# Patient Record
Sex: FEMALE | Race: WHITE | NOT HISPANIC OR LATINO
[De-identification: names, ages, dates, MRNs, and addresses within clinical notes are randomized per-mention and may not be internally consistent; named-entity substitution may affect disease eponyms.]

---

## 2017-04-06 ENCOUNTER — MEDICATION RENEWAL (OUTPATIENT)
Age: 37
End: 2017-04-06

## 2017-05-04 ENCOUNTER — MEDICATION RENEWAL (OUTPATIENT)
Age: 37
End: 2017-05-04

## 2017-12-22 ENCOUNTER — OUTPATIENT (OUTPATIENT)
Dept: OUTPATIENT SERVICES | Facility: HOSPITAL | Age: 37
LOS: 1 days | End: 2017-12-22
Payer: COMMERCIAL

## 2017-12-22 DIAGNOSIS — R00.2 PALPITATIONS: ICD-10-CM

## 2017-12-22 PROCEDURE — 93306 TTE W/DOPPLER COMPLETE: CPT | Mod: 26

## 2017-12-22 PROCEDURE — 93306 TTE W/DOPPLER COMPLETE: CPT

## 2018-01-05 ENCOUNTER — APPOINTMENT (OUTPATIENT)
Dept: OBGYN | Facility: CLINIC | Age: 38
End: 2018-01-05

## 2018-01-09 ENCOUNTER — RX RENEWAL (OUTPATIENT)
Age: 38
End: 2018-01-09

## 2018-01-19 ENCOUNTER — APPOINTMENT (OUTPATIENT)
Dept: OBGYN | Facility: CLINIC | Age: 38
End: 2018-01-19

## 2018-01-26 ENCOUNTER — NON-APPOINTMENT (OUTPATIENT)
Age: 38
End: 2018-01-26

## 2018-01-26 ENCOUNTER — APPOINTMENT (OUTPATIENT)
Dept: CARDIOLOGY | Facility: CLINIC | Age: 38
End: 2018-01-26
Payer: COMMERCIAL

## 2018-01-26 VITALS
OXYGEN SATURATION: 100 % | HEART RATE: 73 BPM | WEIGHT: 155 LBS | DIASTOLIC BLOOD PRESSURE: 68 MMHG | SYSTOLIC BLOOD PRESSURE: 101 MMHG | BODY MASS INDEX: 24.33 KG/M2 | HEIGHT: 67 IN

## 2018-01-26 PROCEDURE — 99215 OFFICE O/P EST HI 40 MIN: CPT

## 2018-01-26 PROCEDURE — 93000 ELECTROCARDIOGRAM COMPLETE: CPT

## 2018-02-03 PROCEDURE — 93272 ECG/REVIEW INTERPRET ONLY: CPT

## 2018-02-09 ENCOUNTER — APPOINTMENT (OUTPATIENT)
Dept: CARDIOTHORACIC SURGERY | Facility: CLINIC | Age: 38
End: 2018-02-09
Payer: COMMERCIAL

## 2018-02-09 VITALS
TEMPERATURE: 98 F | RESPIRATION RATE: 13 BRPM | BODY MASS INDEX: 24.33 KG/M2 | OXYGEN SATURATION: 100 % | HEART RATE: 86 BPM | SYSTOLIC BLOOD PRESSURE: 109 MMHG | WEIGHT: 155 LBS | HEIGHT: 67 IN | DIASTOLIC BLOOD PRESSURE: 68 MMHG

## 2018-02-09 PROCEDURE — 99245 OFF/OP CONSLTJ NEW/EST HI 55: CPT

## 2018-02-19 ENCOUNTER — FORM ENCOUNTER (OUTPATIENT)
Age: 38
End: 2018-02-19

## 2018-03-16 ENCOUNTER — OUTPATIENT (OUTPATIENT)
Dept: OUTPATIENT SERVICES | Facility: HOSPITAL | Age: 38
LOS: 1 days | End: 2018-03-16
Payer: COMMERCIAL

## 2018-03-16 ENCOUNTER — APPOINTMENT (OUTPATIENT)
Dept: CT IMAGING | Facility: IMAGING CENTER | Age: 38
End: 2018-03-16
Payer: COMMERCIAL

## 2018-03-16 DIAGNOSIS — Z79.01 LONG TERM (CURRENT) USE OF ANTICOAGULANTS: ICD-10-CM

## 2018-03-16 PROCEDURE — 71270 CT THORAX DX C-/C+: CPT

## 2018-03-16 PROCEDURE — 71270 CT THORAX DX C-/C+: CPT | Mod: 26

## 2018-03-18 ENCOUNTER — TRANSCRIPTION ENCOUNTER (OUTPATIENT)
Age: 38
End: 2018-03-18

## 2018-05-15 ENCOUNTER — NON-APPOINTMENT (OUTPATIENT)
Age: 38
End: 2018-05-15

## 2018-05-15 ENCOUNTER — APPOINTMENT (OUTPATIENT)
Dept: CARDIOLOGY | Facility: CLINIC | Age: 38
End: 2018-05-15
Payer: COMMERCIAL

## 2018-05-15 VITALS
HEIGHT: 67 IN | DIASTOLIC BLOOD PRESSURE: 68 MMHG | HEART RATE: 88 BPM | SYSTOLIC BLOOD PRESSURE: 112 MMHG | WEIGHT: 150 LBS | OXYGEN SATURATION: 100 % | BODY MASS INDEX: 23.54 KG/M2

## 2018-05-15 DIAGNOSIS — R53.83 OTHER FATIGUE: ICD-10-CM

## 2018-05-15 LAB
BASOPHILS # BLD AUTO: 0.04 K/UL
BASOPHILS NFR BLD AUTO: 0.6 %
EOSINOPHIL # BLD AUTO: 0.78 K/UL
EOSINOPHIL NFR BLD AUTO: 10.9 %
HCT VFR BLD CALC: 35.7 %
HGB BLD-MCNC: 10.5 G/DL
IMM GRANULOCYTES NFR BLD AUTO: 0.3 %
INR PPP: 3.61 RATIO
LYMPHOCYTES # BLD AUTO: 1.32 K/UL
LYMPHOCYTES NFR BLD AUTO: 18.5 %
MAN DIFF?: NORMAL
MCHC RBC-ENTMCNC: 25.7 PG
MCHC RBC-ENTMCNC: 29.4 GM/DL
MCV RBC AUTO: 87.5 FL
MONOCYTES # BLD AUTO: 0.54 K/UL
MONOCYTES NFR BLD AUTO: 7.6 %
NEUTROPHILS # BLD AUTO: 4.45 K/UL
NEUTROPHILS NFR BLD AUTO: 62.1 %
PLATELET # BLD AUTO: 267 K/UL
PT BLD: 41.9 SEC
RBC # BLD: 4.08 M/UL
RBC # FLD: 16.6 %
WBC # FLD AUTO: 7.15 K/UL

## 2018-05-15 PROCEDURE — 93000 ELECTROCARDIOGRAM COMPLETE: CPT

## 2018-05-15 PROCEDURE — 99215 OFFICE O/P EST HI 40 MIN: CPT

## 2018-05-16 LAB
ANION GAP SERPL CALC-SCNC: 13 MMOL/L
BUN SERPL-MCNC: 8 MG/DL
CALCIUM SERPL-MCNC: 9.9 MG/DL
CHLORIDE SERPL-SCNC: 100 MMOL/L
CO2 SERPL-SCNC: 25 MMOL/L
CREAT SERPL-MCNC: 0.55 MG/DL
GLUCOSE SERPL-MCNC: 75 MG/DL
NT-PROBNP SERPL-MCNC: 186 PG/ML
POTASSIUM SERPL-SCNC: 4.6 MMOL/L
SODIUM SERPL-SCNC: 138 MMOL/L

## 2018-08-17 ENCOUNTER — APPOINTMENT (OUTPATIENT)
Dept: OBGYN | Facility: CLINIC | Age: 38
End: 2018-08-17

## 2018-08-23 ENCOUNTER — NON-APPOINTMENT (OUTPATIENT)
Age: 38
End: 2018-08-23

## 2018-08-23 ENCOUNTER — APPOINTMENT (OUTPATIENT)
Dept: CARDIOLOGY | Facility: CLINIC | Age: 38
End: 2018-08-23
Payer: COMMERCIAL

## 2018-08-23 VITALS
BODY MASS INDEX: 23.65 KG/M2 | DIASTOLIC BLOOD PRESSURE: 66 MMHG | SYSTOLIC BLOOD PRESSURE: 101 MMHG | HEART RATE: 85 BPM | OXYGEN SATURATION: 100 % | WEIGHT: 151 LBS

## 2018-08-23 PROCEDURE — 99215 OFFICE O/P EST HI 40 MIN: CPT

## 2018-08-23 RX ORDER — POTASSIUM CITRATE 10 MEQ/1
10 MEQ TABLET, EXTENDED RELEASE ORAL
Qty: 15 | Refills: 0 | Status: DISCONTINUED | COMMUNITY
Start: 2018-04-24 | End: 2018-08-23

## 2018-08-23 RX ORDER — AMIODARONE HYDROCHLORIDE 200 MG/1
200 TABLET ORAL
Qty: 30 | Refills: 0 | Status: DISCONTINUED | COMMUNITY
Start: 2018-04-24 | End: 2018-08-23

## 2018-08-23 RX ORDER — METOPROLOL TARTRATE 25 MG/1
25 TABLET, FILM COATED ORAL TWICE DAILY
Qty: 180 | Refills: 1 | Status: DISCONTINUED | COMMUNITY
Start: 2018-04-24 | End: 2018-08-23

## 2018-08-23 RX ORDER — FOLIC ACID 1 MG/1
1 TABLET ORAL
Qty: 30 | Refills: 0 | Status: DISCONTINUED | COMMUNITY
Start: 2018-04-24 | End: 2018-08-23

## 2018-08-23 RX ORDER — FUROSEMIDE 20 MG/1
20 TABLET ORAL
Qty: 15 | Refills: 0 | Status: DISCONTINUED | COMMUNITY
Start: 2018-04-24 | End: 2018-08-23

## 2018-10-12 ENCOUNTER — APPOINTMENT (OUTPATIENT)
Dept: OBGYN | Facility: CLINIC | Age: 38
End: 2018-10-12
Payer: COMMERCIAL

## 2018-10-12 ENCOUNTER — RESULT REVIEW (OUTPATIENT)
Age: 38
End: 2018-10-12

## 2018-10-12 PROCEDURE — 99385 PREV VISIT NEW AGE 18-39: CPT

## 2018-10-15 ENCOUNTER — MEDICATION RENEWAL (OUTPATIENT)
Age: 38
End: 2018-10-15

## 2018-10-19 ENCOUNTER — OUTPATIENT (OUTPATIENT)
Dept: OUTPATIENT SERVICES | Facility: HOSPITAL | Age: 38
LOS: 1 days | End: 2018-10-19
Payer: COMMERCIAL

## 2018-10-19 ENCOUNTER — APPOINTMENT (OUTPATIENT)
Dept: ULTRASOUND IMAGING | Facility: CLINIC | Age: 38
End: 2018-10-19
Payer: COMMERCIAL

## 2018-10-19 DIAGNOSIS — Z00.8 ENCOUNTER FOR OTHER GENERAL EXAMINATION: ICD-10-CM

## 2018-10-19 PROCEDURE — 76856 US EXAM PELVIC COMPLETE: CPT | Mod: 26

## 2018-10-19 PROCEDURE — 76830 TRANSVAGINAL US NON-OB: CPT | Mod: 26

## 2018-10-19 PROCEDURE — 76856 US EXAM PELVIC COMPLETE: CPT

## 2018-10-19 PROCEDURE — 76830 TRANSVAGINAL US NON-OB: CPT

## 2018-11-02 ENCOUNTER — APPOINTMENT (OUTPATIENT)
Dept: CV DIAGNOSITCS | Facility: HOSPITAL | Age: 38
End: 2018-11-02

## 2018-11-02 ENCOUNTER — OUTPATIENT (OUTPATIENT)
Dept: OUTPATIENT SERVICES | Facility: HOSPITAL | Age: 38
LOS: 1 days | End: 2018-11-02
Payer: COMMERCIAL

## 2018-11-02 DIAGNOSIS — I35.1 NONRHEUMATIC AORTIC (VALVE) INSUFFICIENCY: ICD-10-CM

## 2018-11-02 PROCEDURE — 93306 TTE W/DOPPLER COMPLETE: CPT

## 2018-11-02 PROCEDURE — 93306 TTE W/DOPPLER COMPLETE: CPT | Mod: 26

## 2018-12-28 ENCOUNTER — OUTPATIENT (OUTPATIENT)
Dept: OUTPATIENT SERVICES | Facility: HOSPITAL | Age: 38
LOS: 1 days | End: 2018-12-28
Payer: COMMERCIAL

## 2018-12-28 ENCOUNTER — APPOINTMENT (OUTPATIENT)
Dept: ULTRASOUND IMAGING | Facility: CLINIC | Age: 38
End: 2018-12-28
Payer: COMMERCIAL

## 2018-12-28 DIAGNOSIS — Z00.8 ENCOUNTER FOR OTHER GENERAL EXAMINATION: ICD-10-CM

## 2018-12-28 PROCEDURE — 76830 TRANSVAGINAL US NON-OB: CPT

## 2018-12-28 PROCEDURE — 76830 TRANSVAGINAL US NON-OB: CPT | Mod: 26

## 2018-12-28 PROCEDURE — 76856 US EXAM PELVIC COMPLETE: CPT

## 2018-12-28 PROCEDURE — 76856 US EXAM PELVIC COMPLETE: CPT | Mod: 26

## 2019-02-21 ENCOUNTER — APPOINTMENT (OUTPATIENT)
Dept: CARDIOLOGY | Facility: CLINIC | Age: 39
End: 2019-02-21
Payer: COMMERCIAL

## 2019-02-21 ENCOUNTER — NON-APPOINTMENT (OUTPATIENT)
Age: 39
End: 2019-02-21

## 2019-02-21 VITALS
OXYGEN SATURATION: 99 % | DIASTOLIC BLOOD PRESSURE: 67 MMHG | SYSTOLIC BLOOD PRESSURE: 109 MMHG | BODY MASS INDEX: 23.54 KG/M2 | HEIGHT: 67 IN | HEART RATE: 81 BPM | WEIGHT: 150 LBS

## 2019-02-21 PROCEDURE — 99215 OFFICE O/P EST HI 40 MIN: CPT

## 2019-02-21 PROCEDURE — 93000 ELECTROCARDIOGRAM COMPLETE: CPT

## 2019-02-21 NOTE — HISTORY OF PRESENT ILLNESS
[FreeTextEntry1] : Zoe is feeling well. She can climb the subway stairs quickly like never before. She wants to go on a diet that has a lot of Vit K because of vegetables. Also thinking of pregnancy again.

## 2019-02-21 NOTE — PHYSICAL EXAM
[General Appearance - Well Developed] : well developed [Normal Appearance] : normal appearance [Well Groomed] : well groomed [General Appearance - Well Nourished] : well nourished [No Deformities] : no deformities [General Appearance - In No Acute Distress] : no acute distress [Normal Conjunctiva] : the conjunctiva exhibited no abnormalities [Eyelids - No Xanthelasma] : the eyelids demonstrated no xanthelasmas [Normal Oral Mucosa] : normal oral mucosa [No Oral Pallor] : no oral pallor [No Oral Cyanosis] : no oral cyanosis [Normal Jugular Venous A Waves Present] : normal jugular venous A waves present [Normal Jugular Venous V Waves Present] : normal jugular venous V waves present [No Jugular Venous Frias A Waves] : no jugular venous frias A waves [Respiration, Rhythm And Depth] : normal respiratory rhythm and effort [Exaggerated Use Of Accessory Muscles For Inspiration] : no accessory muscle use [Auscultation Breath Sounds / Voice Sounds] : lungs were clear to auscultation bilaterally [Heart Rate And Rhythm] : heart rate and rhythm were normal [Murmurs] : no murmurs present [Abdomen Soft] : soft [Abdomen Tenderness] : non-tender [Abdomen Mass (___ Cm)] : no abdominal mass palpated [Abnormal Walk] : normal gait [Gait - Sufficient For Exercise Testing] : the gait was sufficient for exercise testing [Nail Clubbing] : no clubbing of the fingernails [Cyanosis, Localized] : no localized cyanosis [Petechial Hemorrhages (___cm)] : no petechial hemorrhages [Skin Color & Pigmentation] : normal skin color and pigmentation [] : no rash [No Venous Stasis] : no venous stasis [Skin Lesions] : no skin lesions [No Skin Ulcers] : no skin ulcer [No Xanthoma] : no  xanthoma was observed [Oriented To Time, Place, And Person] : oriented to person, place, and time [Affect] : the affect was normal [Mood] : the mood was normal [No Anxiety] : not feeling anxious [FreeTextEntry1] : Prosthetic S1 and S2

## 2019-02-21 NOTE — DISCUSSION/SUMMARY
[___ Month(s)] : [unfilled] month(s) [FreeTextEntry1] : The patient is a 38-year-old female history of rheumatic heart disease, mechanical mitral and aortic valve replacement s/p reop who is doing well. Crisp prosthetic heart sounds on exam. No evidence arrhythmia off medication. She understands she is at risk for recurrent afib and may take prn metoprolol should that occur and notify me at the time. Continue [facility] home monitoring weekly for INR. Once starts the diet should check INR more often.  She has been therapeutic with no bleeding episodes. 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 High risk for pregnancy. ECHO at one year in April.

## 2019-05-31 ENCOUNTER — APPOINTMENT (OUTPATIENT)
Dept: ULTRASOUND IMAGING | Facility: CLINIC | Age: 39
End: 2019-05-31
Payer: COMMERCIAL

## 2019-05-31 ENCOUNTER — OUTPATIENT (OUTPATIENT)
Dept: OUTPATIENT SERVICES | Facility: HOSPITAL | Age: 39
LOS: 1 days | End: 2019-05-31
Payer: COMMERCIAL

## 2019-05-31 DIAGNOSIS — Z00.8 ENCOUNTER FOR OTHER GENERAL EXAMINATION: ICD-10-CM

## 2019-05-31 PROCEDURE — 76830 TRANSVAGINAL US NON-OB: CPT | Mod: 26

## 2019-05-31 PROCEDURE — 76856 US EXAM PELVIC COMPLETE: CPT | Mod: 26

## 2019-05-31 PROCEDURE — 76856 US EXAM PELVIC COMPLETE: CPT

## 2019-05-31 PROCEDURE — 76830 TRANSVAGINAL US NON-OB: CPT

## 2019-08-16 ENCOUNTER — OUTPATIENT (OUTPATIENT)
Dept: OUTPATIENT SERVICES | Facility: HOSPITAL | Age: 39
LOS: 1 days | End: 2019-08-16
Payer: COMMERCIAL

## 2019-08-16 ENCOUNTER — APPOINTMENT (OUTPATIENT)
Dept: CV DIAGNOSITCS | Facility: HOSPITAL | Age: 39
End: 2019-08-16

## 2019-08-16 DIAGNOSIS — R00.2 PALPITATIONS: ICD-10-CM

## 2019-08-16 PROCEDURE — 93306 TTE W/DOPPLER COMPLETE: CPT | Mod: 26

## 2019-08-16 PROCEDURE — 93306 TTE W/DOPPLER COMPLETE: CPT

## 2019-08-21 ENCOUNTER — TRANSCRIPTION ENCOUNTER (OUTPATIENT)
Age: 39
End: 2019-08-21

## 2019-08-23 ENCOUNTER — APPOINTMENT (OUTPATIENT)
Dept: CARDIOLOGY | Facility: CLINIC | Age: 39
End: 2019-08-23

## 2019-08-23 ENCOUNTER — APPOINTMENT (OUTPATIENT)
Dept: CV DIAGNOSITCS | Facility: HOSPITAL | Age: 39
End: 2019-08-23

## 2019-10-03 ENCOUNTER — RX RENEWAL (OUTPATIENT)
Age: 39
End: 2019-10-03

## 2019-12-26 ENCOUNTER — RESULT REVIEW (OUTPATIENT)
Age: 39
End: 2019-12-26

## 2019-12-26 ENCOUNTER — APPOINTMENT (OUTPATIENT)
Dept: CARDIOLOGY | Facility: CLINIC | Age: 39
End: 2019-12-26
Payer: COMMERCIAL

## 2019-12-26 ENCOUNTER — NON-APPOINTMENT (OUTPATIENT)
Age: 39
End: 2019-12-26

## 2019-12-26 VITALS
OXYGEN SATURATION: 100 % | HEART RATE: 73 BPM | RESPIRATION RATE: 12 BRPM | WEIGHT: 152 LBS | HEIGHT: 67 IN | SYSTOLIC BLOOD PRESSURE: 116 MMHG | BODY MASS INDEX: 23.86 KG/M2 | DIASTOLIC BLOOD PRESSURE: 68 MMHG

## 2019-12-26 PROCEDURE — 99214 OFFICE O/P EST MOD 30 MIN: CPT | Mod: 25

## 2019-12-26 PROCEDURE — 93000 ELECTROCARDIOGRAM COMPLETE: CPT

## 2019-12-26 NOTE — REVIEW OF SYSTEMS
[Shortness Of Breath] : no shortness of breath [Dyspnea on exertion] : not dyspnea during exertion [Chest Pain] : no chest pain [Lower Ext Edema] : no extremity edema [Palpitations] : no palpitations [Negative] : Endocrine

## 2019-12-26 NOTE — DISCUSSION/SUMMARY
[___ Month(s)] : [unfilled] month(s) [FreeTextEntry1] : The patient is a 39-year-old female history of rheumatic heart disease, mechanical mitral and aortic valve replacement s/p reop who is doing well. Crisp prosthetic heart sounds on exam. No evidence arrhythmia off medication. She understands she is at risk for recurrent afib and may take prn metoprolol should that occur and notify me at the time. Continue [facility] home monitoring weekly for INR. She has been therapeutic with no bleeding episodes. 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 ECHO 8/19 normal prosthetic valves

## 2019-12-26 NOTE — HISTORY OF PRESENT ILLNESS
[FreeTextEntry1] : Zoe is feeling well. She has been well with no further palpitations. Working full-time.

## 2019-12-26 NOTE — PHYSICAL EXAM
[Well Groomed] : well groomed [Normal Appearance] : normal appearance [General Appearance - Well Developed] : well developed [General Appearance - Well Nourished] : well nourished [General Appearance - In No Acute Distress] : no acute distress [No Deformities] : no deformities [Normal Oral Mucosa] : normal oral mucosa [Eyelids - No Xanthelasma] : the eyelids demonstrated no xanthelasmas [Normal Conjunctiva] : the conjunctiva exhibited no abnormalities [No Oral Cyanosis] : no oral cyanosis [No Oral Pallor] : no oral pallor [Normal Jugular Venous A Waves Present] : normal jugular venous A waves present [Normal Jugular Venous V Waves Present] : normal jugular venous V waves present [No Jugular Venous Frias A Waves] : no jugular venous frias A waves [Exaggerated Use Of Accessory Muscles For Inspiration] : no accessory muscle use [Respiration, Rhythm And Depth] : normal respiratory rhythm and effort [Auscultation Breath Sounds / Voice Sounds] : lungs were clear to auscultation bilaterally [Murmurs] : no murmurs present [Heart Rate And Rhythm] : heart rate and rhythm were normal [Abdomen Soft] : soft [Abdomen Tenderness] : non-tender [FreeTextEntry1] : Prosthetic S1 and S2 [Abnormal Walk] : normal gait [Abdomen Mass (___ Cm)] : no abdominal mass palpated [Gait - Sufficient For Exercise Testing] : the gait was sufficient for exercise testing [Nail Clubbing] : no clubbing of the fingernails [Cyanosis, Localized] : no localized cyanosis [Petechial Hemorrhages (___cm)] : no petechial hemorrhages [Skin Color & Pigmentation] : normal skin color and pigmentation [] : no rash [No Skin Ulcers] : no skin ulcer [Skin Lesions] : no skin lesions [No Venous Stasis] : no venous stasis [Oriented To Time, Place, And Person] : oriented to person, place, and time [No Xanthoma] : no  xanthoma was observed [Mood] : the mood was normal [Affect] : the affect was normal [No Anxiety] : not feeling anxious

## 2020-03-16 ENCOUNTER — RX RENEWAL (OUTPATIENT)
Age: 40
End: 2020-03-16

## 2020-04-23 ENCOUNTER — NON-APPOINTMENT (OUTPATIENT)
Age: 40
End: 2020-04-23

## 2020-04-23 ENCOUNTER — APPOINTMENT (OUTPATIENT)
Dept: CARDIOLOGY | Facility: CLINIC | Age: 40
End: 2020-04-23
Payer: COMMERCIAL

## 2020-04-23 ENCOUNTER — LABORATORY RESULT (OUTPATIENT)
Age: 40
End: 2020-04-23

## 2020-04-23 VITALS
BODY MASS INDEX: 24.17 KG/M2 | WEIGHT: 154 LBS | OXYGEN SATURATION: 100 % | TEMPERATURE: 97.8 F | HEART RATE: 82 BPM | DIASTOLIC BLOOD PRESSURE: 80 MMHG | SYSTOLIC BLOOD PRESSURE: 133 MMHG | HEIGHT: 67 IN

## 2020-04-23 DIAGNOSIS — R07.89 OTHER CHEST PAIN: ICD-10-CM

## 2020-04-23 DIAGNOSIS — R42 DIZZINESS AND GIDDINESS: ICD-10-CM

## 2020-04-23 LAB
ALBUMIN SERPL ELPH-MCNC: 4.8 G/DL
ALP BLD-CCNC: 100 U/L
ALT SERPL-CCNC: 8 U/L
ANION GAP SERPL CALC-SCNC: 12 MMOL/L
AST SERPL-CCNC: 22 U/L
BASOPHILS # BLD AUTO: 0.05 K/UL
BASOPHILS NFR BLD AUTO: 0.6 %
BILIRUB SERPL-MCNC: 0.7 MG/DL
BUN SERPL-MCNC: 8 MG/DL
CALCIUM SERPL-MCNC: 9.4 MG/DL
CHLORIDE SERPL-SCNC: 101 MMOL/L
CO2 SERPL-SCNC: 26 MMOL/L
CREAT SERPL-MCNC: 0.5 MG/DL
CRP SERPL HS-MCNC: 13.47 MG/L
EOSINOPHIL # BLD AUTO: 0.87 K/UL
EOSINOPHIL NFR BLD AUTO: 11 %
GLUCOSE SERPL-MCNC: 97 MG/DL
HCT VFR BLD CALC: 38.8 %
HGB BLD-MCNC: 11.8 G/DL
IMM GRANULOCYTES NFR BLD AUTO: 0.3 %
LYMPHOCYTES # BLD AUTO: 0.9 K/UL
LYMPHOCYTES NFR BLD AUTO: 11.4 %
MAN DIFF?: NORMAL
MCHC RBC-ENTMCNC: 25.2 PG
MCHC RBC-ENTMCNC: 30.4 GM/DL
MCV RBC AUTO: 82.7 FL
MONOCYTES # BLD AUTO: 0.45 K/UL
MONOCYTES NFR BLD AUTO: 5.7 %
NEUTROPHILS # BLD AUTO: 5.61 K/UL
NEUTROPHILS NFR BLD AUTO: 71 %
PLATELET # BLD AUTO: 250 K/UL
POTASSIUM SERPL-SCNC: 4.5 MMOL/L
PROT SERPL-MCNC: 7.7 G/DL
RBC # BLD: 4.69 M/UL
RBC # FLD: 17.4 %
SODIUM SERPL-SCNC: 139 MMOL/L
TROPONIN I SERPL-MCNC: 0.03 NG/ML
WBC # FLD AUTO: 7.9 K/UL

## 2020-04-23 PROCEDURE — 99214 OFFICE O/P EST MOD 30 MIN: CPT

## 2020-04-23 PROCEDURE — 93000 ELECTROCARDIOGRAM COMPLETE: CPT

## 2020-04-23 NOTE — DISCUSSION/SUMMARY
[___ Month(s)] : [unfilled] month(s) [FreeTextEntry1] : The patient is a 39-year-old female history of rheumatic heart disease, mechanical mitral and aortic valve replacement s/p reop with atypical chest pain.\par #1 CV- less likely ischemic in origin, reassurance, echo today, troponin and CRP ordered\par #2 RHD- ECHO today, INR stable with [facility] home monitoring\par #3 General- emotional support provided.

## 2020-04-23 NOTE — REVIEW OF SYSTEMS
[Negative] : Heme/Lymph [Dyspnea on exertion] : not dyspnea during exertion [Shortness Of Breath] : no shortness of breath [Chest Pain] : chest pain [Lower Ext Edema] : no extremity edema [Palpitations] : no palpitations

## 2020-04-23 NOTE — REASON FOR VISIT
[Chest Pain] : chest pain [Acute Exacerbation] : an acute exacerbation of [Prosthetic Valve] : a prosthetic valve

## 2020-04-23 NOTE — HISTORY OF PRESENT ILLNESS
[FreeTextEntry1] : Zoe presents urgently today with chest pain. She feels like a lemon squeezing in left chest that she woke up with. She says it started mild in December after our last visit but recently has intensified and happens more regularly and lasts longer. She is very emotional today. She does not think she is stressed about COVID. 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 Symptoms occur at rest, when she wakes up or any time during the day.

## 2020-04-23 NOTE — PHYSICAL EXAM
[Well Groomed] : well groomed [Normal Appearance] : normal appearance [General Appearance - Well Developed] : well developed [No Deformities] : no deformities [General Appearance - In No Acute Distress] : no acute distress [General Appearance - Well Nourished] : well nourished [Normal Conjunctiva] : the conjunctiva exhibited no abnormalities [Eyelids - No Xanthelasma] : the eyelids demonstrated no xanthelasmas [Normal Oral Mucosa] : normal oral mucosa [No Oral Cyanosis] : no oral cyanosis [No Oral Pallor] : no oral pallor [Normal Jugular Venous A Waves Present] : normal jugular venous A waves present [No Jugular Venous Frias A Waves] : no jugular venous frias A waves [Normal Jugular Venous V Waves Present] : normal jugular venous V waves present [Respiration, Rhythm And Depth] : normal respiratory rhythm and effort [Exaggerated Use Of Accessory Muscles For Inspiration] : no accessory muscle use [Heart Rate And Rhythm] : heart rate and rhythm were normal [Murmurs] : no murmurs present [Auscultation Breath Sounds / Voice Sounds] : lungs were clear to auscultation bilaterally [Abdomen Soft] : soft [Abdomen Tenderness] : non-tender [Abdomen Mass (___ Cm)] : no abdominal mass palpated [Abnormal Walk] : normal gait [Gait - Sufficient For Exercise Testing] : the gait was sufficient for exercise testing [Cyanosis, Localized] : no localized cyanosis [Nail Clubbing] : no clubbing of the fingernails [Skin Color & Pigmentation] : normal skin color and pigmentation [Petechial Hemorrhages (___cm)] : no petechial hemorrhages [Skin Lesions] : no skin lesions [No Venous Stasis] : no venous stasis [] : no rash [Oriented To Time, Place, And Person] : oriented to person, place, and time [No Xanthoma] : no  xanthoma was observed [No Skin Ulcers] : no skin ulcer [Mood] : the mood was normal [Affect] : the affect was normal [No Anxiety] : not feeling anxious [FreeTextEntry1] : Prosthetic S1 and S2

## 2020-04-24 LAB — 24R-OH-CALCIDIOL SERPL-MCNC: 49.9 PG/ML

## 2020-04-27 ENCOUNTER — APPOINTMENT (OUTPATIENT)
Dept: CARDIOLOGY | Facility: CLINIC | Age: 40
End: 2020-04-27
Payer: COMMERCIAL

## 2020-04-27 PROCEDURE — 93306 TTE W/DOPPLER COMPLETE: CPT

## 2020-10-07 ENCOUNTER — RX RENEWAL (OUTPATIENT)
Age: 40
End: 2020-10-07

## 2021-01-28 ENCOUNTER — RESULT REVIEW (OUTPATIENT)
Age: 41
End: 2021-01-28

## 2021-01-28 ENCOUNTER — NON-APPOINTMENT (OUTPATIENT)
Age: 41
End: 2021-01-28

## 2021-01-28 ENCOUNTER — APPOINTMENT (OUTPATIENT)
Dept: CARDIOLOGY | Facility: CLINIC | Age: 41
End: 2021-01-28
Payer: COMMERCIAL

## 2021-01-28 VITALS
HEIGHT: 67 IN | WEIGHT: 150 LBS | DIASTOLIC BLOOD PRESSURE: 74 MMHG | OXYGEN SATURATION: 98 % | HEART RATE: 85 BPM | BODY MASS INDEX: 23.54 KG/M2 | SYSTOLIC BLOOD PRESSURE: 124 MMHG

## 2021-01-28 PROCEDURE — 99072 ADDL SUPL MATRL&STAF TM PHE: CPT

## 2021-01-28 PROCEDURE — 99214 OFFICE O/P EST MOD 30 MIN: CPT

## 2021-01-28 NOTE — HISTORY OF PRESENT ILLNESS
[FreeTextEntry1] : Zoe presents today with shortness of breath similar to prior to valve surgery. She has noticed over the last few weeks that she cannot walk as far without stopping because she gets out of breath. She cannot carry the groceries as she had been because she gets very short of breath as soon as she lifts two bags.She tries to lift one but if it is heavy she cannot. These symptoms are similar to pre valve reop except without the chest pain. No lightheadedness, dizziness or palpitations.

## 2021-01-28 NOTE — REASON FOR VISIT
[Acute Exacerbation] : an acute exacerbation of [Dyspnea] : dyspnea [Prosthetic Valve] : a prosthetic valve

## 2021-01-28 NOTE — DISCUSSION/SUMMARY
[___ Month(s)] : [unfilled] month(s) [FreeTextEntry1] : The patient is a 40-year-old female history of rheumatic heart disease, mechanical mitral and aortic valve replacement s/p reop with new onset dyspnea.\par #1 CV- ECG unchanged, echo today\par #2 RHD- ECHO today, INR stable with [facility] home monitoring\par #3 General- emotional support provided.

## 2021-01-28 NOTE — PHYSICAL EXAM
[General Appearance - Well Developed] : well developed [Normal Appearance] : normal appearance [Well Groomed] : well groomed [General Appearance - Well Nourished] : well nourished [No Deformities] : no deformities [General Appearance - In No Acute Distress] : no acute distress [Normal Conjunctiva] : the conjunctiva exhibited no abnormalities [Eyelids - No Xanthelasma] : the eyelids demonstrated no xanthelasmas [Normal Oral Mucosa] : normal oral mucosa [No Oral Pallor] : no oral pallor [No Oral Cyanosis] : no oral cyanosis [Normal Jugular Venous A Waves Present] : normal jugular venous A waves present [Normal Jugular Venous V Waves Present] : normal jugular venous V waves present [No Jugular Venous Frias A Waves] : no jugular venous frias A waves [Respiration, Rhythm And Depth] : normal respiratory rhythm and effort [Exaggerated Use Of Accessory Muscles For Inspiration] : no accessory muscle use [Auscultation Breath Sounds / Voice Sounds] : lungs were clear to auscultation bilaterally [Heart Rate And Rhythm] : heart rate and rhythm were normal [Murmurs] : no murmurs present [FreeTextEntry1] : Prosthetic S1 and S2 [Abdomen Soft] : soft [Abdomen Tenderness] : non-tender [Abdomen Mass (___ Cm)] : no abdominal mass palpated [Abnormal Walk] : normal gait [Gait - Sufficient For Exercise Testing] : the gait was sufficient for exercise testing [Nail Clubbing] : no clubbing of the fingernails [Petechial Hemorrhages (___cm)] : no petechial hemorrhages [Cyanosis, Localized] : no localized cyanosis [Skin Color & Pigmentation] : normal skin color and pigmentation [] : no rash [No Venous Stasis] : no venous stasis [Skin Lesions] : no skin lesions [No Skin Ulcers] : no skin ulcer [No Xanthoma] : no  xanthoma was observed [Oriented To Time, Place, And Person] : oriented to person, place, and time [Affect] : the affect was normal [Mood] : the mood was normal [No Anxiety] : not feeling anxious

## 2021-02-12 ENCOUNTER — APPOINTMENT (OUTPATIENT)
Dept: CV DIAGNOSITCS | Facility: HOSPITAL | Age: 41
End: 2021-02-12

## 2021-02-12 ENCOUNTER — OUTPATIENT (OUTPATIENT)
Dept: OUTPATIENT SERVICES | Facility: HOSPITAL | Age: 41
LOS: 1 days | End: 2021-02-12
Payer: COMMERCIAL

## 2021-02-12 DIAGNOSIS — R06.00 DYSPNEA, UNSPECIFIED: ICD-10-CM

## 2021-02-12 PROCEDURE — 93306 TTE W/DOPPLER COMPLETE: CPT

## 2021-02-12 PROCEDURE — 93306 TTE W/DOPPLER COMPLETE: CPT | Mod: 26

## 2021-04-28 ENCOUNTER — RX RENEWAL (OUTPATIENT)
Age: 41
End: 2021-04-28

## 2021-04-30 ENCOUNTER — APPOINTMENT (OUTPATIENT)
Dept: MAMMOGRAPHY | Facility: CLINIC | Age: 41
End: 2021-04-30
Payer: COMMERCIAL

## 2021-04-30 ENCOUNTER — APPOINTMENT (OUTPATIENT)
Dept: ULTRASOUND IMAGING | Facility: CLINIC | Age: 41
End: 2021-04-30
Payer: COMMERCIAL

## 2021-04-30 PROCEDURE — 77067 SCR MAMMO BI INCL CAD: CPT

## 2021-04-30 PROCEDURE — 76641 ULTRASOUND BREAST COMPLETE: CPT | Mod: 50

## 2021-04-30 PROCEDURE — 77063 BREAST TOMOSYNTHESIS BI: CPT

## 2021-05-31 ENCOUNTER — RX RENEWAL (OUTPATIENT)
Age: 41
End: 2021-05-31

## 2021-08-03 ENCOUNTER — NON-APPOINTMENT (OUTPATIENT)
Age: 41
End: 2021-08-03

## 2021-08-03 ENCOUNTER — APPOINTMENT (OUTPATIENT)
Dept: CARDIOLOGY | Facility: CLINIC | Age: 41
End: 2021-08-03
Payer: COMMERCIAL

## 2021-08-03 VITALS
HEIGHT: 67 IN | SYSTOLIC BLOOD PRESSURE: 108 MMHG | WEIGHT: 155 LBS | OXYGEN SATURATION: 99 % | DIASTOLIC BLOOD PRESSURE: 71 MMHG | HEART RATE: 70 BPM | BODY MASS INDEX: 24.33 KG/M2

## 2021-08-03 PROCEDURE — 93000 ELECTROCARDIOGRAM COMPLETE: CPT

## 2021-08-03 PROCEDURE — 99214 OFFICE O/P EST MOD 30 MIN: CPT

## 2021-08-04 NOTE — REVIEW OF SYSTEMS
[SOB] : shortness of breath [Dyspnea on exertion] : dyspnea during exertion [Chest Discomfort] : no chest discomfort [Lower Ext Edema] : no extremity edema [Leg Claudication] : no intermittent leg claudication [Palpitations] : no palpitations [Syncope] : no syncope [Negative] : Heme/Lymph

## 2021-08-04 NOTE — DISCUSSION/SUMMARY
[FreeTextEntry1] : The patient is a 41-year-old female history of rheumatic heart disease, mechanical mitral and aortic valve replacement s/p reop with new onset dyspnea.\par #1 CV- ECG unchanged, echo was stable but with new symptoms would proceed with stress echo\par #2 RHD- No evidence arrhythmia, INR stable with home monitoring\par #3 General- emotional support provided.

## 2021-08-04 NOTE — HISTORY OF PRESENT ILLNESS
[FreeTextEntry1] : Zoe is home now but gets very short of breath with daily activities. She cannot even walk with her mother in law. However, it does not happen every time. No lightheadedness or dizziness. She now does less and less.

## 2021-09-14 ENCOUNTER — APPOINTMENT (OUTPATIENT)
Dept: CV DIAGNOSITCS | Facility: HOSPITAL | Age: 41
End: 2021-09-14

## 2021-10-13 ENCOUNTER — APPOINTMENT (OUTPATIENT)
Dept: CV DIAGNOSITCS | Facility: HOSPITAL | Age: 41
End: 2021-10-13

## 2021-10-13 ENCOUNTER — OUTPATIENT (OUTPATIENT)
Dept: OUTPATIENT SERVICES | Facility: HOSPITAL | Age: 41
LOS: 1 days | End: 2021-10-13
Payer: COMMERCIAL

## 2021-10-13 DIAGNOSIS — R06.02 SHORTNESS OF BREATH: ICD-10-CM

## 2021-10-13 DIAGNOSIS — I35.1 NONRHEUMATIC AORTIC (VALVE) INSUFFICIENCY: ICD-10-CM

## 2021-10-13 DIAGNOSIS — R07.89 OTHER CHEST PAIN: ICD-10-CM

## 2021-10-13 PROCEDURE — 93325 DOPPLER ECHO COLOR FLOW MAPG: CPT

## 2021-10-13 PROCEDURE — 93320 DOPPLER ECHO COMPLETE: CPT | Mod: 26

## 2021-10-13 PROCEDURE — 93351 STRESS TTE COMPLETE: CPT

## 2021-10-13 PROCEDURE — 93320 DOPPLER ECHO COMPLETE: CPT

## 2021-10-13 PROCEDURE — 93325 DOPPLER ECHO COLOR FLOW MAPG: CPT | Mod: 26

## 2021-10-13 PROCEDURE — 93350 STRESS TTE ONLY: CPT | Mod: 26

## 2021-10-14 ENCOUNTER — APPOINTMENT (OUTPATIENT)
Dept: CARDIOLOGY | Facility: CLINIC | Age: 41
End: 2021-10-14
Payer: COMMERCIAL

## 2021-10-14 VITALS
BODY MASS INDEX: 23.7 KG/M2 | SYSTOLIC BLOOD PRESSURE: 112 MMHG | DIASTOLIC BLOOD PRESSURE: 73 MMHG | HEIGHT: 67 IN | WEIGHT: 151 LBS | HEART RATE: 70 BPM | OXYGEN SATURATION: 100 %

## 2021-10-14 DIAGNOSIS — I35.1 NONRHEUMATIC AORTIC (VALVE) INSUFFICIENCY: ICD-10-CM

## 2021-10-14 DIAGNOSIS — R06.00 DYSPNEA, UNSPECIFIED: ICD-10-CM

## 2021-10-14 PROCEDURE — 99214 OFFICE O/P EST MOD 30 MIN: CPT

## 2021-10-14 PROCEDURE — 93000 ELECTROCARDIOGRAM COMPLETE: CPT

## 2021-10-16 ENCOUNTER — NON-APPOINTMENT (OUTPATIENT)
Age: 41
End: 2021-10-16

## 2021-10-16 PROBLEM — R06.00 DYSPNEA ON EXERTION: Status: ACTIVE | Noted: 2021-01-28

## 2021-10-16 PROBLEM — I35.1 MODERATE AORTIC REGURGITATION: Status: ACTIVE | Noted: 2018-02-09

## 2021-10-16 NOTE — DISCUSSION/SUMMARY
[FreeTextEntry1] : The patient is a 41-year-old female history of rheumatic heart disease, mechanical mitral and aortic valve replacement s/p reop with new onset dyspnea and exercise induced hypertension and pulmonary hypertension\par #1 CV- results reviewed and recommend proceeding with FELISHA before medication changes\par #2 RHD- No evidence arrhythmia, INR stable with home monitoring\par #3 General- emotional support provided.

## 2021-10-16 NOTE — HISTORY OF PRESENT ILLNESS
[FreeTextEntry1] : Zoe had her stress echo yesterday. Discussed with Dr. Mejía and reviewed with patient. She knows she cannot even go for a walk with her  after dinner because so short of breath and has to stop. No rest dyspnea, chest pain, palpitations or dizziness.

## 2021-10-16 NOTE — REVIEW OF SYSTEMS
[SOB] : shortness of breath [Dyspnea on exertion] : dyspnea during exertion [Negative] : Heme/Lymph [Chest Discomfort] : no chest discomfort [Lower Ext Edema] : no extremity edema [Leg Claudication] : no intermittent leg claudication [Palpitations] : no palpitations [Syncope] : no syncope

## 2021-10-22 ENCOUNTER — APPOINTMENT (OUTPATIENT)
Dept: DISASTER EMERGENCY | Facility: CLINIC | Age: 41
End: 2021-10-22

## 2021-10-24 LAB — SARS-COV-2 N GENE NPH QL NAA+PROBE: NOT DETECTED

## 2021-10-25 ENCOUNTER — LABORATORY RESULT (OUTPATIENT)
Age: 41
End: 2021-10-25

## 2021-10-25 ENCOUNTER — OUTPATIENT (OUTPATIENT)
Dept: OUTPATIENT SERVICES | Facility: HOSPITAL | Age: 41
LOS: 1 days | End: 2021-10-25
Payer: COMMERCIAL

## 2021-10-25 ENCOUNTER — APPOINTMENT (OUTPATIENT)
Dept: CV DIAGNOSITCS | Facility: HOSPITAL | Age: 41
End: 2021-10-25

## 2021-10-25 DIAGNOSIS — I09.9 RHEUMATIC HEART DISEASE, UNSPECIFIED: ICD-10-CM

## 2021-10-25 DIAGNOSIS — I35.1 NONRHEUMATIC AORTIC (VALVE) INSUFFICIENCY: ICD-10-CM

## 2021-10-25 LAB
INR PPP: 2.16 RATIO
PT BLD: 25 SEC

## 2021-10-25 PROCEDURE — 76377 3D RENDER W/INTRP POSTPROCES: CPT | Mod: 26

## 2021-10-25 PROCEDURE — 76377 3D RENDER W/INTRP POSTPROCES: CPT

## 2021-10-25 PROCEDURE — 93306 TTE W/DOPPLER COMPLETE: CPT

## 2021-10-25 PROCEDURE — 93306 TTE W/DOPPLER COMPLETE: CPT | Mod: 26

## 2021-10-25 PROCEDURE — 93312 ECHO TRANSESOPHAGEAL: CPT | Mod: 26

## 2021-10-25 PROCEDURE — 93312 ECHO TRANSESOPHAGEAL: CPT

## 2021-10-27 ENCOUNTER — APPOINTMENT (OUTPATIENT)
Dept: CARDIOTHORACIC SURGERY | Facility: CLINIC | Age: 41
End: 2021-10-27
Payer: COMMERCIAL

## 2021-10-27 VITALS — SYSTOLIC BLOOD PRESSURE: 111 MMHG | DIASTOLIC BLOOD PRESSURE: 66 MMHG

## 2021-10-27 DIAGNOSIS — T82.03XA LEAKAGE OF HEART VALVE PROSTHESIS, INITIAL ENCOUNTER: ICD-10-CM

## 2021-10-27 PROCEDURE — 99204 OFFICE O/P NEW MOD 45 MIN: CPT

## 2021-10-27 RX ORDER — CHOLECALCIFEROL (VITAMIN D3) 50 MCG
50 MCG TABLET ORAL
Refills: 0 | Status: COMPLETED | COMMUNITY
End: 2021-10-27

## 2021-10-27 NOTE — DATA REVIEWED
[FreeTextEntry1] : 10/25/21 FELISHA revealed Mechanical prosthetic mitral valve replacement. The leaflets are seen to open and close normally.  paravalvular jets are seen. One at lateral commissure with MR into the Left atrial appendage, one at medial commissure and eccentric to the LA and one at P2 and eccentric into\par the LA. There is at lease severe MR.  Mean transmitral valve gradient equals 9 mm Hg, which is elevated even in the setting of a mechanical prosthetic mitral valve replacement.HR 60-80\par 2. Mechanical aortic valve prosthesis.  The Aortic valve has a paravalvular jet posteriorly 5-6 O'clock (clip 88);There is flow into a pocket 2.8cm x 1.8cm posterior to the aortic MVR. There are small flows on short axis  into this posterior space from the aortic valve replacement vs paravalvular jets.  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  The leaflets of the aortic leaflets seem to open and close normally.  Peak transaortic valve gradient equals 27 mm Hg, mean transaortic valve gradient equals 13 mm Hg, which is probably normal in the presence of a mechanical aortic valve prosthesis. At least, moderate  paravalvular aortic regurgitation at 6 O'clock.\par 3. An annuloplasty ring is seen in the tricuspid position. Mild tricuspid regurgitation.\par \par 10/13/21 Stress Echo revealed Exercise capacity is 7 METS, which is poor for age and gender.\par  Hypertensive hemodynamic response.Abnormal. Up to 1.5 mm ST depression V2-6 beginning at\par 3:30 of exercise, resolving by 7 minutes of rest.Normal augmentation in left ventricular systolic\par function.Normal stress echocardiogram with no evidence of inducible ischemia.\par Post exercise  TR peak velocity 3.7 m/sec estimated PASP 64 mm hg.Mean trans mitral gradient 9 mm hg with heart rate 93 bpm\par \par

## 2021-10-27 NOTE — CONSULT LETTER
[Dear  ___] : Dear  [unfilled], [Courtesy Letter:] : I had the pleasure of seeing your patient, [unfilled], in my office today. [Please see my note below.] : Please see my note below. [Consult Closing:] : Thank you very much for allowing me to participate in the care of this patient.  If you have any questions, please do not hesitate to contact me. [Sincerely,] : Sincerely, [FreeTextEntry2] : Dr.Donna Flores [FreeTextEntry3] : Nirmala Adorno MD\par Attending Surgeon \par [facility]\par  \par Cardiovascular & Thoracic Surgery\par [facility] [employer]\par \par

## 2021-10-27 NOTE — ASSESSMENT
[FreeTextEntry1] : Zoe is a 37 year old female with past medical history of [facility] aortic and mitral valve replacements in 1996 by Dr. 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 She reports occasional shortness of breath with mild  with activities and fatigue . 10/25/21 FELISHA revealed Mechanical prosthetic mitral valve replacement. The leaflets are seen to open and close normally.  paravalvular jets are seen. One at lateral commissure with MR into the Left atrial appendage, one at medial commissure and eccentric to the LA and one at P2 and eccentric into the LA. There is at lease severe MR.  Mean transmitral valve gradient equals 9 mm Hg, which is elevated even in the setting of a mechanical prosthetic mitral valve replacement.HR 60-80. Mechanical aortic valve prosthesis.  The Aortic valve has a paravalvular jet posteriorly 5-6 O'clock (clip 88);There is flow into a pocket 2.8cm x 1.8cm posterior to the aortic MVR. There are small flows on short axis  into this posterior space from the aortic valve replacement vs paravalvular jets.  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  The leaflets of the aortic leaflets seem to open and close normally.  Peak transaortic valve gradient equals 27 mm Hg, mean transaortic valve gradient equals 13 mm Hg, which is probably normal in the presence of a mechanical aortic valve prosthesis. At least, moderate  paravalvular aortic regurgitation at 6 O'clock. An annuloplasty ring is seen in the tricuspid position. Mild tricuspid regurgitation.10/13/21 Stress Echo revealed Exercise capacity is 7 METS, which is poor for age and gender. Hypertensive hemodynamic response.Abnormal. Up to 1.5 mm ST depression V2-6 beginning at 3:30 of exercise, resolving by 7 minutes of rest.Normal augmentation in left ventricular systolic\par function.Normal stress echocardiogram with no evidence of inducible ischemia.Post exercise  TR peak velocity 3.7 m/sec estimated PASP 64 mm hg.Mean trans mitral gradient 9 mm hg with heart rate 93 bpm. She can walk  2 miles some days and some days  1 block. She will be seeing Dr.Paul Bennett for a second opinion. She is here for initial evaluation and management for Mitral and aortic paravalvular leak. Denies any chest pain, palpitations, dizziness , syncope, fever, chills or pedal edema. \par \par \par \par   reviewed the cardiac imaging, medical records and reports with patient and discussed the case. 10/25/21 FELISHA revealed Mechanical prosthetic mitral valve replacement. The leaflets are seen to open and close normally.  paravalvular jets are seen. One at lateral commissure with MR into the Left atrial appendage, one at medial commissure and eccentric to the LA and one at P2 and eccentric into the LA. There is at lease severe MR.  Mean transmitral valve gradient equals 9 mm Hg, which is elevated even in the setting of a mechanical prosthetic mitral valve replacement.HR 60-80\par 2. Mechanical aortic valve prosthesis.  The Aortic valve has a paravalvular jet posteriorly 5-6 O'clock (clip 88);There is flow into a pocket 2.8cm x 1.8cm posterior to the aortic MVR. There are small flows on short axis  into this posterior space from the aortic valve replacement vs paravalvular jets.  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  The leaflets of the aortic leaflets seem to open and close normally.  Peak transaortic valve gradient equals 27 mm Hg, mean transaortic valve gradient equals 13 mm Hg, which is probably normal in the presence of a mechanical aortic valve prosthesis. At least, moderate  paravalvular aortic regurgitation at 6 O'clock.\par 3. An annuloplasty ring is seen in the tricuspid position. Mild tricuspid regurgitation.\par \par 10/13/21 Stress Echo revealed Exercise capacity is 7 METS, which is poor for age and gender. Hypertensive hemodynamic response.Abnormal. Up to 1.5 mm ST depression V2-6 beginning at 3:30 of exercise, resolving by 7 minutes of rest.Normal augmentation in left ventricular systolic function.Normal stress echocardiogram with no evidence of inducible ischemia.Post exercise  TR peak velocity 3.7 m/sec estimated PASP 64 mm hg.Mean trans mitral gradient 9 mm hg with heart rate 93 bpm\par \par \par  discussed the risks , benefits and alternatives to surgery. Risks included but not limited to  bleeding , stroke, Myocardial Infarction, kidney problems,Blood transfusion ,permanent  pacemaker implantation,  infections and death.   quoted a low (5 % ) operative mortality and complication risks.  also discussed the various approaches in detail. feel that the patient will benefit and is a candidate for a REOP Mitral valve replacement , Aortic valve replacement , Tricuspid valve repair  . All questions and concerns were addressed and patient will call back with decision. \par \par \par Plan:\par 1) REOP Mitral valve replacement , Reop Aortic valve replacement , Reop Tricuspid valve repair \par 2) Cardiac Catherization to evaluate coronary arteries\par 3) Admit for Heparin drip \par 4) May return to clinic on PRN basis \par \par \par

## 2021-10-27 NOTE — PHYSICAL EXAM
[General Appearance - Alert] : alert [General Appearance - In No Acute Distress] : in no acute distress [General Appearance - Well Nourished] : well nourished [General Appearance - Well Developed] : well developed [Sclera] : the sclera and conjunctiva were normal [PERRL With Normal Accommodation] : pupils were equal in size, round, and reactive to light [Outer Ear] : the ears and nose were normal in appearance [Both Tympanic Membranes Were Examined] : both tympanic membranes were normal [Neck Appearance] : the appearance of the neck was normal [] : no respiratory distress [Respiration, Rhythm And Depth] : normal respiratory rhythm and effort [Auscultation Breath Sounds / Voice Sounds] : lungs were clear to auscultation bilaterally [Apical Impulse] : the apical impulse was normal [Heart Rate And Rhythm] : heart rate was normal and rhythm regular [Heart Sounds] : normal S1 and S2 [Examination Of The Chest] : the chest was normal in appearance [2+] : left 2+ [Breast Appearance] : normal in appearance [Bowel Sounds] : normal bowel sounds [Abdomen Soft] : soft [No CVA Tenderness] : no ~M costovertebral angle tenderness [Abnormal Walk] : normal gait [Involuntary Movements] : no involuntary movements were seen [Skin Color & Pigmentation] : normal skin color and pigmentation [Skin Turgor] : normal skin turgor [No Focal Deficits] : no focal deficits [Oriented To Time, Place, And Person] : oriented to person, place, and time [Impaired Insight] : insight and judgment were intact [Affect] : the affect was normal [Mood] : the mood was normal [Memory Recent] : recent memory was not impaired [Memory Remote] : remote memory was not impaired [FreeTextEntry1] : Deferred

## 2021-10-27 NOTE — HISTORY OF PRESENT ILLNESS
[FreeTextEntry1] : Zoe is a 37 year old female with past medical history of [facility] aortic and mitral valve replacements in 1996 by Dr. Adorno (Coumadin followed by  ), Re op [facility]James Mechanical mitral valve  with 31/33 mm ON-X valve and Aortic valve replacement with 21 mm ON-X mechanical valve,Tricuspid valve repair , Aortic root and LT atrial roof enlargement with Bovine pericardial patch on 4/18/2018 in [place] with Dr.Paul Valdes  with complaints of dyspnea on exertion since Dec 2019 . She reports occasional shortness of breath with mild  with activities and fatigue . 10/25/21 FELISHA revealed Mechanical prosthetic mitral valve replacement. The leaflets are seen to open and close normally.  paravalvular jets are seen. One at lateral commissure with MR into the Left atrial appendage, one at medial commissure and eccentric to the LA and one at P2 and eccentric into the LA. There is at lease severe MR.  Mean transmitral valve gradient equals 9 mm Hg, which is elevated even in the setting of a mechanical prosthetic mitral valve replacement.HR 60-80. Mechanical aortic valve prosthesis.  The Aortic valve has a paravalvular jet posteriorly 5-6 O'clock (clip 88);There is flow into a pocket 2.8cm x 1.8cm posterior to the aortic MVR. There are small flows on short axis  into this posterior space from the aortic valve replacement vs paravalvular jets.  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  The leaflets of the aortic leaflets seem to open and close normally.  Peak transaortic valve gradient equals 27 mm Hg, mean transaortic valve gradient equals 13 mm Hg, which is probably normal in the presence of a mechanical aortic valve prosthesis. At least, moderate  paravalvular aortic regurgitation at 6 O'clock. An annuloplasty ring is seen in the tricuspid position. Mild tricuspid regurgitation.10/13/21 Stress Echo revealed Exercise capacity is 7 METS, which is poor for age and gender. Hypertensive hemodynamic response.Abnormal. Up to 1.5 mm ST depression V2-6 beginning at 3:30 of exercise, resolving by 7 minutes of rest.Normal augmentation in left ventricular systolic\par function.Normal stress echocardiogram with no evidence of inducible ischemia.Post exercise  TR peak velocity 3.7 m/sec estimated PASP 64 mm hg.Mean trans mitral gradient 9 mm hg with heart rate 93 bpm. She can walk  2 miles some days and some days  1 block. She will be seeing Dr.Paul Bennett for a second opinion. She is here for initial evaluation and management for Mitral and aortic paravalvular leak. Denies any chest pain, palpitations, dizziness , syncope, fever, chills or pedal edema. \par \par COVID VACCINE: Did not receive COVID vaccine. \par

## 2021-10-27 NOTE — REVIEW OF SYSTEMS
[Feeling Tired] : feeling tired [Shortness Of Breath] : shortness of breath [SOB on Exertion] : shortness of breath during exertion [Negative] : Heme/Lymph

## 2021-10-27 NOTE — END OF VISIT
[FreeTextEntry3] : \par I personally scribed for MOE OKEEFE on Oct 27 2021 11:00AM . \par \par \par \par \par Physician Attestation:\par Documented by DANN KING acting as a scribe for MOE OKEEFE 10/27/2021 . \par                 All medical record entries made by the Scribe were at my, MOE OKEEFE , direction and personally dictated by me on 10/27/2021 . I have reviewed the chart and agree that the record accurately reflects my personal performance of the history, physical exam, assessment and plan\par \par

## 2021-12-20 ENCOUNTER — NON-APPOINTMENT (OUTPATIENT)
Age: 41
End: 2021-12-20

## 2022-01-03 ENCOUNTER — FORM ENCOUNTER (OUTPATIENT)
Age: 42
End: 2022-01-03

## 2022-01-17 ENCOUNTER — FORM ENCOUNTER (OUTPATIENT)
Age: 42
End: 2022-01-17

## 2022-08-22 ENCOUNTER — APPOINTMENT (OUTPATIENT)
Dept: OBGYN | Facility: CLINIC | Age: 42
End: 2022-08-22

## 2022-08-22 VITALS
DIASTOLIC BLOOD PRESSURE: 77 MMHG | SYSTOLIC BLOOD PRESSURE: 111 MMHG | WEIGHT: 150 LBS | BODY MASS INDEX: 23.54 KG/M2 | HEIGHT: 67 IN

## 2022-08-22 DIAGNOSIS — I09.9 RHEUMATIC HEART DISEASE, UNSPECIFIED: ICD-10-CM

## 2022-08-22 DIAGNOSIS — T83.32XA DISPLACEMENT OF INTRAUTERINE CONTRACEPTIVE DEVICE, INITIAL ENCOUNTER: ICD-10-CM

## 2022-08-22 DIAGNOSIS — Z30.09 ENCOUNTER FOR OTHER GENERAL COUNSELING AND ADVICE ON CONTRACEPTION: ICD-10-CM

## 2022-08-22 LAB
HCG UR QL: NEGATIVE
QUALITY CONTROL: YES

## 2022-08-22 PROCEDURE — 81025 URINE PREGNANCY TEST: CPT

## 2022-08-22 PROCEDURE — 58300 INSERT INTRAUTERINE DEVICE: CPT

## 2022-08-22 PROCEDURE — 76998 US GUIDE INTRAOP: CPT

## 2022-08-22 PROCEDURE — 58301 REMOVE INTRAUTERINE DEVICE: CPT

## 2022-08-22 PROCEDURE — 99203 OFFICE O/P NEW LOW 30 MIN: CPT | Mod: 25

## 2022-08-23 LAB
C TRACH RRNA SPEC QL NAA+PROBE: NOT DETECTED
N GONORRHOEA RRNA SPEC QL NAA+PROBE: NOT DETECTED
SOURCE AMPLIFICATION: NORMAL

## 2022-10-26 ENCOUNTER — RESULT REVIEW (OUTPATIENT)
Age: 42
End: 2022-10-26

## 2022-11-22 ENCOUNTER — APPOINTMENT (OUTPATIENT)
Dept: MAMMOGRAPHY | Facility: CLINIC | Age: 42
End: 2022-11-22

## 2022-11-22 ENCOUNTER — APPOINTMENT (OUTPATIENT)
Dept: ULTRASOUND IMAGING | Facility: CLINIC | Age: 42
End: 2022-11-22

## 2023-06-02 ENCOUNTER — NON-APPOINTMENT (OUTPATIENT)
Age: 43
End: 2023-06-02

## 2023-06-02 ENCOUNTER — APPOINTMENT (OUTPATIENT)
Dept: CARDIOLOGY | Facility: CLINIC | Age: 43
End: 2023-06-02
Payer: COMMERCIAL

## 2023-06-02 VITALS
HEART RATE: 93 BPM | SYSTOLIC BLOOD PRESSURE: 108 MMHG | BODY MASS INDEX: 24.28 KG/M2 | WEIGHT: 155 LBS | OXYGEN SATURATION: 98 % | DIASTOLIC BLOOD PRESSURE: 68 MMHG

## 2023-06-02 PROCEDURE — 93000 ELECTROCARDIOGRAM COMPLETE: CPT

## 2023-06-02 PROCEDURE — 99205 OFFICE O/P NEW HI 60 MIN: CPT | Mod: 25

## 2023-06-02 NOTE — PHYSICAL EXAM
[Well Developed] : well developed [Well Nourished] : well nourished [No Acute Distress] : no acute distress [Normal Conjunctiva] : normal conjunctiva [Normal Venous Pressure] : normal venous pressure [No Carotid Bruit] : no carotid bruit [Normal S1, S2] : normal S1, S2 [No Murmur] : no murmur [No Rub] : no rub [No Gallop] : no gallop [Clear Lung Fields] : clear lung fields [Good Air Entry] : good air entry [No Respiratory Distress] : no respiratory distress  [Soft] : abdomen soft [Non Tender] : non-tender [No Masses/organomegaly] : no masses/organomegaly [Normal Bowel Sounds] : normal bowel sounds [Normal Gait] : normal gait [No Edema] : no edema [No Cyanosis] : no cyanosis [No Clubbing] : no clubbing [No Varicosities] : no varicosities [No Rash] : no rash [No Skin Lesions] : no skin lesions [Moves all extremities] : moves all extremities [No Focal Deficits] : no focal deficits [Normal Speech] : normal speech [Alert and Oriented] : alert and oriented [Normal memory] : normal memory Complex Repair And Rotation Flap Text: The defect edges were debeveled with a #15 scalpel blade.  The primary defect was closed partially with a complex linear closure.  Given the location of the remaining defect, shape of the defect and the proximity to free margins a rotation flap was deemed most appropriate for complete closure of the defect.  Using a sterile surgical marker, an appropriate advancement flap was drawn incorporating the defect and placing the expected incisions within the relaxed skin tension lines where possible.    The area thus outlined was incised deep to adipose tissue with a #15 scalpel blade.  The skin margins were undermined to an appropriate distance in all directions utilizing iris scissors.

## 2023-06-02 NOTE — DISCUSSION/SUMMARY
[FreeTextEntry1] : 43 year old female with past medical history of atrial flutter [facility]. James aortic and mitral valve replacements x 3 now with root replacement, and most recent intervention surgically 2/2022 who is here for first time for transition of cardiology care. I reviewed her symptoms, and echocardiogram, with gradients WNL. She remains in atrial flutter and reports no cardioversion or ablation in the past. I suspect cardioversion, although not done in the past, will be ineffective given long standing flutter and LA dilation. She potentially may be ablation candidate. Will further discuss with EP team regarding options for treating her flutter which I believe is likely driving some of her exertional symptoms. Will also obtain more records from [facility]. Patient to continue AC.\par \par Differential diagnosis and plan of care discussed with patient after the evaluation. \par Counseling on diet, exercise, and medication compliance was done.\par Counseling on smoking and alcohol cessation was offered when appropriate.\par Pain assessed and judicious use of narcotics when appropriate was discussed.\par Importance of fall prevention discussed.\par Advanced care planning was discussed with patient and family.\par  [EKG obtained to assist in diagnosis and management of assessed problem(s)] : EKG obtained to assist in diagnosis and management of assessed problem(s)

## 2023-06-02 NOTE — DISCUSSION/SUMMARY
[FreeTextEntry1] : 43 year old female with past medical history of atrial flutter [facility] aortic and mitral valve replacements x 3 now with root replacement, and most recent intervention surgically 2/2022 who is here for first time for transition of cardiology care. I reviewed her symptoms, and echocardiogram, with gradients WNL. She remains in atrial flutter and reports no cardioversion or ablation in the past. I suspect cardioversion, although not done in the past, will be ineffective given long standing flutter and LA dilation. She potentially may be ablation candidate. Will further discuss with EP team regarding options for treating her flutter which I believe is likely driving some of her exertional symptoms. Will also obtain more records from [facility]. Patient to continue AC.\par \par Differential diagnosis and plan of care discussed with patient after the evaluation. \par Counseling on diet, exercise, and medication compliance was done.\par Counseling on smoking and alcohol cessation was offered when appropriate.\par Pain assessed and judicious use of narcotics when appropriate was discussed.\par Importance of fall prevention discussed.\par Advanced care planning was discussed with patient and family.\par  [EKG obtained to assist in diagnosis and management of assessed problem(s)] : EKG obtained to assist in diagnosis and management of assessed problem(s)

## 2023-06-02 NOTE — HISTORY OF PRESENT ILLNESS
[FreeTextEntry1] : 43 year old female with past medical history of [facility]. James aortic and mitral valve replacements in 1996 by Dr. Adorno (Coumadin followed by  ), Re op [facility].James Mechanical mitral valve with 31/33 mm ON-X valve and Aortic valve replacement with 21 mm ON-X mechanical valve,Tricuspid valve repair , Aortic root and LT atrial roof enlargement with Bovine pericardial patch on 4/18/2018 in [place] with Dr.Paul Valdes with complaints of dyspnea on exertion since Dec 2019

## 2023-06-02 NOTE — HISTORY OF PRESENT ILLNESS
[FreeTextEntry1] : 43 year old female with past medical history of [facility] James aortic and mitral valve replacements in 1996 by Dr. Adorno (Coumadin followed by  ), Re op [facility].James Mechanical mitral valve with 31/33 mm ON-X valve and Aortic valve replacement with 21 mm ON-X mechanical valve,Tricuspid valve repair , Aortic root and LT atrial roof enlargement with Bovine pericardial patch on 4/18/2018 in [place] with Dr.Paul Valdes with complaints of dyspnea on exertion since Dec 2019

## 2023-06-22 ENCOUNTER — APPOINTMENT (OUTPATIENT)
Dept: ELECTROPHYSIOLOGY | Facility: CLINIC | Age: 43
End: 2023-06-22
Payer: COMMERCIAL

## 2023-06-22 ENCOUNTER — NON-APPOINTMENT (OUTPATIENT)
Age: 43
End: 2023-06-22

## 2023-06-22 VITALS
BODY MASS INDEX: 24.59 KG/M2 | HEART RATE: 72 BPM | SYSTOLIC BLOOD PRESSURE: 118 MMHG | DIASTOLIC BLOOD PRESSURE: 70 MMHG | WEIGHT: 157 LBS | OXYGEN SATURATION: 99 %

## 2023-06-22 DIAGNOSIS — Z98.890 OTHER SPECIFIED POSTPROCEDURAL STATES: ICD-10-CM

## 2023-06-22 PROCEDURE — 99205 OFFICE O/P NEW HI 60 MIN: CPT | Mod: 25

## 2023-06-22 PROCEDURE — 93000 ELECTROCARDIOGRAM COMPLETE: CPT

## 2023-06-22 NOTE — DISCUSSION/SUMMARY
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 James aortic and mitral valve replacements in 1996 by Dr. Nirmala Adorno, a second surgery by Dr. Gary Valdes on 4/18/2018 that involved a re-operation on the aortic and mitral valves, a tricuspid and aortic root repair and a left atrial roof "enlargement" using a bovine pericardial patch. In February of 2022 she had a third operation by Dr. Radha Mejia from [facility] involving an aortic root replacement and a mechanical aortic valve along with a mechanical mitral valve replacement and reconstruction of the aortomitral curtain as well as prior atrial fibrillation. She presents today for an initial evaluation of symptomatic, persistent, atypical atrial flutter. Given her multiple prior operations she has multiple possible atypical flutter circuits.\par \par We discussed the various treatment options for both atrial fibrillation and atrial flutter including both rate and rhythm control approaches. Given her symptoms burden I recommended a rhythm control approach. We discussed that the long-term success rates for a cardioversion for patients with atrial flutter are low. I suggested a combination of a cardioversion with an antiarrhythmic medication versus a catheter ablation. The patient's preference is to avoid medications.\par \par We discussed the risks of a catheter ablation of atrial fibrillation and atypical atrial flutter  - in addition to complications related to anesthesia, the ablation procedure carries a 1% risk of complications including, but not limited to: catheter entrapment in the mitral valve, vascular injury, VTE, MI, cardiac perforation, pulmonary vein stenosis, esophageal injury, or phrenic nerve injury. There is also a 0.2% risk of a potentially catastrophic complication including stroke or even death. \par \par The patient agreed to move forward with scheduling the ablation. [EKG obtained to assist in diagnosis and management of assessed problem(s)] : EKG obtained to assist in diagnosis and management of assessed problem(s)

## 2023-06-22 NOTE — HISTORY OF PRESENT ILLNESS
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 James aortic and mitral valve replacements in 1996 by Dr. Nirmala Adorno, a second surgery by Dr. Gary Valdes on 4/18/2018 that involved a re-operation on the aortic and mitral valves, a tricuspid and aortic root repair and a left atrial roof "enlargement" using a bovine pericardial patch. In February of 2022 she had a third operation by Dr. Radha Mejia from [facility] involving an aortic root replacement and a mechanical aortic valve along with a mechanical mitral valve replacement and reconstruction of the aortomitral curtain. Her discharge summary from her last admission also documents "rapid atrial fibrillation." She was treated with amiodarone previously.\par \par She presents today for an initial evaluation of an atrial flutter. The patient's atrial flutter was diagnosed following her third open heart operation in February of 2022. She brought a copy of a Zio XT monitor from January of 2023 that demonstrated a 100% burden of atrial flutter. Her heart rate ranged from 56 beats per minute to 151 beats per minute with an average heart rate of 88 beats per minute. She remained in atrial flutter at her office visit with Dr. Martel on June 2nd 2023. She reports that she has had palpitations since her most recent surgery and currently experiences palpitations on a daily basis. She describes her palpitations as a "rough" and fast heart beat. Her symptoms are worse with exertion. She also endorses shortness of breath and dizziness. For years she has been monitoring her INR at home. She reports that her INR has not been subtherapeutic recently.

## 2023-06-22 NOTE — PHYSICAL EXAM
[Well Developed] : well developed [Well Nourished] : well nourished [No Acute Distress] : no acute distress [Normal Conjunctiva] : normal conjunctiva [Normal Venous Pressure] : normal venous pressure [No Murmur] : no murmur [No Rub] : no rub [No Gallop] : no gallop [Clear Lung Fields] : clear lung fields [Good Air Entry] : good air entry [Non Tender] : non-tender [Soft] : abdomen soft [Normal Gait] : normal gait [No Edema] : no edema [No Rash] : no rash [Moves all extremities] : moves all extremities [Normal Speech] : normal speech [Alert and Oriented] : alert and oriented [Normal memory] : normal memory [de-identified] : S1 and S2 with click

## 2023-06-22 NOTE — CARDIOLOGY SUMMARY
[de-identified] : ECG from 6/22/2023: Atrial flutter with ventricular rate of 71 beats per minute\par ECG from 6/2/2023: Atrial flutter with ventricular rate of 85, isoelectric segment in between the flutter waves, isoelectric flutter wave in V1, negative flutter waves in the inferior leads\par ECG from 10/16/2021: Sinus with MN: 226ms, normal axis, QRSd: 90ms [de-identified] : TTE from 11/30/2022: EF: 65-70%, normal LV wall thickness, mechanical AVR and MVR, no significant AI, upper limited of RV size with normal RVSF, tricuspid valve with small echogenic focus on the anterior leaflet, stable from prior study, mild-mod TR, atrial septum is intact\par \par FELISHA from 10/25/2021: mechanical MVR, 3 paravalvular jets of MR - severe MR, mechanical AVR - flow into pocket posterior the aortic valve, fistular seen from this posterior pocket into the LA, no LA or TEA thrombus, normal RA, normal RV size and function, TV annuloplasty, no pericardial effusion [de-identified] : Jdo XT from 8/3/2021-8/10/2021: min HR: 57, max HR: 164, mean HR: 78, no AF or AFl, 7 runs of non-sustained AT

## 2023-07-03 ENCOUNTER — NON-APPOINTMENT (OUTPATIENT)
Age: 43
End: 2023-07-03

## 2023-07-10 ENCOUNTER — OUTPATIENT (OUTPATIENT)
Dept: INPATIENT UNIT | Facility: HOSPITAL | Age: 43
LOS: 1 days | End: 2023-07-10
Payer: COMMERCIAL

## 2023-07-10 ENCOUNTER — TRANSCRIPTION ENCOUNTER (OUTPATIENT)
Age: 43
End: 2023-07-10

## 2023-07-10 VITALS
DIASTOLIC BLOOD PRESSURE: 53 MMHG | OXYGEN SATURATION: 99 % | RESPIRATION RATE: 16 BRPM | HEART RATE: 86 BPM | SYSTOLIC BLOOD PRESSURE: 104 MMHG

## 2023-07-10 VITALS
WEIGHT: 156.09 LBS | HEIGHT: 66 IN | RESPIRATION RATE: 16 BRPM | TEMPERATURE: 98 F | HEART RATE: 75 BPM | DIASTOLIC BLOOD PRESSURE: 76 MMHG | OXYGEN SATURATION: 98 % | SYSTOLIC BLOOD PRESSURE: 125 MMHG

## 2023-07-10 DIAGNOSIS — I48.4 ATYPICAL ATRIAL FLUTTER: ICD-10-CM

## 2023-07-10 DIAGNOSIS — Z95.2 PRESENCE OF PROSTHETIC HEART VALVE: Chronic | ICD-10-CM

## 2023-07-10 LAB
ANION GAP SERPL CALC-SCNC: 11 MMOL/L — SIGNIFICANT CHANGE UP (ref 5–17)
BLD GP AB SCN SERPL QL: NEGATIVE — SIGNIFICANT CHANGE UP
BUN SERPL-MCNC: 10 MG/DL — SIGNIFICANT CHANGE UP (ref 7–23)
CALCIUM SERPL-MCNC: 9.7 MG/DL — SIGNIFICANT CHANGE UP (ref 8.4–10.5)
CHLORIDE SERPL-SCNC: 103 MMOL/L — SIGNIFICANT CHANGE UP (ref 96–108)
CO2 SERPL-SCNC: 24 MMOL/L — SIGNIFICANT CHANGE UP (ref 22–31)
CREAT SERPL-MCNC: 0.44 MG/DL — LOW (ref 0.5–1.3)
EGFR: 123 ML/MIN/1.73M2 — SIGNIFICANT CHANGE UP
GLUCOSE SERPL-MCNC: 91 MG/DL — SIGNIFICANT CHANGE UP (ref 70–99)
HCG SERPL-ACNC: <2 MIU/ML — SIGNIFICANT CHANGE UP
HCT VFR BLD CALC: 36.9 % — SIGNIFICANT CHANGE UP (ref 34.5–45)
HGB BLD-MCNC: 11.8 G/DL — SIGNIFICANT CHANGE UP (ref 11.5–15.5)
INR BLD: 3.15 RATIO — HIGH (ref 0.88–1.16)
MAGNESIUM SERPL-MCNC: 1.8 MG/DL — SIGNIFICANT CHANGE UP (ref 1.6–2.6)
MCHC RBC-ENTMCNC: 28.1 PG — SIGNIFICANT CHANGE UP (ref 27–34)
MCHC RBC-ENTMCNC: 32 GM/DL — SIGNIFICANT CHANGE UP (ref 32–36)
MCV RBC AUTO: 87.9 FL — SIGNIFICANT CHANGE UP (ref 80–100)
NRBC # BLD: 0 /100 WBCS — SIGNIFICANT CHANGE UP (ref 0–0)
PLATELET # BLD AUTO: 165 K/UL — SIGNIFICANT CHANGE UP (ref 150–400)
POTASSIUM SERPL-MCNC: 3.8 MMOL/L — SIGNIFICANT CHANGE UP (ref 3.5–5.3)
POTASSIUM SERPL-SCNC: 3.8 MMOL/L — SIGNIFICANT CHANGE UP (ref 3.5–5.3)
PROTHROM AB SERPL-ACNC: 36.6 SEC — HIGH (ref 10.5–13.4)
RBC # BLD: 4.2 M/UL — SIGNIFICANT CHANGE UP (ref 3.8–5.2)
RBC # FLD: 15.1 % — HIGH (ref 10.3–14.5)
RH IG SCN BLD-IMP: POSITIVE — SIGNIFICANT CHANGE UP
RH IG SCN BLD-IMP: POSITIVE — SIGNIFICANT CHANGE UP
SODIUM SERPL-SCNC: 138 MMOL/L — SIGNIFICANT CHANGE UP (ref 135–145)
WBC # BLD: 4.34 K/UL — SIGNIFICANT CHANGE UP (ref 3.8–10.5)
WBC # FLD AUTO: 4.34 K/UL — SIGNIFICANT CHANGE UP (ref 3.8–10.5)

## 2023-07-10 PROCEDURE — C1894: CPT

## 2023-07-10 PROCEDURE — 93010 ELECTROCARDIOGRAM REPORT: CPT | Mod: 77

## 2023-07-10 PROCEDURE — 86901 BLOOD TYPING SEROLOGIC RH(D): CPT

## 2023-07-10 PROCEDURE — 80048 BASIC METABOLIC PNL TOTAL CA: CPT

## 2023-07-10 PROCEDURE — C1732: CPT

## 2023-07-10 PROCEDURE — C1760: CPT

## 2023-07-10 PROCEDURE — 93005 ELECTROCARDIOGRAM TRACING: CPT

## 2023-07-10 PROCEDURE — 93656 COMPRE EP EVAL ABLTJ ATR FIB: CPT

## 2023-07-10 PROCEDURE — 93655 ICAR CATH ABLTJ DSCRT ARRHYT: CPT

## 2023-07-10 PROCEDURE — 36415 COLL VENOUS BLD VENIPUNCTURE: CPT

## 2023-07-10 PROCEDURE — C1759: CPT

## 2023-07-10 PROCEDURE — C1731: CPT

## 2023-07-10 PROCEDURE — 86850 RBC ANTIBODY SCREEN: CPT

## 2023-07-10 PROCEDURE — 86900 BLOOD TYPING SEROLOGIC ABO: CPT

## 2023-07-10 PROCEDURE — 84702 CHORIONIC GONADOTROPIN TEST: CPT

## 2023-07-10 PROCEDURE — C1769: CPT

## 2023-07-10 PROCEDURE — 93657 TX L/R ATRIAL FIB ADDL: CPT

## 2023-07-10 PROCEDURE — 85610 PROTHROMBIN TIME: CPT

## 2023-07-10 PROCEDURE — 83735 ASSAY OF MAGNESIUM: CPT

## 2023-07-10 PROCEDURE — 85027 COMPLETE CBC AUTOMATED: CPT

## 2023-07-10 RX ORDER — PANTOPRAZOLE SODIUM 20 MG/1
1 TABLET, DELAYED RELEASE ORAL
Qty: 84 | Refills: 0
Start: 2023-07-10 | End: 2023-08-20

## 2023-07-10 RX ORDER — COLCHICINE 0.6 MG
1 TABLET ORAL
Qty: 5 | Refills: 0
Start: 2023-07-10 | End: 2023-07-14

## 2023-07-10 RX ORDER — COLCHICINE 0.6 MG
0.6 TABLET ORAL DAILY
Refills: 0 | Status: DISCONTINUED | OUTPATIENT
Start: 2023-07-10 | End: 2023-07-10

## 2023-07-10 RX ORDER — WARFARIN SODIUM 2.5 MG/1
12.5 TABLET ORAL ONCE
Refills: 0 | Status: DISCONTINUED | OUTPATIENT
Start: 2023-07-10 | End: 2023-07-10

## 2023-07-10 RX ORDER — PANTOPRAZOLE SODIUM 20 MG/1
40 TABLET, DELAYED RELEASE ORAL
Refills: 0 | Status: DISCONTINUED | OUTPATIENT
Start: 2023-07-10 | End: 2023-07-10

## 2023-07-10 RX ADMIN — PANTOPRAZOLE SODIUM 40 MILLIGRAM(S): 20 TABLET, DELAYED RELEASE ORAL at 17:29

## 2023-07-10 RX ADMIN — Medication 0.6 MILLIGRAM(S): at 17:29

## 2023-07-10 NOTE — PATIENT PROFILE ADULT - FOOD INSECURITY
[facility] Progress Note                                 Date: July 12, 2021       Treatment: Ferrlecit      Ms. Bishop arrived in no acute distress at 36. Patient COVID Screening completed:   Do you have any symptoms of COVID-19? SOB, coughing, fever, or generally not feeling well? NO   Have you been exposed to COVID-19 recently? NO   Have you had any recent contact with family/friend that has a pending COVID test? NO    Assessment was unremarkable with no new concerns voiced. 24G PIV established in R A/C with positive blood return noted. Problem: Anemia Care Plan (Adult and Pediatric)  Goal: *Labs within defined limits  Outcome: Progressing Towards Goal  Goal: *Tolerates increased activity  Outcome: Progressing Towards Goal     Problem: Patient Education:  Go to Education Activity  Goal: Patient/Family Education  Outcome: Progressing Towards Goal    Ms. Bishop's vitals for today's visit. Patient Vitals for the past 12 hrs:   Temp Pulse Resp BP SpO2   07/12/21 1015  (!) 58  (!) 107/57    07/12/21 0838 98.5 °F (36.9 °C) 60 18 (!) 146/53 100 %       No labs today  Medications given:  Medications Administered     ferric gluconate (FERRLECIT) 125 mg in 0.9% sodium chloride 100 mL, overfill volume 10 mL IVPB     Admin Date  07/12/2021 Action  New Bag Dose  125 mg Rate  120 mL/hr Route  IntraVENous Administered By  Irma Peter RN                Ms. Felicia Almanza tolerated the treatment without complaints. Patient was observed 30 minutes post infusion, no adverse effects noted. PIV flushed and removed, 2x2 and coban placed. Ms. Felicia Almanza was discharged from [place] [address] in stable condition at 1045.      Future Appointments   Date Time Provider Dexter Rogers   7/19/2021  9:00  [facility] [place] INFUSION NURSE 1 CATHLEEN MACDONALD [facility]   7/26/2021  9:00  [address] INFUSION NURSE 1 CATHLEEN MACDONALD [facility]   8/2/2021  9:00  [facility] INFUSION NURSE 1 [address]  [facility]       Maria Elena Senior RN  July 12, 2021  8:51 AM no

## 2023-07-10 NOTE — ASU DISCHARGE PLAN (ADULT/PEDIATRIC) - B. DRINK ALCOHOL, BEER, OR WINE
Vikas Espana paged to call sister regarding treatment option. Sister is available at [facility] tomorrow. Statement Selected

## 2023-07-10 NOTE — H&P CARDIOLOGY - NSICDXPASTSURGICALHX_GEN_ALL_CORE_FT
PAST SURGICAL HISTORY:  Aortic Valve Replaced      Section @ 41wks secondary to fetal distress  8lbs, 9oz  Complicated by post-operative hemorrhage requiring transfusion     Section @ 35wks secondary to formation of hematoma while on anticoagulation  6lbs, 7oz  Complicated by post-operative infection    H/O mitral valve replacement     Mitral Valve Stenosis and Aortic Valve Insufficiency

## 2023-07-10 NOTE — CHART NOTE - NSCHARTNOTEFT_GEN_A_CORE
s/p Flutter Fib ablation  RFV access w 3 vascade closure     sign out from Dr Martine Rodriguez to 17:10 hours  Resume Coumadin tonight  pantoprazole 40mg PO  BID x 6 weeks  colchicine 0.6mg po daily x 7 days  EKG  For discharge at  19:00 hours if stable s/p Flutter Fib ablation  RFV access w 3 vascade closure - no hematoma or bleeding +DP    sign out from Dr Martine Rodriguez to 17:10 hours  Resume Coumadin tonight  pantoprazole 40mg PO  BID x 6 weeks  colchicine 0.6mg po daily x 7 days  EKG  For discharge at  19:00 hours if stable s/p Flutter Fib ablation  RFV access w 3 vascade closure - no hematoma or bleeding +DP    sign out from Dr Landry   Bedrest to 17:10 hours  Resume Coumadin tonight  pantoprazole 40mg PO  BID x 6 weeks  colchicine 0.6mg po daily x 7 days  EKG  For discharge at  19:00 hours if stable s/p Fib Flutterablation  RFV access w 3 vascade closure - no hematoma or bleeding +DP    sign out from Dr Landry EP  Bedrest x 2.5 hours - to 17:10 hours  Resume Coumadin tonight  pantoprazole 40mg PO  BID x 6 weeks  colchicine 0.6mg po daily x 7 days  EKG  For discharge at  19:00 hours if stable    sign out to [facility]

## 2023-07-10 NOTE — H&P CARDIOLOGY - NS ATTEND AMEND GEN_ALL_CORE FT
Patient seen in [facility] this morning. Plan for ablation of persistent atypical AFl and PVI. Consent obtained. Risks including, but not limited to vascular injury, bleeding, perforation, catheter entrapment in mechanical mitral valve, stroke, MI, phrenic paralysis, esophageal damage outlined.    SPIKE Farley

## 2023-07-10 NOTE — ASU DISCHARGE PLAN (ADULT/PEDIATRIC) - PROVIDER TOKENS
PROVIDER:[TOKEN:[[id number]],SCHEDULEDAPPT:[08/25/2023],SCHEDULEDAPPTTIME:[11:00 AM],ESTABLISHEDPATIENT:[T]]

## 2023-07-10 NOTE — ASU DISCHARGE PLAN (ADULT/PEDIATRIC) - ASU DC SPECIAL INSTRUCTIONSFT
WOUND CARE:  The day AFTER your procedure  - Remove the bandage at the site GENTLY, clean with mild soap and water, and pat dry; leave open to air  - You may shower   - DO NOT apply lotions, creams, ointments, powder, perfumes to your incision site  - Check your groin every day. A small amount of bruising or soreness is normal, a bump (smaller than nickel) might be present which is normal  - DO NOT SOAK your site for 1 week (no baths, no pools, no tubs, etc..)    ACTIVITY:  Your procedure was done through your groin  For the next 7 DAYS:  - Limit climbing stairs, no strenuous activity, pushing , pulling, or straining (especially during bowel movements)  - DO NOT LIFT anything 10 lbs or heavier   - You may resume sexual activity in 7 days, unless instructed otherwise    Mild palpitations are normal     Follow heart healthy diet recommended by your doctor, , if you smoke STOP SMOKING (may call [phone] for center of tobacco control if you need assistance).  For the next 24 hours:   - Stay at home and rest, do not drive or operate heavy machinery   Do not drink alcoholic beverages   Do not make important personal or business decisions     ***CALL YOUR DOCTOR ***  IF you have fever, chills, body aches, or severe pain, swelling, redness, heat, yellow drainage from your incision site  IF bleeding or significant new swelling from your puncture site  IF you experience rapid heartbeat or palpitations that cause: lightheadedness, dizziness, or fainting spell.  IF you experience difficulty swallowing, or pain with swallowing   IF unable to get in contact with your doctor, you may call the Cardiology Office at [facility] at [phone]

## 2023-07-10 NOTE — ASU DISCHARGE PLAN (ADULT/PEDIATRIC) - CARE PROVIDER_API CALL
Terry Farley  Cardiovascular Disease  [address] [place] [address]  Phone: [phone]  Fax: [phone]  Established Patient  Scheduled Appointment: 08/25/2023 11:00 AM

## 2023-07-10 NOTE — ASU DISCHARGE PLAN (ADULT/PEDIATRIC) - NS MD DC FALL RISK RISK
For information on Fall & Injury Prevention, visit: [email or web address] OR  [email or web address] OR  https://www.cdc.gov/steadi/patient.html

## 2023-07-10 NOTE — PATIENT PROFILE ADULT - FALL HARM RISK - UNIVERSAL INTERVENTIONS
Bed in lowest position, wheels locked, appropriate side rails in place/Call bell, personal items and telephone in reach/Instruct patient to call for assistance before getting out of bed or chair/Non-slip footwear when patient is out of bed/[place] to call system/Physically safe environment - no spills, clutter or unnecessary equipment/Purposeful Proactive Rounding/Room/bathroom lighting operational, light cord in reach

## 2023-07-10 NOTE — H&P CARDIOLOGY - HISTORY OF PRESENT ILLNESS
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 aortic and mitral valve replacements in 1996 by Dr. Nirmala Adorno, a second surgery by Dr. Gary Valdes on 4/18/2018 that involved a re-operation on the aortic and mitral valves, a tricuspid and aortic root repair and a left atrial roof "enlargement" using a bovine pericardial patch. In February of 2022 she had a third operation by Dr. Radha Mejia from [facility] involving an aortic root replacement and a mechanical aortic valve along with a mechanical mitral valve replacement and reconstruction of the aortomitral curtain. Her discharge summary from her last admission also documents "rapid atrial fibrillation." She was treated with amiodarone previously.    She presents for evaluation of an atrial flutter. The patient's atrial flutter was diagnosed following her third open heart operation in February of 2022. Zio XT monitor from January of 2023 that demonstrated a 100% burden of atrial flutter. Her heart rate ranged from 56 beats per minute to 151 beats per minute with an average heart rate of 88 beats per minute. She remained in atrial flutter at her office visit with Dr. Martel on June 2nd 2023. She reports that she has had palpitations since her most recent surgery and currently experiences palpitations on a daily basis. She describes her palpitations as a "rough" and fast heart beat. Her symptoms are worse with exertion. She also endorses shortness of breath and dizziness. For years she has been monitoring her INR at home. She reports that her INR has not been subtherapeutic recently.      Cardiology Summary    Remote/Ambulatory Rhythm Monitoring: Zio XT from 8/3/2021-8/10/2021: min HR: 57, max HR: 164, mean HR: 78, no AF or AFl, 7 runs of non-sustained AT     Echo: TTE from 11/30/2022: EF: 65-70%, normal LV wall thickness, mechanical AVR and MVR, no significant AI, upper limited of RV size with normal RVSF, tricuspid valve with small echogenic focus on the anterior leaflet, stable from prior study, mild-mod TR, atrial septum is intact    FELISHA from 10/25/2021: mechanical MVR, 3 paravalvular jets of MR - severe MR, mechanical AVR - flow into pocket posterior the aortic valve, fistular seen from this posterior pocket into the LA, no LA or TEA thrombus, normal RA, normal RV size and function, TV annuloplasty, no pericardial effusion    43 year old woman, recently found to have splenomegaly, with rheumatic heart disease (severe mitral and moderate aortic regurgitation) with prior [facility] aortic and mitral valve replacements in 1996 by Dr. Nirmala Adorno, a second surgery by Dr. Gary Valdes on 4/18/2018 that involved a re-operation on the aortic and mitral valves, a tricuspid and aortic root repair and a left atrial roof "enlargement" using a bovine pericardial patch. In February of 2022 she had a third operation by Dr. Radha Mejia from [facility] involving an aortic root replacement and a mechanical aortic valve along with a mechanical mitral valve replacement and reconstruction of the aortomitral curtain. Her discharge summary from her last admission also documents "rapid atrial fibrillation." She was treated with amiodarone previously.    She presents for evaluation of an atrial flutter. The patient's atrial flutter was diagnosed following her third open heart operation in February of 2022. Zio XT monitor from January of 2023 that demonstrated a 100% burden of atrial flutter. Her heart rate ranged from 56 beats per minute to 151 beats per minute with an average heart rate of 88 beats per minute. She remained in atrial flutter at her office visit with Dr. Martel on June 2nd 2023. She reports that she has had palpitations since her most recent surgery and currently experiences palpitations on a daily basis. She describes her palpitations as a "rough" and fast heart beat. Her symptoms are worse with exertion. She also endorses shortness of breath and dizziness. For years she has been monitoring her INR at home. She reports that her INR has not been subtherapeutic recently.   Referred for Aflutter ablation today.     Cardiology Summary    Remote/Ambulatory Rhythm Monitoring: Zio XT from 8/3/2021-8/10/2021: min HR: 57, max HR: 164, mean HR: 78, no AF or AFl, 7 runs of non-sustained AT     Echo: TTE from 11/30/2022: EF: 65-70%, normal LV wall thickness, mechanical AVR and MVR, no significant AI, upper limited of RV size with normal RVSF, tricuspid valve with small echogenic focus on the anterior leaflet, stable from prior study, mild-mod TR, atrial septum is intact    FELISHA from 10/25/2021: mechanical MVR, 3 paravalvular jets of MR - severe MR, mechanical AVR - flow into pocket posterior the aortic valve, fistular seen from this posterior pocket into the LA, no LA or TEA thrombus, normal RA, normal RV size and function, TV annuloplasty, no pericardial effusion    43 year old woman, recently found to have splenomegaly, with rheumatic heart disease (severe mitral and moderate aortic regurgitation) with prior [facility] aortic and mitral valve replacements in 1996 by Dr. Nirmala Adorno, a second surgery by Dr. Gary Valdes on 4/18/2018 that involved a re-operation on the aortic and mitral valves, a tricuspid and aortic root repair and a left atrial roof "enlargement" using a bovine pericardial patch. In February of 2022 she had a third operation by Dr. Radha Mejia from [facility] involving an aortic root replacement and a mechanical aortic valve along with a mechanical mitral valve replacement and reconstruction of the aortomitral curtain. Her discharge summary from her last admission also documents "rapid atrial fibrillation." She was treated with amiodarone previously.    She presents for evaluation of an atrial flutter. The patient's atrial flutter was diagnosed following her third open heart operation in February of 2022. Zio XT monitor from January of 2023 that demonstrated a 100% burden of atrial flutter. Her heart rate ranged from 56 beats per minute to 151 beats per minute with an average heart rate of 88 beats per minute. She remained in atrial flutter at her office visit with Dr. Martel on June 2nd 2023. She reports that she has had palpitations since her most recent surgery and currently experiences palpitations on a daily basis. She describes her palpitations as a "rough" and fast heart beat. Her symptoms are worse with exertion. She also endorses shortness of breath and dizziness. For years she has been monitoring her INR at home- last Coumadin was 7/9/23 at 2200.   Referred for Aflutter ablation today.     Cardiology Summary    Remote/Ambulatory Rhythm Monitoring: Zio XT from 8/3/2021-8/10/2021: min HR: 57, max HR: 164, mean HR: 78, no AF or AFl, 7 runs of non-sustained AT     Echo: TTE from 11/30/2022: EF: 65-70%, normal LV wall thickness, mechanical AVR and MVR, no significant AI, upper limited of RV size with normal RVSF, tricuspid valve with small echogenic focus on the anterior leaflet, stable from prior study, mild-mod TR, atrial septum is intact    FELISHA from 10/25/2021: mechanical MVR, 3 paravalvular jets of MR - severe MR, mechanical AVR - flow into pocket posterior the aortic valve, fistular seen from this posterior pocket into the LA, no LA or TEA thrombus, normal RA, normal RV size and function, TV annuloplasty, no pericardial effusion

## 2023-07-10 NOTE — H&P CARDIOLOGY - NSICDXPASTMEDICALHX_GEN_ALL_CORE_FT
PAST MEDICAL HISTORY:  Endocarditis     Rheumatic Disease of Heart Valve Age 16     PAST MEDICAL HISTORY:  Endocarditis     Rheumatic Disease of Heart Valve Age 16    Splenomegaly

## 2023-07-16 ENCOUNTER — TRANSCRIPTION ENCOUNTER (OUTPATIENT)
Age: 43
End: 2023-07-16

## 2023-08-16 PROBLEM — R16.1 SPLENOMEGALY, NOT ELSEWHERE CLASSIFIED: Chronic | Status: ACTIVE | Noted: 2023-07-10

## 2023-08-25 ENCOUNTER — APPOINTMENT (OUTPATIENT)
Dept: ELECTROPHYSIOLOGY | Facility: CLINIC | Age: 43
End: 2023-08-25
Payer: COMMERCIAL

## 2023-08-25 ENCOUNTER — NON-APPOINTMENT (OUTPATIENT)
Age: 43
End: 2023-08-25

## 2023-08-25 VITALS
HEART RATE: 86 BPM | OXYGEN SATURATION: 98 % | HEIGHT: 67 IN | BODY MASS INDEX: 24.64 KG/M2 | DIASTOLIC BLOOD PRESSURE: 71 MMHG | SYSTOLIC BLOOD PRESSURE: 105 MMHG | WEIGHT: 157 LBS

## 2023-08-25 PROCEDURE — 99213 OFFICE O/P EST LOW 20 MIN: CPT | Mod: 25

## 2023-08-25 PROCEDURE — 93000 ELECTROCARDIOGRAM COMPLETE: CPT

## 2023-08-25 NOTE — PHYSICAL EXAM
[Well Developed] : well developed [Well Nourished] : well nourished [No Acute Distress] : no acute distress [Normal Conjunctiva] : normal conjunctiva [Normal Venous Pressure] : normal venous pressure [No Murmur] : no murmur [No Rub] : no rub [No Gallop] : no gallop [Clear Lung Fields] : clear lung fields [Good Air Entry] : good air entry [Soft] : abdomen soft [Non Tender] : non-tender [Normal Gait] : normal gait [No Edema] : no edema [No Rash] : no rash [Moves all extremities] : moves all extremities [Normal Speech] : normal speech [Alert and Oriented] : alert and oriented [Normal memory] : normal memory [de-identified] : S1 and S2 with click

## 2023-08-25 NOTE — HISTORY OF PRESENT ILLNESS
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 aortic and mitral valve replacements in 1996 by Dr. Nirmala Adorno, a second surgery by Dr. Gary Valdes on 4/18/2018 that involved a re-operation on the aortic and mitral valves, a tricuspid and aortic root repair and a left atrial roof "enlargement" using a bovine pericardial patch. In February of 2022 she had a third operation by Dr. Radha Mejia from [facility] involving an aortic root replacement and a mechanical aortic valve along with a mechanical mitral valve replacement and reconstruction of the aortomitral curtain. She presents today for follow-up after undergoing an ablation on July 10th, 2023.  To briefly summarize her history, following her third open heart surgery in February of 2022 she had evidence of atrial fibrillation. Her atrial flutter was also diagnosed in February of 2022. A Zio XT monitor from January of 2023 demonstrated a 100% burden of atrial flutter. When she saw Dr. Martel in the office in June of 2023 she remained in atrial flutter. Her ablation on July 10th, 2023 included treatment of her clinical atrial flutter (clockwise, CTI-dependent atrial flutter) and pulmonary vein isolation.  Since the ablation the patient has been feeling well. She has noted transient "heart racing" that spontaneously resolves after approximately 30 seconds. This symptom has happened 2-3 times per week for the past two weeks. She also notes feeling her heart race after drinking a single glass of wine. She denies shortness of breath, groin pain, syncope, pre-syncope. She does endorse occasional dizziness. She states that this symptom does not correlate with her palpitations.

## 2023-08-25 NOTE — CARDIOLOGY SUMMARY
[de-identified] : ECG from 8/25/2023: Sinus rhythm at 86bpm, LVH ECG from 6/22/2023: Atrial flutter with ventricular rate of 71 beats per minute ECG from 6/2/2023: Atrial flutter with ventricular rate of 85, isoelectric segment in between the flutter waves, isoelectric flutter wave in V1, negative flutter waves in the inferior leads ECG from 10/16/2021: Sinus with ND: 226ms, normal axis, QRSd: 90ms [de-identified] : Jdo XT from 8/3/2021-8/10/2021: min HR: 57, max HR: 164, mean HR: 78, no AF or AFl, 7 runs of non-sustained AT [de-identified] : TTE from 11/30/2022: EF: 65-70%, normal LV wall thickness, mechanical AVR and MVR, no significant AI, upper limited of RV size with normal RVSF, tricuspid valve with small echogenic focus on the anterior leaflet, stable from prior study, mild-mod TR, atrial septum is intact\par  \par  FELISHA from 10/25/2021: mechanical MVR, 3 paravalvular jets of MR - severe MR, mechanical AVR - flow into pocket posterior the aortic valve, fistular seen from this posterior pocket into the LA, no LA or TEA thrombus, normal RA, normal RV size and function, TV annuloplasty, no pericardial effusion [de-identified] : Ablation 7/10/2023: clinical atrial flutter was clockwise, CTI dependent flutter, PVI also performed given history of AF (additional lesions required on anterior clare and complete carinal line on left PVs following circumferential ablation, additional ablation required on the posterior clare in order to isolate the right PVs following circumferential ablation), no acute or dormant PV reconnections, no inducible arrhythmias

## 2023-08-25 NOTE — DISCUSSION/SUMMARY
[EKG obtained to assist in diagnosis and management of assessed problem(s)] : EKG obtained to assist in diagnosis and management of assessed problem(s) [FreeTextEntry1] : Zoe French is a 43-year-old woman with atrial fibrillation, atrial flutter and rheumatic heart disease (severe mitral and moderate aortic regurgitation) with prior [facility] aortic and mitral valve replacements in 1996 by Dr. Nirmala Adorno, a second surgery by Dr. Gary Valdes on 4/18/2018 that involved a re-operation on the aortic and mitral valves, a tricuspid and aortic root repair and a left atrial roof "enlargement" using a bovine pericardial patch. In February of 2022 she had a third operation by Dr. Radha Mejia from [facility] involving an aortic root replacement and a mechanical aortic valve along with a mechanical mitral valve replacement and reconstruction of the aortomitral curtain. She presents today for follow-up after undergoing an ablation on July 10th, 2023. The etiology of her brief palpitations is unclear. I have a low clinical suspicion for a recurrence of atrial flutter. It is possible that she is having paroxysmal atrial fibrillation during the blanking period.  I suggested that we repeat a monitor (two-week Zio XT) at this time to elucidate the etiology of her symptoms. She will continue Coumadin. I will call her with the results of the monitor.

## 2023-08-25 NOTE — REASON FOR VISIT
[FreeTextEntry3] : Dr. Manoj Martel [FreeTextEntry1] : The patient is changing her Cardiologist to Dr. Jay Lisker to facilitate chronic Coumadin management

## 2023-09-01 ENCOUNTER — APPOINTMENT (OUTPATIENT)
Dept: CARDIOLOGY | Facility: CLINIC | Age: 43
End: 2023-09-01
Payer: COMMERCIAL

## 2023-09-01 VITALS — SYSTOLIC BLOOD PRESSURE: 122 MMHG | DIASTOLIC BLOOD PRESSURE: 72 MMHG

## 2023-09-01 VITALS
WEIGHT: 153 LBS | SYSTOLIC BLOOD PRESSURE: 100 MMHG | HEART RATE: 82 BPM | OXYGEN SATURATION: 100 % | DIASTOLIC BLOOD PRESSURE: 70 MMHG | BODY MASS INDEX: 23.96 KG/M2

## 2023-09-01 PROCEDURE — 99215 OFFICE O/P EST HI 40 MIN: CPT

## 2023-09-01 NOTE — DISCUSSION/SUMMARY
[FreeTextEntry1] : Zoe French is a 43 year old woman with possible rheumatic heart disease  Status post mechanical AVR (#21 [facility]) and MVR (#25/33 on-X)Who appears to be doing well overall. MVR/AVR: Mechanical valves at both sites require long-term oral anticoagulation with vitamin K agonist.WeeklyShe is monitoring her INR at home and we will facilitate this with a goal INR of 2.5-3.5. She has no definite indication for additional aspirin therapy and given her bleeding difficulties at home I have not suggested adding aspirin to her regimen. I have arranged for her to undergo annual echocardiogram with our congenital heart team.  Annual follow-up with me and them is appropriate. She will call if any new symptoms arise.

## 2023-09-01 NOTE — CARDIOLOGY SUMMARY
[de-identified] : ECG from 6/22/2023: Atrial flutter with ventricular rate of 71 beats per minute\par  ECG from 6/2/2023: Atrial flutter with ventricular rate of 85, isoelectric segment in between the flutter waves, isoelectric flutter wave in V1, negative flutter waves in the inferior leads\par  ECG from 10/16/2021: Sinus with ND: 226ms, normal axis, QRSd: 90ms [de-identified] : Jdo XT from 8/3/2021-8/10/2021: min HR: 57, max HR: 164, mean HR: 78, no AF or AFl, 7 runs of non-sustained AT [de-identified] : TTE from 11/30/2022: EF: 65-70%, normal LV wall thickness, mechanical AVR and MVR, no significant AI, upper limited of RV size with normal RVSF, tricuspid valve with small echogenic focus on the anterior leaflet, stable from prior study, mild-mod TR, atrial septum is intact\par  \par  FELISHA from 10/25/2021: mechanical MVR, 3 paravalvular jets of MR - severe MR, mechanical AVR - flow into pocket posterior the aortic valve, fistular seen from this posterior pocket into the LA, no LA or TEA thrombus, normal RA, normal RV size and function, TV annuloplasty, no pericardial effusion

## 2023-09-01 NOTE — HISTORY OF PRESENT ILLNESS
[FreeTextEntry1] : She sought cardiovascular management since her previous cardiologist was not able to manage her home INRs. 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 She denies any chest pains or Significant palpitations since her recent ablation, but does have occasional palpitations which is being assessed by a cardiac monitor currently. She reports no active bleeding issues but does note significant difficulty in wound healing time and bleeding discontinuation when she cuts herself in the kitchen. She has no orthopnea, PND or leg edema reported. Her most recent ECG showed normal sinus rhythm with LVH. She tends to avoid taking medications but has been consistent with her warfarin and test herself weekly with a goal INR of 2.5-3.5. She does not take aspirin therapy.  Prior echocardiogram done November 30, 2022 showed normal left ventricular systolic function with an estimated ejection fraction of 65 to 70%.She was in atrial flutter at the time so gradients are measured at a heart rate of 95 bpm.  Prior EP note was reviewed and is found below: Zoe French is a 43 year old woman with Possible rheumatic heart disease (severe mitral and moderate aortic regurgitation) with prior [facility] aortic and mitral valve replacements in 1996 by Dr. Nirmala Adorno, a second surgery by Dr. Gary Valdes on 4/18/2018 that involved a re-operation on the aortic and mitral valves, a tricuspid and aortic root repair and a left atrial roof "enlargement" using a bovine pericardial patch. In February of 2022 she had a third operation by Dr. Radha Mejia from [facility] involving an aortic root replacement and a mechanical aortic valve along with a mechanical mitral valve replacement and reconstruction of the aortomitral curtain. Her discharge summary from her last admission also documents "rapid atrial fibrillation." She was treated with amiodarone previously.  She presents today for an initial evaluation of an atrial flutter. The patient's atrial flutter was diagnosed following her third open heart operation in February of 2022. She brought a copy of a Zio XT monitor from January of 2023 that demonstrated a 100% burden of atrial flutter. Her heart rate ranged from 56 beats per minute to 151 beats per minute with an average heart rate of 88 beats per minute. She remained in atrial flutter at her office visit with Dr. Martel on June 2nd 2023. She reports that she has had palpitations since her most recent surgery and currently experiences palpitations on a daily basis. She describes her palpitations as a "rough" and fast heart beat. Her symptoms are worse with exertion. She also endorses shortness of breath and dizziness. For years she has been monitoring her INR at home. She reports that her INR has not been subtherapeutic recently.

## 2023-09-01 NOTE — PHYSICAL EXAM
[Well Developed] : well developed [Well Nourished] : well nourished [Normal Conjunctiva] : normal conjunctiva [Normal Venous Pressure] : normal venous pressure [Clear Lung Fields] : clear lung fields [Soft] : abdomen soft [Normal Gait] : normal gait [No Edema] : no edema [No Rash] : no rash [Moves all extremities] : moves all extremities [Alert and Oriented] : alert and oriented [de-identified] : Mid systolic and diastolic clicks are heard.  They sound crisp. [de-identified] : 1+ radial pulse on the right.  The remaining pulses are 2+.

## 2023-09-11 LAB
INR PPP: 3
PROTHROMBIN TIME COMMENT: NORMAL

## 2023-09-18 LAB
INR PPP: 3.1
PROTHROMBIN TIME COMMENT: NORMAL

## 2023-09-18 PROCEDURE — 93248 EXT ECG>7D<15D REV&INTERPJ: CPT

## 2023-09-26 LAB
INR PPP: 2.9
PROTHROMBIN TIME COMMENT: NORMAL

## 2023-10-10 LAB
INR PPP: 3.2
PROTHROMBIN TIME COMMENT: NORMAL

## 2023-10-13 LAB — INR PPP: 2.5

## 2023-10-21 LAB
INR PPP: 2.9
PROTHROMBIN TIME COMMENT: NORMAL

## 2023-10-26 LAB
INR PPP: 3.4
PROTHROMBIN TIME COMMENT: NORMAL

## 2023-11-08 LAB
INR PPP: 2.6
PROTHROMBIN TIME COMMENT: NORMAL

## 2023-11-17 LAB
INR PPP: 3.5
PROTHROMBIN TIME COMMENT: NORMAL

## 2023-11-22 LAB
INR PPP: 3.5
PROTHROMBIN TIME COMMENT: NORMAL

## 2023-11-29 ENCOUNTER — APPOINTMENT (OUTPATIENT)
Dept: PEDIATRIC CARDIOLOGY | Facility: CLINIC | Age: 43
End: 2023-11-29
Payer: COMMERCIAL

## 2023-11-29 VITALS
RESPIRATION RATE: 20 BRPM | HEART RATE: 81 BPM | SYSTOLIC BLOOD PRESSURE: 110 MMHG | OXYGEN SATURATION: 96 % | DIASTOLIC BLOOD PRESSURE: 71 MMHG

## 2023-11-29 VITALS — WEIGHT: 160.94 LBS | BODY MASS INDEX: 25.26 KG/M2 | HEIGHT: 66.93 IN

## 2023-11-29 LAB
INR PPP: 2.6
PROTHROMBIN TIME COMMENT: NORMAL

## 2023-11-29 PROCEDURE — 93306 TTE W/DOPPLER COMPLETE: CPT

## 2023-11-29 PROCEDURE — 99205 OFFICE O/P NEW HI 60 MIN: CPT | Mod: 25

## 2023-11-29 PROCEDURE — 93000 ELECTROCARDIOGRAM COMPLETE: CPT

## 2023-11-29 PROCEDURE — 99215 OFFICE O/P EST HI 40 MIN: CPT | Mod: 25

## 2023-12-06 LAB
INR PPP: 3
PROTHROMBIN TIME COMMENT: NORMAL

## 2023-12-14 LAB
INR PPP: 2.1
PROTHROMBIN TIME COMMENT: NORMAL

## 2024-01-02 LAB
INR PPP: 2.5
PROTHROMBIN TIME COMMENT: NORMAL

## 2024-01-15 LAB
INR PPP: 3.2
PROTHROMBIN TIME COMMENT: NORMAL

## 2024-01-22 LAB
INR PPP: 2.6
PROTHROMBIN TIME COMMENT: NORMAL

## 2024-01-31 ENCOUNTER — APPOINTMENT (OUTPATIENT)
Dept: THORACIC SURGERY | Facility: CLINIC | Age: 44
End: 2024-01-31
Payer: COMMERCIAL

## 2024-01-31 VITALS
DIASTOLIC BLOOD PRESSURE: 83 MMHG | SYSTOLIC BLOOD PRESSURE: 135 MMHG | OXYGEN SATURATION: 99 % | HEIGHT: 66 IN | WEIGHT: 155 LBS | HEART RATE: 86 BPM | BODY MASS INDEX: 24.91 KG/M2

## 2024-01-31 PROCEDURE — 99204 OFFICE O/P NEW MOD 45 MIN: CPT

## 2024-01-31 NOTE — DATA REVIEWED
[FreeTextEntry1] : I have independently reviewed the following: CTA chest on 12/20/2022 PET/CT on 12/20/2023

## 2024-01-31 NOTE — HISTORY OF PRESENT ILLNESS
[FreeTextEntry1] : Ms. 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 Radha Davis at [facility], pulmonary nodules f/u with Pulm Dr. Eric Hinson and Dr. Sb Toledo (thoracic surgeon) who recommended biopsy. Patient is self referred for second opinion.   CTA chest on 12/20/2022: (post aortic root and Mitral valve repair) comparison: CT chest on 11/30/2021 - the spiculated nodule abutting the left hemidiaphragm currently 1.3 cm and demonstrates pleural tag/spiculations. On previous examination it measured 1.0 cm on axial imaging, On coronal imaging this nodule measures at least 1.2 cm on 13: 88 and previously 1.0 cm. In addition, there are mildly increasing atelectatic changes int he left lung base when compared to the prior.  - mildly enlarged mediastinal LNs for example a 1.2 cm left paratracheal node on 6: 57, more conspicuous from the prior study  PET/CT on 12/20/2023: - there are 2 active left infrahilar LNs measuring up to SUV 5.9 - solid spiculate LLL active 19 mm nodule with SUV 13.5 (image 72)   Patient is here today for CT surgery consultation.  Admits to chronic SOB 2/2 heart status.

## 2024-01-31 NOTE — CONSULT LETTER
[Consult Letter:] : I had the pleasure of evaluating your patient, [unfilled]. [Please see my note below.] : Please see my note below. [Consult Closing:] : Thank you very much for allowing me to participate in the care of this patient.  If you have any questions, please do not hesitate to contact me. [Sincerely,] : Sincerely, [FreeTextEntry2] : self-referred [FreeTextEntry3] : Fortunato Valenzuela MD  Attending Surgeon  Division of Thoracic Surgery  , Cardiovascular and Thoracic Surgery  [facility] [employer] [facility]

## 2024-01-31 NOTE — ASSESSMENT
[FreeTextEntry1] : Ms. 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 Radha Davis at [facility], pulmonary nodules f/u with Pulm Dr. Eric Hinson and Dr. Sb Toledo (thoracic surgeon) who recommended biopsy. Patient is self referred for second opinion.   I have reviewed the patient's medical records and diagnostic images at time of this office consultation and have recommended a Navigational Bronch biopsy with Dr. Green.  Patient will need a high resolution CT Chest done prior to Navigational Bronch. Patient will RTC after biopsy.   I, JAZMYNE Turner, personally performed the evaluation and management (E/M) services for this established patient who presents today with (a) new problem(s)/exacerbation of (an) existing condition(s). That E/M includes conducting the examination, assessing all new/exacerbated conditions, and establishing a new plan of care. Today, my ACP, Freddy Richardson NP was here to observe my evaluation and management services for this new problem/exacerbated condition to be followed going forward.

## 2024-01-31 NOTE — PHYSICAL EXAM
[Fully active, able to carry on all pre-disease performance without restriction] : Status 0 - Fully active, able to carry on all pre-disease performance without restriction [General Appearance - Alert] : alert [General Appearance - In No Acute Distress] : in no acute distress [Sclera] : the sclera and conjunctiva were normal [PERRL With Normal Accommodation] : pupils were equal in size, round, and reactive to light [Extraocular Movements] : extraocular movements were intact [Outer Ear] : the ears and nose were normal in appearance [Oropharynx] : the oropharynx was normal [Neck Appearance] : the appearance of the neck was normal [Neck Cervical Mass (___cm)] : no neck mass was observed [Jugular Venous Distention Increased] : there was no jugular-venous distention [Thyroid Diffuse Enlargement] : the thyroid was not enlarged [Thyroid Nodule] : there were no palpable thyroid nodules [Auscultation Breath Sounds / Voice Sounds] : lungs were clear to auscultation bilaterally [Heart Rate And Rhythm] : heart rate was normal and rhythm regular [Heart Sounds] : normal S1 and S2 [Heart Sounds Gallop] : no gallops [Murmurs] : no murmurs [Heart Sounds Pericardial Friction Rub] : no pericardial rub [Examination Of The Chest] : the chest was normal in appearance [Chest Visual Inspection Thoracic Asymmetry] : no chest asymmetry [Diminished Respiratory Excursion] : normal chest expansion [2+] : left 2+ [Bowel Sounds] : normal bowel sounds [Abdomen Soft] : soft [Abdomen Tenderness] : non-tender [Abdomen Mass (___ Cm)] : no abdominal mass palpated [Cervical Lymph Nodes Enlarged Posterior Bilaterally] : posterior cervical [Supraclavicular Lymph Nodes Enlarged Bilaterally] : supraclavicular [Nail Clubbing] : no clubbing  or cyanosis of the fingernails [Abnormal Walk] : normal gait [Musculoskeletal - Swelling] : no joint swelling seen [Motor Tone] : muscle strength and tone were normal [Skin Color & Pigmentation] : normal skin color and pigmentation [Skin Turgor] : normal skin turgor [] : no rash [Oriented To Time, Place, And Person] : oriented to person, place, and time [Impaired Insight] : insight and judgment were intact [Affect] : the affect was normal

## 2024-02-05 LAB
INR PPP: 2.7
PROTHROMBIN TIME COMMENT: NORMAL

## 2024-02-12 ENCOUNTER — NON-APPOINTMENT (OUTPATIENT)
Age: 44
End: 2024-02-12

## 2024-02-12 LAB
INR PPP: 2.4
PROTHROMBIN TIME COMMENT: NORMAL

## 2024-02-13 ENCOUNTER — NON-APPOINTMENT (OUTPATIENT)
Age: 44
End: 2024-02-13

## 2024-02-18 ENCOUNTER — NON-APPOINTMENT (OUTPATIENT)
Age: 44
End: 2024-02-18

## 2024-02-20 LAB
INR PPP: 2.2
PROTHROMBIN TIME COMMENT: NORMAL

## 2024-02-26 LAB
INR PPP: 2.6
PROTHROMBIN TIME COMMENT: NORMAL

## 2024-03-04 ENCOUNTER — APPOINTMENT (OUTPATIENT)
Dept: PULMONOLOGY | Facility: CLINIC | Age: 44
End: 2024-03-04
Payer: COMMERCIAL

## 2024-03-04 VITALS
HEIGHT: 66 IN | DIASTOLIC BLOOD PRESSURE: 75 MMHG | RESPIRATION RATE: 16 BRPM | WEIGHT: 158 LBS | BODY MASS INDEX: 25.39 KG/M2 | OXYGEN SATURATION: 99 % | HEART RATE: 79 BPM | SYSTOLIC BLOOD PRESSURE: 121 MMHG

## 2024-03-04 DIAGNOSIS — R59.0 LOCALIZED ENLARGED LYMPH NODES: ICD-10-CM

## 2024-03-04 DIAGNOSIS — R06.02 SHORTNESS OF BREATH: ICD-10-CM

## 2024-03-04 DIAGNOSIS — R91.1 SOLITARY PULMONARY NODULE: ICD-10-CM

## 2024-03-04 DIAGNOSIS — Z01.818 ENCOUNTER FOR OTHER PREPROCEDURAL EXAMINATION: ICD-10-CM

## 2024-03-04 LAB
INR PPP: 2.8
PROTHROMBIN TIME COMMENT: NORMAL

## 2024-03-04 PROCEDURE — 99205 OFFICE O/P NEW HI 60 MIN: CPT | Mod: GC

## 2024-03-04 NOTE — PHYSICAL EXAM
[No Acute Distress] : no acute distress [Normal Oropharynx] : normal oropharynx [Normal Appearance] : normal appearance [No Neck Mass] : no neck mass [Normal Rate/Rhythm] : normal rate/rhythm [Normal S1, S2] : normal s1, s2 [No Murmurs] : no murmurs [No Resp Distress] : no resp distress [Clear to Auscultation Bilaterally] : clear to auscultation bilaterally [No Abnormalities] : no abnormalities [Normal Gait] : normal gait [Benign] : benign [No Cyanosis] : no cyanosis [No Clubbing] : no clubbing [No Edema] : no edema [FROM] : FROM [Normal Color/ Pigmentation] : normal color/ pigmentation [No Focal Deficits] : no focal deficits [Oriented x3] : oriented x3 [Normal Affect] : normal affect

## 2024-03-05 LAB
ALBUMIN SERPL ELPH-MCNC: 4.8 G/DL
ALP BLD-CCNC: 70 U/L
ALT SERPL-CCNC: 19 U/L
ANION GAP SERPL CALC-SCNC: 11 MMOL/L
AST SERPL-CCNC: 39 U/L
BASOPHILS # BLD AUTO: 0.05 K/UL
BASOPHILS NFR BLD AUTO: 1 %
BILIRUB SERPL-MCNC: 1.1 MG/DL
BUN SERPL-MCNC: 7 MG/DL
CALCIUM SERPL-MCNC: 9.6 MG/DL
CHLORIDE SERPL-SCNC: 100 MMOL/L
CO2 SERPL-SCNC: 25 MMOL/L
CREAT SERPL-MCNC: 0.47 MG/DL
EGFR: 121 ML/MIN/1.73M2
EOSINOPHIL # BLD AUTO: 0.5 K/UL
EOSINOPHIL NFR BLD AUTO: 10.1 %
GLUCOSE SERPL-MCNC: 92 MG/DL
HCT VFR BLD CALC: 34.1 %
HGB BLD-MCNC: 10.8 G/DL
IMM GRANULOCYTES NFR BLD AUTO: 0.2 %
LYMPHOCYTES # BLD AUTO: 1.53 K/UL
LYMPHOCYTES NFR BLD AUTO: 30.8 %
MAN DIFF?: NORMAL
MCHC RBC-ENTMCNC: 29.3 PG
MCHC RBC-ENTMCNC: 31.7 GM/DL
MCV RBC AUTO: 92.4 FL
MONOCYTES # BLD AUTO: 0.32 K/UL
MONOCYTES NFR BLD AUTO: 6.4 %
NEUTROPHILS # BLD AUTO: 2.56 K/UL
NEUTROPHILS NFR BLD AUTO: 51.5 %
PLATELET # BLD AUTO: 209 K/UL
POTASSIUM SERPL-SCNC: 4 MMOL/L
PROT SERPL-MCNC: 7.6 G/DL
RBC # BLD: 3.69 M/UL
RBC # FLD: 16.2 %
SODIUM SERPL-SCNC: 136 MMOL/L
WBC # FLD AUTO: 4.97 K/UL

## 2024-03-09 PROBLEM — Z01.818 PRE-OP TESTING: Status: ACTIVE | Noted: 2021-10-22

## 2024-03-09 PROBLEM — R59.0 MEDIASTINAL LYMPHADENOPATHY: Status: ACTIVE | Noted: 2024-01-29

## 2024-03-09 PROBLEM — R91.1 LUNG NODULE: Status: ACTIVE | Noted: 2024-01-29

## 2024-03-09 NOTE — END OF VISIT
[] : Fellow [FreeTextEntry3] : Plan discussed with referring thoracic surgeon (Dr. Valenzuela) as well as cardiologist (Dr. Lisker) including procedural plan, recommendations for managing ysabel-operative anticoagulation.  [Time Spent: ___ minutes] : I have spent [unfilled] minutes of time on the encounter. [>50% of the face to face encounter time was spent on counseling and/or coordination of care for ___] : Greater than 50% of the face to face encounter time was spent on counseling and/or coordination of care for [unfilled]

## 2024-03-09 NOTE — REASON FOR VISIT
[Initial] : an initial visit [Consultation] : a consultation [TextBox_44] : IP cx for lung biopsy [TextBox_13] : Dr. Valenzuela

## 2024-03-09 NOTE — ASSESSMENT
[FreeTextEntry1] : Ms. 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 Radha Davis at [facility], pulmonary nodules f/u with Pulm Dr. Eric Hinson and Dr. Sb Toledo (thoracic surgeon) who presents to the interventional pulmonology clinic to establish care.   #Spiculated LLL nodule #Hilar Lymphadenopathy - per PET/CT on 12/20/2023 - will upload most recent CT image and plan for bronchoscopy with biopsy - details of bronchoscopy with biopsy as well as risks/benefits were explained to patient, who voiced understanding and was amenable to proceed with scheduling - will clarify with cardiology pre-operative anticoagulation regimen given patient's cardiac history and need for anticoagulation - As per discussion with Dr. Jay Lisker, he discussed the risks and benefits of IV lovenox vs IV Heparin + Hospitalization pre op given mechanical valves. As per their team, patient will be transitioned to lovenox and then hold the am dose of lovenox on the day of the procedure and she will be scheduled for afternoon procedure. - We discussed the risks and benefits of doing the procedure with holding 12 hours of lovenox. Increased risk of intraoperative bleeding including requiring further surgical interventions, ICU stay and associated morbidity and mortality was discussed. We also discussed that if AC is held for prolonged period, there is increased risk of thrombosis including but not limited to stroke and associated morbidity and mortality.  - The anticoagulation will then be restarted post op based on bleeding noted during bronchoscopy either the same night or the next day.  The diagnostic yield of robotic assisted navigation assisted bronchoscopy with biopsy as well as EBUS with biopsy was discussed with the patient. The risk and benefits of bronchoscopy with navigation assisted transbronchial biopsy including risk of bleeding and  risk pneumothorax was discussed with the patient and they demonstrated understanding. In case of pneumothorax, we discussed the need for in hospital monitoring and chest tube placement. In case of bleeding, we explained that they may require inpatient or ICU admission. In case the lesion is suspicious for malignancy on preliminary or there is mediastinal or hilar adenopathy, the patient will need staging of the mediastinum with EBUS guided TBNA in the same procedure. The risk and benefits of EBUS including the risk of bleeding and risk of pneumothorax associated with EBUS was discussed with the patient and he demonstrated understanding. We will also send the tissue/BAL for culture to assess for infectious etiology during the procedure. The patient is agreeable to proceed with a navigation assisted bronchoscopy with biopsy of the lung lesion and EBUS with TBNA. The patient will undergo PST to assess candidacy of general anesthesia as well as discuss the risks and benefits with the anesthesiologist. Educational reading material regarding the procedure, risks and benefits provided to the patient in paper format.     Will need presurgical testing prior to scheduling the procedure. The office will co-ordinate testing and pre-surgical appointment. Will need medical clearance. Will need cardiac clearance and inputs and definitive instructions from cardiology for management of anticoaugulation in the ysabel-operative period. Planned for flexible bronchoscopy, robotic assisted navigation bronchoscopy with biopsy of the lung lesion/rEBUS and evaluation of the hilum and mediastinum using linear EBUS on 3/19/24.    -

## 2024-03-09 NOTE — HISTORY OF PRESENT ILLNESS
[Never] : never [TextBox_4] : Interventional Pulmonology Clinic Note:   Ms. 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 Radha Davis at [facility], pulmonary nodules f/u with Pulm Dr. Eric Hinson and Dr. Sb Toledo (thoracic surgeon) who presents to the interventional pulmonology clinic to establish care.  On evaluation she states that she feels well.  She has never smoked.  Her mother has a diagnosis of sarcoidosis from biopsy.  She otherwise has no personal or family history of lung disease or lung cancer  Prior imaging studies:  CTA chest on 12/20/2022: (post aortic root and Mitral valve repair) comparison: CT chest on 11/30/2021 - the spiculated nodule abutting the left hemidiaphragm currently 1.3 cm and demonstrates pleural tag/spiculations. On previous examination it measured 1.0 cm on axial imaging, On coronal imaging this nodule measures at least 1.2 cm on 13: 88 and previously 1.0 cm. In addition, there are mildly increasing atelectatic changes int he left lung base when compared to the prior. - mildly enlarged mediastinal LNs for example a 1.2 cm left paratracheal node on 6: 57, more conspicuous from the prior study  PET/CT on 12/20/2023: - there are 2 active left infrahilar LNs measuring up to SUV 5.9 - solid spiculate LLL active 19 mm nodule with SUV 13.5 (image 72)

## 2024-03-09 NOTE — CONSULT LETTER
[Dear  ___] : Dear  [unfilled], [Consult Letter:] : I had the pleasure of evaluating your patient, [unfilled]. [Sincerely,] : Sincerely, [Please see my note below.] : Please see my note below. [Consult Closing:] : Thank you very much for allowing me to participate in the care of this patient.  If you have any questions, please do not hesitate to contact me. [FreeTextEntry3] : Laron Green MD Director of Interventional Pulmonology & Bronchoscopy .  Division of Pulmonary, Critical Care & Sleep Medicine [facility].

## 2024-03-10 PROBLEM — I48.4 ATYPICAL ATRIAL FLUTTER: Status: ACTIVE | Noted: 2023-06-22

## 2024-03-10 NOTE — CARDIOLOGY SUMMARY
[de-identified] : Zio XT from 8/25/2023-9/8/2023: min HR: 60, max HR: 115, mean HR: 81, the lone patient triggered events was for sinus rhythm. No AF or AFl   Zio XT from 8/3/2021-8/10/2021: min HR: 57, max HR: 164, mean HR: 78, no AF or AFl, 7 runs of non-sustained AT [de-identified] : ECG from 3/14/2024:  ECG from 8/25/2023: Sinus rhythm at 86bpm, LVH ECG from 6/22/2023: Atrial flutter with ventricular rate of 71 beats per minute ECG from 6/2/2023: Atrial flutter with ventricular rate of 85, isoelectric segment in between the flutter waves, isoelectric flutter wave in V1, negative flutter waves in the inferior leads ECG from 10/16/2021: Sinus with KY: 226ms, normal axis, QRSd: 90ms [de-identified] : Ablation 7/10/2023: clinical atrial flutter was clockwise, CTI dependent flutter, PVI also performed given history of AF (additional lesions required on anterior clare and complete carinal line on left PVs following circumferential ablation, additional ablation required on the posterior clare in order to isolate the right PVs following circumferential ablation), no acute or dormant PV reconnections, no inducible arrhythmias [de-identified] : TTE from 11/30/2022: EF: 65-70%, normal LV wall thickness, mechanical AVR and MVR, no significant AI, upper limited of RV size with normal RVSF, tricuspid valve with small echogenic focus on the anterior leaflet, stable from prior study, mild-mod TR, atrial septum is intact\par  \par  FELISHA from 10/25/2021: mechanical MVR, 3 paravalvular jets of MR - severe MR, mechanical AVR - flow into pocket posterior the aortic valve, fistular seen from this posterior pocket into the LA, no LA or TEA thrombus, normal RA, normal RV size and function, TV annuloplasty, no pericardial effusion

## 2024-03-10 NOTE — HISTORY OF PRESENT ILLNESS
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 aortic and mitral valve replacements in 1996 by Dr. Nirmala Adorno, a second surgery by Dr. Gary Valdes on 4/18/2018 that involved a re-operation on the aortic and mitral valves, a tricuspid and aortic root repair and a left atrial roof "enlargement" using a bovine pericardial patch. In February of 2022 she had a third operation by Dr. Radha Mejia from [facility] involving an aortic root replacement and a mechanical aortic valve along with a mechanical mitral valve replacement and reconstruction of the aortomitral curtain. She presents today for follow-up after undergoing an ablation on July 10th, 2023.  To briefly summarize her history, following her third open heart surgery in February of 2022 she had evidence of atrial fibrillation. Her atrial flutter was also diagnosed in February of 2022. A Zio XT monitor from January of 2023 demonstrated a 100% burden of atrial flutter. When she saw Dr. Martel in the office in June of 2023 she remained in atrial flutter. Her ablation on July 10th, 2023 included treatment of her clinical atrial flutter (clockwise, CTI-dependent atrial flutter) and pulmonary vein isolation.   Since our last visit in August of 2023, she has been feeling ____. She is scheduled to undergo a bronchoscopy with biopsy of a lung lesion and evaluation of the hilum and mediastinum using endobronchial ultrasound on March 19th due to the findings of two active left hilar lymph nodes and a solid spicular left lower lobe 19mm nodule on a PET scan from December 20th, 2023.

## 2024-03-11 ENCOUNTER — NON-APPOINTMENT (OUTPATIENT)
Age: 44
End: 2024-03-11

## 2024-03-11 LAB
INR PPP: 2.9
PROTHROMBIN TIME COMMENT: NORMAL

## 2024-03-13 ENCOUNTER — APPOINTMENT (OUTPATIENT)
Dept: CARDIOLOGY | Facility: CLINIC | Age: 44
End: 2024-03-13
Payer: COMMERCIAL

## 2024-03-13 ENCOUNTER — NON-APPOINTMENT (OUTPATIENT)
Age: 44
End: 2024-03-13

## 2024-03-13 VITALS
BODY MASS INDEX: 25.02 KG/M2 | WEIGHT: 155 LBS | DIASTOLIC BLOOD PRESSURE: 66 MMHG | SYSTOLIC BLOOD PRESSURE: 112 MMHG | HEART RATE: 82 BPM | OXYGEN SATURATION: 100 %

## 2024-03-13 DIAGNOSIS — Z95.2 PRESENCE OF PROSTHETIC HEART VALVE: ICD-10-CM

## 2024-03-13 DIAGNOSIS — I48.0 PAROXYSMAL ATRIAL FIBRILLATION: ICD-10-CM

## 2024-03-13 DIAGNOSIS — Z79.01 LONG TERM (CURRENT) USE OF ANTICOAGULANTS: ICD-10-CM

## 2024-03-13 PROCEDURE — 93000 ELECTROCARDIOGRAM COMPLETE: CPT | Mod: NC

## 2024-03-13 PROCEDURE — 99214 OFFICE O/P EST MOD 30 MIN: CPT | Mod: 25

## 2024-03-14 ENCOUNTER — APPOINTMENT (OUTPATIENT)
Dept: ELECTROPHYSIOLOGY | Facility: CLINIC | Age: 44
End: 2024-03-14

## 2024-03-14 DIAGNOSIS — R00.2 PALPITATIONS: ICD-10-CM

## 2024-03-14 DIAGNOSIS — I48.4 ATYPICAL ATRIAL FLUTTER: ICD-10-CM

## 2024-03-19 ENCOUNTER — RESULT REVIEW (OUTPATIENT)
Age: 44
End: 2024-03-19

## 2024-03-19 ENCOUNTER — APPOINTMENT (OUTPATIENT)
Dept: PULMONOLOGY | Facility: HOSPITAL | Age: 44
End: 2024-03-19

## 2024-03-19 ENCOUNTER — TRANSCRIPTION ENCOUNTER (OUTPATIENT)
Age: 44
End: 2024-03-19

## 2024-03-19 ENCOUNTER — OUTPATIENT (OUTPATIENT)
Dept: OUTPATIENT SERVICES | Facility: HOSPITAL | Age: 44
LOS: 1 days | Discharge: HOME | End: 2024-03-19
Payer: COMMERCIAL

## 2024-03-19 VITALS
RESPIRATION RATE: 16 BRPM | SYSTOLIC BLOOD PRESSURE: 114 MMHG | HEART RATE: 78 BPM | WEIGHT: 156.09 LBS | OXYGEN SATURATION: 100 % | DIASTOLIC BLOOD PRESSURE: 55 MMHG | HEIGHT: 66 IN | TEMPERATURE: 98 F

## 2024-03-19 VITALS
RESPIRATION RATE: 16 BRPM | HEART RATE: 77 BPM | DIASTOLIC BLOOD PRESSURE: 69 MMHG | SYSTOLIC BLOOD PRESSURE: 119 MMHG | OXYGEN SATURATION: 97 %

## 2024-03-19 DIAGNOSIS — Z95.2 PRESENCE OF PROSTHETIC HEART VALVE: Chronic | ICD-10-CM

## 2024-03-19 DIAGNOSIS — R91.1 SOLITARY PULMONARY NODULE: ICD-10-CM

## 2024-03-19 LAB — HCG UR QL: NEGATIVE — SIGNIFICANT CHANGE UP

## 2024-03-19 PROCEDURE — 88341 IMHCHEM/IMCYTCHM EA ADD ANTB: CPT | Mod: 26

## 2024-03-19 PROCEDURE — 88173 CYTOPATH EVAL FNA REPORT: CPT | Mod: 26

## 2024-03-19 PROCEDURE — 31627 NAVIGATIONAL BRONCHOSCOPY: CPT | Mod: GC

## 2024-03-19 PROCEDURE — 31653 BRONCH EBUS SAMPLNG 3/> NODE: CPT | Mod: GC

## 2024-03-19 PROCEDURE — 88342 IMHCHEM/IMCYTCHM 1ST ANTB: CPT | Mod: 26

## 2024-03-19 PROCEDURE — 31629 BRONCHOSCOPY/NEEDLE BX EACH: CPT | Mod: GC

## 2024-03-19 PROCEDURE — 88112 CYTOPATH CELL ENHANCE TECH: CPT | Mod: 26,59

## 2024-03-19 PROCEDURE — 31654 BRONCH EBUS IVNTJ PERPH LES: CPT | Mod: GC

## 2024-03-19 PROCEDURE — 31624 DX BRONCHOSCOPE/LAVAGE: CPT | Mod: GC

## 2024-03-19 PROCEDURE — 31628 BRONCHOSCOPY/LUNG BX EACH: CPT | Mod: GC

## 2024-03-19 PROCEDURE — 88305 TISSUE EXAM BY PATHOLOGIST: CPT | Mod: 26

## 2024-03-19 PROCEDURE — 88360 TUMOR IMMUNOHISTOCHEM/MANUAL: CPT | Mod: 26,59

## 2024-03-19 PROCEDURE — 88172 CYTP DX EVAL FNA 1ST EA SITE: CPT | Mod: 26

## 2024-03-19 PROCEDURE — 71045 X-RAY EXAM CHEST 1 VIEW: CPT | Mod: 26

## 2024-03-19 PROCEDURE — 31645 BRNCHSC W/THER ASPIR 1ST: CPT | Mod: GC

## 2024-03-19 DEVICE — KIT A-LINE 1LUM 20G X 12CM SAFE KIT: Type: IMPLANTABLE DEVICE | Status: FUNCTIONAL

## 2024-03-19 DEVICE — SYS GALAXY BRONCHOSCOPE SINGLE USE: Type: IMPLANTABLE DEVICE | Status: FUNCTIONAL

## 2024-03-19 RX ORDER — ONDANSETRON 8 MG/1
4 TABLET, FILM COATED ORAL ONCE
Refills: 0 | Status: COMPLETED | OUTPATIENT
Start: 2024-03-19 | End: 2024-03-19

## 2024-03-19 RX ORDER — SODIUM CHLORIDE 9 MG/ML
1000 INJECTION, SOLUTION INTRAVENOUS
Refills: 0 | Status: DISCONTINUED | OUTPATIENT
Start: 2024-03-19 | End: 2024-04-02

## 2024-03-19 RX ADMIN — ONDANSETRON 4 MILLIGRAM(S): 8 TABLET, FILM COATED ORAL at 16:40

## 2024-03-19 NOTE — CARDIOLOGY SUMMARY
[de-identified] : ECG from 6/22/2023: Atrial flutter with ventricular rate of 71 beats per minute\par  ECG from 6/2/2023: Atrial flutter with ventricular rate of 85, isoelectric segment in between the flutter waves, isoelectric flutter wave in V1, negative flutter waves in the inferior leads\par  ECG from 10/16/2021: Sinus with MN: 226ms, normal axis, QRSd: 90ms [de-identified] : Jdo XT from 8/3/2021-8/10/2021: min HR: 57, max HR: 164, mean HR: 78, no AF or AFl, 7 runs of non-sustained AT [de-identified] : TTE from 11/30/2022: EF: 65-70%, normal LV wall thickness, mechanical AVR and MVR, no significant AI, upper limited of RV size with normal RVSF, tricuspid valve with small echogenic focus on the anterior leaflet, stable from prior study, mild-mod TR, atrial septum is intact\par  \par  FELISHA from 10/25/2021: mechanical MVR, 3 paravalvular jets of MR - severe MR, mechanical AVR - flow into pocket posterior the aortic valve, fistular seen from this posterior pocket into the LA, no LA or TEA thrombus, normal RA, normal RV size and function, TV annuloplasty, no pericardial effusion

## 2024-03-19 NOTE — PHYSICAL EXAM
[Well Developed] : well developed [Well Nourished] : well nourished [Normal Conjunctiva] : normal conjunctiva [Normal Venous Pressure] : normal venous pressure [Soft] : abdomen soft [Clear Lung Fields] : clear lung fields [No Edema] : no edema [Normal Gait] : normal gait [No Rash] : no rash [Alert and Oriented] : alert and oriented [Moves all extremities] : moves all extremities [de-identified] : 1+ radial pulse on the right.  The remaining pulses are 2+. [de-identified] : Mid systolic and diastolic clicks are heard.  They sound crisp.

## 2024-03-19 NOTE — ASU DISCHARGE PLAN (ADULT/PEDIATRIC) - NS MD DC FALL RISK RISK
For information on Fall & Injury Prevention, visit: [email or web address] OR  [email or web address] OR  https://www.cdc.gov/steadi/patient.html

## 2024-03-19 NOTE — ASU DISCHARGE PLAN (ADULT/PEDIATRIC) - CARE PROVIDER_API CALL
Laron Green  Pulmonary Disease  [address] [place] [id number]  Phone: [phone]  Fax: [phone]  Follow Up Time: 1 month

## 2024-03-19 NOTE — ASU PREOP CHECKLIST - NS PREOP CHK INSULIN PUMP THERAPY
Parents educated on safe sleep environment for . Verbalized understanding. Do parents have a safe sleep environment:YES    Parents request a Baby Box:YES      If Baby Box requested must complete and check all below:       [] Nurse reviewed certifcate from videos. [] Baby Box given to parents. [] Education completed on use of Baby Box. [] Release Form Signed.      [] Copy of Release Form put in mother's chart     [] Mom sticker and email address put on clipboard none

## 2024-03-19 NOTE — PHYSICAL EXAM
[Well Developed] : well developed [Well Nourished] : well nourished [Normal Conjunctiva] : normal conjunctiva [Normal Venous Pressure] : normal venous pressure [Soft] : abdomen soft [Clear Lung Fields] : clear lung fields [Normal Gait] : normal gait [No Edema] : no edema [No Rash] : no rash [Moves all extremities] : moves all extremities [Alert and Oriented] : alert and oriented [de-identified] : Mid systolic and diastolic clicks are heard.  They sound crisp. [de-identified] : 1+ radial pulse on the right.  The remaining pulses are 2+.

## 2024-03-19 NOTE — HISTORY OF PRESENT ILLNESS
[FreeTextEntry1] : Zoe French returns today for preoperative cardiovascular evaluation prior to planned bronchoscopy with Laron Green MD on 3/19/2024.  As she returns today she is feeling well and she is seen in her usual state of health. Her most recent INR was 2.8 on Thursday of last week. She is planning to transition of enoxaparin in anticipation of upcoming procedure. Her last dose of warfarin will be this evening.    Prior: She sought cardiovascular management since her previous cardiologist was not able to manage her home INRs. 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 She denies any chest pains or significant palpitations since her recent ablation, but does have occasional palpitations which is being assessed by a cardiac monitor currently. She reports no active bleeding issues but does note significant difficulty in wound healing time and bleeding discontinuation when she cuts herself in the kitchen. She has no orthopnea, PND or leg edema reported. Her most recent ECG showed normal sinus rhythm with LVH. She tends to avoid taking medications but has been consistent with her warfarin and test herself weekly with a goal INR of 2.5-3.5. She does not take aspirin therapy.  Prior echocardiogram done November 30, 2022 showed normal left ventricular systolic function with an estimated ejection fraction of 65 to 70%.She was in atrial flutter at the time so gradients are measured at a heart rate of 95 bpm.  Prior EP note was reviewed and is found below: Zoe French is a 43-year-old woman with Possible rheumatic heart disease (severe mitral and moderate aortic regurgitation) with prior [facility] aortic and mitral valve replacements in 1996 by Dr. Nirmala Adorno, a second surgery by Dr. Gary Valdes on 4/18/2018 that involved a re-operation on the aortic and mitral valves, a tricuspid and aortic root repair and a left atrial roof "enlargement" using a bovine pericardial patch. In February of 2022 she had a third operation by Dr. Radha Mejia from [facility] involving an aortic root replacement and a mechanical aortic valve along with a mechanical mitral valve replacement and reconstruction of the aortomitral curtain. Her discharge summary from her last admission also documents "rapid atrial fibrillation." She was treated with amiodarone previously.  She presents today for an initial evaluation of an atrial flutter. The patient's atrial flutter was diagnosed following her third open heart operation in February of 2022. She brought a copy of a Zio XT monitor from January of 2023 that demonstrated a 100% burden of atrial flutter. Her heart rate ranged from 56 beats per minute to 151 beats per minute with an average heart rate of 88 beats per minute. She remained in atrial flutter at her office visit with Dr. Martel on June 2nd 2023. She reports that she has had palpitations since her most recent surgery and currently experiences palpitations on a daily basis. She describes her palpitations as a "rough" and fast heart beat. Her symptoms are worse with exertion. She also endorses shortness of breath and dizziness. For years she has been monitoring her INR at home. She reports that her INR has not been subtherapeutic recently.

## 2024-03-19 NOTE — H&P PST ADULT - NSICDXPASTMEDICALHX_GEN_ALL_CORE_FT
PAST MEDICAL HISTORY:  Endocarditis     Rheumatic Disease of Heart Valve Age 16    Splenomegaly

## 2024-03-19 NOTE — ASU PREOP CHECKLIST - HEIGHT IN CM
Patient : Shivani Godinez Age: 21 year old Sex: female   MRN: [id number] Encounter Date: 7/14/2019    CC1/C1    History     Chief Complaint   Patient presents with   • Sunburn   • Leg Pain     HPI   7/14/2019  4:35 PM Shivani Godinez is a 21 year old female who presents to the ED for evaluation of leg pain due to sunburn rated as a 5/10 while sitting and a 10/10  when walking that [place] 1 [place]. She has tried Aloe with lidocaine but symptoms have not improved. There is a past medical history significant for asthma.There are no further complaints or modifying factors at this time.         PCP: Osvaldo Alarcon MD        Allergies   Allergen Reactions   • Sulfa Antibiotics HIVES and ANAPHYLAXIS   • Sulfamethoxazole-Trimethoprim RASH   • Cat Dander Other (See Comments)     Runny nose, red eyes and sneezing        Current Discharge Medication List      Prior to Admission Medications    Details   Prenatal Vit-Fe Fumarate-FA (PRENATAL VITAMIN PO)       albuterol (PROAIR HFA) 108 (90 Base) MCG/ACT inhaler Inhale 2 puffs into the lungs every 4 hours as needed for Shortness of Breath or Wheezing.  Qty: 1 Inhaler, Refills: 2                 Past Medical History:   Diagnosis Date   • ADHD (attention deficit hyperactivity disorder)    • Anxiety    • Ovarian cyst 07/2018    rupture   • RAD (reactive airway disease) Feb 2013    uses rescue inhaler few times a year       Past Surgical History:   Procedure Laterality Date   • HERNIA REPAIR  5 yrs old    right inguinal       Family History   Problem Relation Age of Onset   • Substance abuse Mother    • Psychiatric Mother    • Cancer, Ovarian Maternal Grandmother    • Cancer Paternal Grandmother         ovarian       Social History     Tobacco Use   • Smoking status: Never Smoker   • Smokeless tobacco: Never Used   • Tobacco comment: smokes marijuana, uses nicotine wraps    Substance Use Topics   • Alcohol use: Not Currently     Comment: occasionally    • Drug  use: Not Currently     Types: Marijuana     Comment: last used today       Review of Systems   Constitutional: Negative for chills, fatigue and fever.   HENT: Negative for congestion, ear pain, postnasal drip, rhinorrhea, sneezing and sore throat.    Respiratory: Negative for cough and shortness of breath.    Gastrointestinal: Negative for nausea and vomiting.   Genitourinary: Negative for dysuria and frequency.   Musculoskeletal: Negative for arthralgias, back pain, neck pain and neck stiffness.        BLE pain   Skin: Negative for rash.        positive for sunburn on BLE   Neurological: Negative for light-headedness, numbness and headaches.       Physical Exam     ED Triage Vitals [07/14/19 1635]   ED Triage Vitals Group      Temp 99 °F (37.2 °C)      Pulse 107      Resp 17      /78      SpO2 99 %      EtCO2 mmHg       Height 5' 4\" (1.626 m)      Weight 183 lb 3.2 oz (83.1 kg)      Weight Scale Used ED Actual       Physical Exam   Constitutional: She is oriented to person, place, and time. She appears well-developed and well-nourished.  Non-toxic appearance.   HENT:   Head: Atraumatic.   Mouth/Throat: No oropharyngeal exudate.   Eyes: Pupils are equal, round, and reactive to light. Conjunctivae and EOM are normal. No scleral icterus.   Neck: Normal range of motion. Neck supple.   Cardiovascular: Normal rate.   Pulmonary/Chest: Effort normal and breath sounds normal. No respiratory distress.   Abdominal: Soft. Bowel sounds are normal.   Musculoskeletal: Normal range of motion. She exhibits no edema.   Neurological: She is alert and oriented to person, place, and time.   Skin: Skin is warm and dry. Burn (superficial burn to anterior BLE, BUE, chest, and forehead) noted. There is erythema.   No areas of blistering    Psychiatric: She has a normal mood and affect. Her behavior is normal. Thought content normal.   Nursing note and vitals reviewed.         ED Course     Procedures    Lab Results     No results  found for this visit on 07/14/19.    EKG Results         Radiology Results     Imaging Results    None         ED Medication Orders (From admission, onward)    None               MDM  Vitals  Vitals:    07/14/19 1635   BP: 129/78   Pulse: 107   Resp: 17   Temp: 99 °F (37.2 °C)   TempSrc: Oral   SpO2: 99%   Weight: 83.1 kg   Height: 5' 4\" (1.626 m)       ED Course    4:35 PM I checked the patient who presents with BLE sunburn. We discussed no further evaluation is needed. I will prescribe Aloe Vera 2% lidocaine.  I advised the patient to follow up with their PCP and return to the ER with any new or worsening symptoms. The patient understands and agrees with the plan. Any questions were answered.      [facility]  Critical Care time spent on this patient outside of billable procedures:  None    Clinical Impression  ED Diagnosis        Final diagnosis    Sunburn- anterior legs and upper arms, no blistering                The patient was provided with a recommendation to follow up with a primary care provider and obtain reassessment of his/her blood pressure within three months.    Follow Up:  Osvaldo Alarcon MD  [address] [place] [address]  [phone]      As needed    [facility]  [address] [place] [address]  [phone]              Summary of your Discharge Medications      Take these Medications      Details   Lidocaine-Collagen-Aloe Vera 2 % Gel   Apply to affected areas BID-TID            Pt is discharged to home/self care in stable condition.         I have reviewed the information recorded by the scribe for accuracy and agree with its contents.    ____________________________________________________________________    Kendell Carias acting as a scribe for Jerrod Bolanos PA-C.    Jerrod Bolanos PA-C  Dictation # [id number]  Scribe: Kendell Carias      Attending Physician: Dr. Ashanti Lauren   Dictation # [id number]       Jerrod Bolanos PA-C  07/14/19 2018     167.64

## 2024-03-19 NOTE — DISCUSSION/SUMMARY
[EKG obtained to assist in diagnosis and management of assessed problem(s)] : EKG obtained to assist in diagnosis and management of assessed problem(s) [FreeTextEntry1] : Zoe French is a 43-year-old woman with possible rheumatic heart disease  Status post mechanical AVR (#21 [place]) and MVR (#25/33 on-X)Who appears to be doing well overall. MVR/AVR: Mechanical valves at both sites require long-term oral anticoagulation with vitamin K agonist. Weekly she is monitoring her INR at home and we will facilitate this with a goal INR of 2.5-3.5. She has no definite indication for additional aspirin therapy and given her bleeding difficulties at home I have not suggested adding aspirin to her regimen. I have arranged for her to undergo annual echocardiogram with our congenital heart team.   Annual follow-up with me and them is appropriate.  I reviewed with her, her prior bridging procedures with Lovenox and discussed the risks and benefits of Lovenox bridging vs. IV heparin and hospitalization. We agreed that the best option for her would be to bridge her anticoagulation with Lovenox 1mg/kg/q12 hours when her INR is below 2.5. She will discontinue coumadin 1 week prior to the planned procedure and check her INR daily. When INR is below 2.5 she will being Lovenox injections 70 mg every 12 hours including the night prior to the planned procedure. No Lovenox injection on the morning of the procedure. Will restart coumadin after the procedure and restart Lovenox that night or the next morning based upon the bleeding seen during bronchoscopic biopsy. Plan reviewed with Dr. Trinh who agrees that this approach is okay as well.   She will call if any new symptoms arise.

## 2024-03-19 NOTE — HISTORY OF PRESENT ILLNESS
[FreeTextEntry1] : Zoe French returns today for preoperative cardiovascular evaluation prior to planned bronchoscopy with Laron Green MD on 3/19/2024.  As she returns today she is feeling well and she is seen in her usual state of health. Her most recent INR was 2.8 on Thursday of last week. She is planning to transition of enoxaparin in anticipation of upcoming procedure. Her last dose of warfarin will be this evening.    Prior: She sought cardiovascular management since her previous cardiologist was not able to manage her home INRs. 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 She denies any chest pains or significant palpitations since her recent ablation, but does have occasional palpitations which is being assessed by a cardiac monitor currently. She reports no active bleeding issues but does note significant difficulty in wound healing time and bleeding discontinuation when she cuts herself in the kitchen. She has no orthopnea, PND or leg edema reported. Her most recent ECG showed normal sinus rhythm with LVH. She tends to avoid taking medications but has been consistent with her warfarin and test herself weekly with a goal INR of 2.5-3.5. She does not take aspirin therapy.  Prior echocardiogram done November 30, 2022 showed normal left ventricular systolic function with an estimated ejection fraction of 65 to 70%.She was in atrial flutter at the time so gradients are measured at a heart rate of 95 bpm.  Prior EP note was reviewed and is found below: Zoe French is a 43-year-old woman with Possible rheumatic heart disease (severe mitral and moderate aortic regurgitation) with prior [facility] aortic and mitral valve replacements in 1996 by Dr. Nirmala Adorno, a second surgery by Dr. Gary Valdes on 4/18/2018 that involved a re-operation on the aortic and mitral valves, a tricuspid and aortic root repair and a left atrial roof "enlargement" using a bovine pericardial patch. In February of 2022 she had a third operation by Dr. Radha Mejia from [facility] involving an aortic root replacement and a mechanical aortic valve along with a mechanical mitral valve replacement and reconstruction of the aortomitral curtain. Her discharge summary from her last admission also documents "rapid atrial fibrillation." She was treated with amiodarone previously.  She presents today for an initial evaluation of an atrial flutter. The patient's atrial flutter was diagnosed following her third open heart operation in February of 2022. She brought a copy of a Zio XT monitor from January of 2023 that demonstrated a 100% burden of atrial flutter. Her heart rate ranged from 56 beats per minute to 151 beats per minute with an average heart rate of 88 beats per minute. She remained in atrial flutter at her office visit with Dr. Martel on June 2nd 2023. She reports that she has had palpitations since her most recent surgery and currently experiences palpitations on a daily basis. She describes her palpitations as a "rough" and fast heart beat. Her symptoms are worse with exertion. She also endorses shortness of breath and dizziness. For years she has been monitoring her INR at home. She reports that her INR has not been subtherapeutic recently.

## 2024-03-19 NOTE — H&P PST ADULT - HISTORY OF PRESENT ILLNESS
43 year old woman, recently found to have splenomegaly, with rheumatic heart disease (severe mitral and moderate aortic regurgitation) with prior [place] aortic and mitral valve replacements in 1996 by Dr. Nirmala Adorno, a second surgery by Dr. Gary Valdes on 4/18/2018 that involved a re-operation on the aortic and mitral valves, a tricuspid and aortic root repair and a left atrial roof "enlargement" using a bovine pericardial patch. In February of 2022 she had a third operation by Dr. Radha Mejia from formerly [facility] involving an aortic root replacement and a mechanical aortic valve along with a mechanical mitral valve replacement and reconstruction of the aortomitral curtain. Her discharge summary from her last admission also documents "rapid atrial fibrillation." She was treated with amiodarone previously.    She was noted to have a left lower lobe nodule and referred to Interventional Pulmonary for a robotic assisted left lower lobe nodule biopsy.

## 2024-03-19 NOTE — H&P PST ADULT - ATTENDING COMMENTS
Patient here for flexible bronchoscopy, robotic assisted navigation bronchoscopy, lung biopsy, ebus and lymph node biopsy. Risks including but not limited to pneumothorax and bleeding discussed, and possible interventions. Agreeable to proceed. Higher risk of bleeding was discussed given she cannot stop ac for a long time, including but not limited to catastrophic bleeding, ICU stay and associated morbidity and mortality. Risk of stopping ac was also discussed with patient and cardiologist who both understand and have cleared her for the same

## 2024-03-19 NOTE — H&P PST ADULT - ASSESSMENT
Patient here for robotic assisted navigation bronchoscopy with left lower lobe nodule biopsy. She stopped coumadin 5 days ago and has last dose of lovenox was last night.

## 2024-03-19 NOTE — ASU DISCHARGE PLAN (ADULT/PEDIATRIC) - ASU DC SPECIAL INSTRUCTIONSFT
You may restart your coumadin tonight.    Patients may experience fever on the night of the procedure. Patient's may cough up specks of blood for 1-2 days after the procedure. If the bleeding is large or persistent please contact your pulmonologist. If your fever is persistent, please contact your pulmonologist. In case of sudden chest pain or trouble breathing, please go to your nearest emergency room and contact your pulmonologist.

## 2024-03-19 NOTE — CARDIOLOGY SUMMARY
[de-identified] : ECG from 6/22/2023: Atrial flutter with ventricular rate of 71 beats per minute\par  ECG from 6/2/2023: Atrial flutter with ventricular rate of 85, isoelectric segment in between the flutter waves, isoelectric flutter wave in V1, negative flutter waves in the inferior leads\par  ECG from 10/16/2021: Sinus with IL: 226ms, normal axis, QRSd: 90ms [de-identified] : Jdo XT from 8/3/2021-8/10/2021: min HR: 57, max HR: 164, mean HR: 78, no AF or AFl, 7 runs of non-sustained AT [de-identified] : TTE from 11/30/2022: EF: 65-70%, normal LV wall thickness, mechanical AVR and MVR, no significant AI, upper limited of RV size with normal RVSF, tricuspid valve with small echogenic focus on the anterior leaflet, stable from prior study, mild-mod TR, atrial septum is intact\par  \par  FELISHA from 10/25/2021: mechanical MVR, 3 paravalvular jets of MR - severe MR, mechanical AVR - flow into pocket posterior the aortic valve, fistular seen from this posterior pocket into the LA, no LA or TEA thrombus, normal RA, normal RV size and function, TV annuloplasty, no pericardial effusion

## 2024-03-20 ENCOUNTER — NON-APPOINTMENT (OUTPATIENT)
Age: 44
End: 2024-03-20

## 2024-03-20 LAB
GRAM STN FLD: SIGNIFICANT CHANGE UP
NIGHT BLUE STAIN TISS: SIGNIFICANT CHANGE UP
SPECIMEN SOURCE: SIGNIFICANT CHANGE UP
SPECIMEN SOURCE: SIGNIFICANT CHANGE UP

## 2024-03-21 LAB
CULTURE RESULTS: NO GROWTH — SIGNIFICANT CHANGE UP
INR PPP: 1.2
NON-GYNECOLOGICAL CYTOLOGY STUDY: SIGNIFICANT CHANGE UP
PROTHROMBIN TIME COMMENT: NORMAL
SPECIMEN SOURCE: SIGNIFICANT CHANGE UP

## 2024-03-21 RX ORDER — ENOXAPARIN SODIUM 80 MG/.8ML
80 INJECTION, SOLUTION SUBCUTANEOUS
Qty: 1 | Refills: 0 | Status: ACTIVE | COMMUNITY
Start: 2024-02-13 | End: 1900-01-01

## 2024-03-22 LAB
INR PPP: 2.1
PROTHROMBIN TIME COMMENT: NORMAL

## 2024-03-24 ENCOUNTER — RESULT REVIEW (OUTPATIENT)
Age: 44
End: 2024-03-24

## 2024-03-24 ENCOUNTER — INPATIENT (INPATIENT)
Facility: HOSPITAL | Age: 44
LOS: 2 days | Discharge: HOME | End: 2024-03-27
Attending: INTERNAL MEDICINE | Admitting: HOSPITALIST
Payer: COMMERCIAL

## 2024-03-24 VITALS
SYSTOLIC BLOOD PRESSURE: 131 MMHG | OXYGEN SATURATION: 100 % | DIASTOLIC BLOOD PRESSURE: 80 MMHG | TEMPERATURE: 98 F | HEIGHT: 66 IN | HEART RATE: 85 BPM | RESPIRATION RATE: 18 BRPM

## 2024-03-24 DIAGNOSIS — Z95.2 PRESENCE OF PROSTHETIC HEART VALVE: Chronic | ICD-10-CM

## 2024-03-24 DIAGNOSIS — R04.2 HEMOPTYSIS: ICD-10-CM

## 2024-03-24 LAB
ALBUMIN SERPL ELPH-MCNC: 4.3 G/DL — SIGNIFICANT CHANGE UP (ref 3.3–5)
ALP SERPL-CCNC: 59 U/L — SIGNIFICANT CHANGE UP (ref 40–120)
ALT FLD-CCNC: 109 U/L — HIGH (ref 4–33)
ANION GAP SERPL CALC-SCNC: 12 MMOL/L — SIGNIFICANT CHANGE UP (ref 7–14)
APTT BLD: 40.8 SEC — HIGH (ref 24.5–35.6)
APTT BLD: 90.5 SEC — HIGH (ref 24.5–35.6)
AST SERPL-CCNC: 72 U/L — HIGH (ref 4–32)
BASOPHILS # BLD AUTO: 0.04 K/UL — SIGNIFICANT CHANGE UP (ref 0–0.2)
BASOPHILS NFR BLD AUTO: 0.8 % — SIGNIFICANT CHANGE UP (ref 0–2)
BILIRUB SERPL-MCNC: 0.8 MG/DL — SIGNIFICANT CHANGE UP (ref 0.2–1.2)
BLD GP AB SCN SERPL QL: NEGATIVE — SIGNIFICANT CHANGE UP
BUN SERPL-MCNC: 10 MG/DL — SIGNIFICANT CHANGE UP (ref 7–23)
CALCIUM SERPL-MCNC: 9.4 MG/DL — SIGNIFICANT CHANGE UP (ref 8.4–10.5)
CHLORIDE SERPL-SCNC: 104 MMOL/L — SIGNIFICANT CHANGE UP (ref 98–107)
CO2 SERPL-SCNC: 23 MMOL/L — SIGNIFICANT CHANGE UP (ref 22–31)
CREAT SERPL-MCNC: 0.43 MG/DL — LOW (ref 0.5–1.3)
EGFR: 124 ML/MIN/1.73M2 — SIGNIFICANT CHANGE UP
EOSINOPHIL # BLD AUTO: 0.37 K/UL — SIGNIFICANT CHANGE UP (ref 0–0.5)
EOSINOPHIL NFR BLD AUTO: 7.8 % — HIGH (ref 0–6)
GLUCOSE SERPL-MCNC: 108 MG/DL — HIGH (ref 70–99)
HCG SERPL-ACNC: <1 MIU/ML — SIGNIFICANT CHANGE UP
HCT VFR BLD CALC: 28 % — LOW (ref 34.5–45)
HCT VFR BLD CALC: 29.1 % — LOW (ref 34.5–45)
HCT VFR BLD CALC: 32.8 % — LOW (ref 34.5–45)
HGB BLD-MCNC: 10.8 G/DL — LOW (ref 11.5–15.5)
HGB BLD-MCNC: 9.1 G/DL — LOW (ref 11.5–15.5)
HGB BLD-MCNC: 9.6 G/DL — LOW (ref 11.5–15.5)
IANC: 2.81 K/UL — SIGNIFICANT CHANGE UP (ref 1.8–7.4)
IMM GRANULOCYTES NFR BLD AUTO: 0.4 % — SIGNIFICANT CHANGE UP (ref 0–0.9)
INR BLD: 1.51 RATIO — HIGH (ref 0.85–1.18)
LYMPHOCYTES # BLD AUTO: 1.08 K/UL — SIGNIFICANT CHANGE UP (ref 1–3.3)
LYMPHOCYTES # BLD AUTO: 22.9 % — SIGNIFICANT CHANGE UP (ref 13–44)
MCHC RBC-ENTMCNC: 29.9 PG — SIGNIFICANT CHANGE UP (ref 27–34)
MCHC RBC-ENTMCNC: 30.1 PG — SIGNIFICANT CHANGE UP (ref 27–34)
MCHC RBC-ENTMCNC: 30.5 PG — SIGNIFICANT CHANGE UP (ref 27–34)
MCHC RBC-ENTMCNC: 32.5 GM/DL — SIGNIFICANT CHANGE UP (ref 32–36)
MCHC RBC-ENTMCNC: 32.9 GM/DL — SIGNIFICANT CHANGE UP (ref 32–36)
MCHC RBC-ENTMCNC: 33 GM/DL — SIGNIFICANT CHANGE UP (ref 32–36)
MCV RBC AUTO: 91.4 FL — SIGNIFICANT CHANGE UP (ref 80–100)
MCV RBC AUTO: 92.1 FL — SIGNIFICANT CHANGE UP (ref 80–100)
MCV RBC AUTO: 92.4 FL — SIGNIFICANT CHANGE UP (ref 80–100)
MONOCYTES # BLD AUTO: 0.4 K/UL — SIGNIFICANT CHANGE UP (ref 0–0.9)
MONOCYTES NFR BLD AUTO: 8.5 % — SIGNIFICANT CHANGE UP (ref 2–14)
NEUTROPHILS # BLD AUTO: 2.81 K/UL — SIGNIFICANT CHANGE UP (ref 1.8–7.4)
NEUTROPHILS NFR BLD AUTO: 59.6 % — SIGNIFICANT CHANGE UP (ref 43–77)
NRBC # BLD: 0 /100 WBCS — SIGNIFICANT CHANGE UP (ref 0–0)
NRBC # FLD: 0 K/UL — SIGNIFICANT CHANGE UP (ref 0–0)
PLATELET # BLD AUTO: 161 K/UL — SIGNIFICANT CHANGE UP (ref 150–400)
PLATELET # BLD AUTO: 169 K/UL — SIGNIFICANT CHANGE UP (ref 150–400)
PLATELET # BLD AUTO: 214 K/UL — SIGNIFICANT CHANGE UP (ref 150–400)
POTASSIUM SERPL-MCNC: 4 MMOL/L — SIGNIFICANT CHANGE UP (ref 3.5–5.3)
POTASSIUM SERPL-SCNC: 4 MMOL/L — SIGNIFICANT CHANGE UP (ref 3.5–5.3)
PROT SERPL-MCNC: 7.1 G/DL — SIGNIFICANT CHANGE UP (ref 6–8.3)
PROTHROM AB SERPL-ACNC: 16.7 SEC — HIGH (ref 9.5–13)
RBC # BLD: 3.04 M/UL — LOW (ref 3.8–5.2)
RBC # BLD: 3.15 M/UL — LOW (ref 3.8–5.2)
RBC # BLD: 3.59 M/UL — LOW (ref 3.8–5.2)
RBC # FLD: 15.9 % — HIGH (ref 10.3–14.5)
RBC # FLD: 15.9 % — HIGH (ref 10.3–14.5)
RBC # FLD: 16 % — HIGH (ref 10.3–14.5)
RH IG SCN BLD-IMP: POSITIVE — SIGNIFICANT CHANGE UP
SODIUM SERPL-SCNC: 139 MMOL/L — SIGNIFICANT CHANGE UP (ref 135–145)
WBC # BLD: 4.67 K/UL — SIGNIFICANT CHANGE UP (ref 3.8–10.5)
WBC # BLD: 4.72 K/UL — SIGNIFICANT CHANGE UP (ref 3.8–10.5)
WBC # BLD: 5.17 K/UL — SIGNIFICANT CHANGE UP (ref 3.8–10.5)
WBC # FLD AUTO: 4.67 K/UL — SIGNIFICANT CHANGE UP (ref 3.8–10.5)
WBC # FLD AUTO: 4.72 K/UL — SIGNIFICANT CHANGE UP (ref 3.8–10.5)
WBC # FLD AUTO: 5.17 K/UL — SIGNIFICANT CHANGE UP (ref 3.8–10.5)

## 2024-03-24 PROCEDURE — 99285 EMERGENCY DEPT VISIT HI MDM: CPT

## 2024-03-24 PROCEDURE — 93306 TTE W/DOPPLER COMPLETE: CPT | Mod: 26

## 2024-03-24 PROCEDURE — 71046 X-RAY EXAM CHEST 2 VIEWS: CPT | Mod: 26

## 2024-03-24 PROCEDURE — 99291 CRITICAL CARE FIRST HOUR: CPT | Mod: GC

## 2024-03-24 PROCEDURE — 99223 1ST HOSP IP/OBS HIGH 75: CPT

## 2024-03-24 PROCEDURE — 71275 CT ANGIOGRAPHY CHEST: CPT | Mod: 26,MC

## 2024-03-24 RX ORDER — HEPARIN SODIUM 5000 [USP'U]/ML
3000 INJECTION INTRAVENOUS; SUBCUTANEOUS EVERY 6 HOURS
Refills: 0 | Status: DISCONTINUED | OUTPATIENT
Start: 2024-03-24 | End: 2024-03-24

## 2024-03-24 RX ORDER — WARFARIN SODIUM 2.5 MG/1
12.5 TABLET ORAL
Refills: 0 | DISCHARGE

## 2024-03-24 RX ORDER — CEFTRIAXONE 500 MG/1
1000 INJECTION, POWDER, FOR SOLUTION INTRAMUSCULAR; INTRAVENOUS EVERY 24 HOURS
Refills: 0 | Status: DISCONTINUED | OUTPATIENT
Start: 2024-03-24 | End: 2024-03-26

## 2024-03-24 RX ORDER — HEPARIN SODIUM 5000 [USP'U]/ML
6000 INJECTION INTRAVENOUS; SUBCUTANEOUS EVERY 6 HOURS
Refills: 0 | Status: DISCONTINUED | OUTPATIENT
Start: 2024-03-24 | End: 2024-03-24

## 2024-03-24 RX ORDER — HEPARIN SODIUM 5000 [USP'U]/ML
INJECTION INTRAVENOUS; SUBCUTANEOUS
Qty: 25000 | Refills: 0 | Status: DISCONTINUED | OUTPATIENT
Start: 2024-03-24 | End: 2024-03-25

## 2024-03-24 RX ADMIN — HEPARIN SODIUM 1300 UNIT(S)/HR: 5000 INJECTION INTRAVENOUS; SUBCUTANEOUS at 21:12

## 2024-03-24 RX ADMIN — CEFTRIAXONE 100 MILLIGRAM(S): 500 INJECTION, POWDER, FOR SOLUTION INTRAMUSCULAR; INTRAVENOUS at 16:15

## 2024-03-24 RX ADMIN — HEPARIN SODIUM 1300 UNIT(S)/HR: 5000 INJECTION INTRAVENOUS; SUBCUTANEOUS at 13:29

## 2024-03-24 NOTE — ED ADULT NURSE REASSESSMENT NOTE - NS ED NURSE REASSESS COMMENT FT1
Pt report given to MICU RN , pt to go to RM 8, pt in no acute distress pending transportation to unit in[facility]

## 2024-03-24 NOTE — ED ADULT NURSE REASSESSMENT NOTE - NS ED NURSE REASSESS COMMENT FT1
Pt report received, pt presents in no acute distress denies any discomfort at this time - pending pulmonology consult in[facility]

## 2024-03-24 NOTE — H&P ADULT - ASSESSMENT
Patient is a 44yo woman with a medical history of rheumatic heart disease (s/p aortic-mitral valve replacements malignancy via bronschopy (3/18) , atrial flutter s/p CTI ablation 7/2023 who presented to the ED with hemoptysis and now admitted to the MICU with left sided segmental PE with no hemodynamic compromise pending heparin drip administration.  =====Neurologic=====  Patient is AOx4  Mentating well with no concerns at the moment     =====Cardiovascular=====  #Pulmonary Embolism     #Mechanical Valves     =====Pulmonary=====  #Pulmonary Embolism     =====GI=====  No present GI concerns, tolerating a regular kosher Diet     =====Renal/=====  No present Renal/ concerns     =====Infectious Disease=====  #StrepPyogenesProphylaxis    =====Endocrine=====  No concerns presently.     =====Heme/Onc=====  - DVT PPX: Heparin drip for full AC      =====MICUGeneral=====  Lines:  Left wrist peripheral  Drips:  Heparin  IVF : NA  O2:  Satting well on room air  AB: NA  Diet: Regular Kosher     Pain:   DVT: Heparin Drip, holding patient's home warfarin          =====Neurologic=====  Patient is AOx4  Mentating well with no concerns at the moment     =====Cardiovascular=====  #Pulmonary Embolism   PE on CT scan in lieu of hemoptysis, cardiology following. No signs of R heart strain on CT, need to rule out with TTE. PESI score of 73: class two low risk.   - Start heparin drip w/out bolus  - TTE to definitely rule out right heart strain  - Appreciate cardiology/pulm and other consultant     #Mechanical Valves   Hx of severe mitral and moderate aortic regurgitation) with prior [place] aortic and mitral valve replacements in 1996 w/ multiple revisions. Takes warfarin 12.5 daily iso of their atrial flutter.   - FU TTE and other cardo recs as above    =====Pulmonary=====  #PulmonaryMalignancy  Patient received bronchoscopy by Dr. Green on 3/18, Sample taken from lower left lobe and lymphadenopathy. Patient results came back for malignant cells in all region. CT 3/24 also noted some bone changes. potentially pointing towards metastatic disease.   - Onc consult after resolution of acute concerns  - FU pulm recs.     =====GI=====  No present GI concerns, tolerating a regular kosher Diet     =====Renal/=====  No present Renal/ concerns     =====Infectious Disease=====  #StrepPyogenesProphylaxis  Patient's children tested positive for strep pyogens. Due to patient's history of rheumatic disease she was placed on prophylactic amoxicillin Recent labs came back for negative strep.   Plan  - CTM     =====Endocrine=====  No concerns presently.     =====Heme/Onc=====  - DVT PPX: Heparin drip for full AC      =====MICUGeneral=====  Lines:  Left wrist peripheral  Drips:  Heparin  IVF : NA  O2:  Satting well on room air  AB: NA  Diet: Regular Kosher     Pain:   DVT: Heparin Drip, holding patient's home warfarin          =====Neurologic=====  Patient is AOx4  Mentating well with no concerns at the moment     =====Cardiovascular=====  #Pulmonary Embolism   PE on CT scan in lieu of hemoptysis, cardiology following. No signs of R heart strain on CT, need to rule out with TTE. PESI score of 73: class two low risk.   - Start heparin drip w/out bolus  - TTE to definitely rule out right heart strain  - FU venous ultrasound lower extremity   - Appreciate cardiology/pulm and other consultant     #Mechanical Valves   Hx of severe mitral and moderate aortic regurgitation) with prior [place] aortic and mitral valve replacements in 1996 w/ multiple revisions. Takes warfarin 12.5 daily iso of their atrial flutter.   - FU TTE and other cardio recs as above    =====Pulmonary=====  #PulmonaryMalignancy  Patient received bronchoscopy by Dr. Green on 3/18, Sample taken from lower left lobe and lymphadenopathy. Patient results came back for malignant cells in all region. CT 3/24 also noted some bone changes. potentially pointing towards metastatic disease.   - Onc consult after resolution of acute concerns  - FU pulm recs.     =====GI=====  No present GI concerns, tolerating a regular kosher Diet     =====Renal/=====  No present Renal/ concerns     =====Infectious Disease=====  #StrepPyogenesProphylaxis  Patient's children tested positive for strep pyogens. Due to patient's history of rheumatic disease she was placed on prophylactic amoxicillin Recent labs came back for negative strep.   Plan  - CTM     =====Endocrine=====  No concerns presently.     =====Heme/Onc=====  - DVT PPX: Heparin drip for full AC      =====MICUGeneral=====  Lines:  Left wrist peripheral  Drips:  Heparin  IVF : NA  O2:  Satting well on room air  AB: NA  Diet: Regular Kosher     Pain: NA  DVT: Heparin Drip, holding patient's home warfarin          Patient is a 44yo woman with a medical history of rheumatic heart disease (s/p aortic-mitral valve replacements, pulm nodule s/p bronschopy (3/18), atrial flutter s/p CTI ablation 7/2023 who presented to the ED with hemoptysis and now admitted to the MICU with left sided segmental PE and now on heparin drip.     =====Neurologic=====  Patient is AOx4  Mentating well with no concerns at the moment     =====Cardiovascular=====  #Pulmonary Embolism   PE on CT scan in lieu of hemoptysis, cardiology following. No signs of R heart strain on CT, need to rule out with TTE. PESI score of 73: class two low risk.   - Start heparin drip w/out bolus  - TTE to definitely rule out right heart strain  - FU venous ultrasound lower extremity   - Appreciate cardiology/pulm and other consultant     #Mechanical Valves   Hx of severe mitral and moderate aortic regurgitation) with prior [place] aortic and mitral valve replacements in 1996 w/ multiple revisions. Takes warfarin 12.5 daily iso of their atrial flutter.   - FU TTE and other cardio recs as above    =====Pulmonary=====  #PulmonaryMalignancy  Patient received bronchoscopy by Dr. Green on 3/18, Sample taken from lower left lobe and lymphadenopathy. Patient results came back for malignant cells in all region. CT 3/24 also noted some bone changes. potentially pointing towards metastatic disease.   - Onc consult after resolution of acute concerns  - FU pulm recs.     =====GI=====  No present GI concerns, tolerating a regular kosher Diet     =====Renal/=====  No present Renal/ concerns     =====Infectious Disease=====  #StrepPyogenesProphylaxis  Patient's children tested positive for strep pyogens. Due to patient's history of rheumatic disease she was placed on prophylactic amoxicillin Recent labs came back for negative strep.   Plan  - CTM     =====Endocrine=====  No concerns presently.     =====Heme/Onc=====  - DVT PPX: Heparin drip for full AC      =====MICUGeneral=====  Lines:  Left wrist peripheral  Drips:  Heparin  IVF : NA  O2:  Satting well on room air  AB: NA  Diet: Regular Kosher     Pain: NA  DVT: Heparin Drip, holding patient's home warfarin

## 2024-03-24 NOTE — PATIENT PROFILE ADULT - CONTRAINDICATIONS & PRECAUTIONS (SELECT ALL THAT APPLY)
Moderate to severe acute illness with or without fever. Delay administration of vaccination until patient has been afebrile or illness resolved for 24hrs
labs done

## 2024-03-24 NOTE — H&P ADULT - NSHPREVIEWOFSYSTEMS_GEN_ALL_CORE
REVIEW OF SYSTEMS:  CONSTITUTIONAL:  No weakness, fevers   EYES/ENT:  No visual changes; no facial pain, no hearing difficulties   NECK/SPINE:  No pain or stiffness  RESPIRATORY:  +hemoptysis +cough, no shortness of breath   CARDIOVASCULAR:  No chest pain or palpitations  GASTROINTESTINAL:  Tolerates food well. No abdominal or epigastric pain. No nausea/vomiting ; No diarrhea/constipation. No melena/hematochezia.  GENITOURINARY:  No dysuria, frequency or hematuria  MUSCULOSKELETAL: No concerns with ambulation, No calf tenderness, no calf swelling   NEUROLOGICAL:  No numbness/weakness  PSYCH: Mood is appropriate, patient just found out she has malignancy   SKIN:  No itching, rashes

## 2024-03-24 NOTE — CONSULT NOTE ADULT - ASSESSMENT
42YO Female never smokerPMH rheumatic heart disease (s/p mechanical aortic and mitral valve on warfarin), AFlutter s/p CTI ablation 7/2023 and LLL spiculated nodule with hilar lymphadenopathy s/p bx of LLL nodule, 4R, 4L and 11L nodules who was admitted 3/24 for hemoptysis. Found to have CT chest notable for subsegemtnal  PE and consolidative mass encasing adjacent vessels and left lower lobe bronchus, concerning for neoplasm or metastatic disease. Interventional pulmonology consulted for hemoptysis.     #Hemoptysis  - Given need for AC in the setting of mechanical valves, had extensive discussion with pt and  regarding bronchoscopy to localize bleeding and potentially intervene. Explained risk of potentially life threatening bleeding when on anticoagulation and possible requirement for emergent intubation and bronchoscopy. She and her  acknowledged risk however opted for monitoring for bleeding while on anticoagulation  - Would start heparin gtt without bolus and monitor in MICU for further bleeding  - Hold warfarin for now and if no bleeding in 24hrs can restart bridge  - Please keep cup at bedside to monitor  - If pt requires emergent intubation would intubate with 8.0 tube and call interventional pulmonology  - Agree with starting ceftriaxone for now while waiting for strep swab to result  - F/u cytopath for primary diagnosis for malignancy (informed pt of preliminary findings)    Case discussed with Dr. Green 44YO Female never smokerPMH rheumatic heart disease (s/p mechanical aortic and mitral valve on warfarin), AFlutter s/p CTI ablation 7/2023 and LLL spiculated nodule with hilar lymphadenopathy s/p bx of LLL nodule, 4R, 4L and 11L nodules who was admitted 3/24 for hemoptysis. Found to have CT chest notable for subsegemtnal  PE and consolidative mass encasing adjacent vessels and left lower lobe bronchus, concerning for neoplasm or metastatic disease. Interventional pulmonology consulted for hemoptysis.     #Hemoptysis  - Given need for AC in the setting of mechanical valves, had extensive discussion with pt and  regarding bronchoscopy to localize bleeding and potentially intervene. Explained risk of potentially life threatening bleeding when on anticoagulation and possible requirement for emergent intubation and bronchoscopy. She and her  acknowledged risk however opted for monitoring for bleeding while on anticoagulation  - Would start heparin gtt without bolus and monitor in MICU for further bleeding  - Hold warfarin for now and if no bleeding in 24hrs can restart bridge  - Please keep cup at bedside to monitor  - If pt requires emergent intubation would intubate with 8.0 tube and call interventional pulmonology  - Selective intubation of right side if significant bleeding  - Agree with starting ceftriaxone for now while waiting for strep swab to result  - F/u cytopath for primary diagnosis for malignancy (informed pt of preliminary findings)    Case discussed with Dr. Green

## 2024-03-24 NOTE — ED PROVIDER NOTE - CLINICAL SUMMARY MEDICAL DECISION MAKING FREE TEXT BOX
Patient is a 43 year old woman w/ hx of rheumatic heart disease (severe mitral and moderate aortic regurgitation) with prior [facility] aortic and mitral valve replacements in 1996 w/ multiple revisions, hx of c-sections x3 c/b post-partum bleeding, s/p bronchoscopy w/ Dr. Green on 3/18 for pulm nodule Presents with hemoptysis.  Began night of bronchoscopy however progressively worsening over  last 3 days.  No hematemesis, bleeding from any other sources.  Patient held her warfarin 5 days prior to procedure and restarted the night of.  She is also on Lovenox to bridge and discontinued it 3 nights ago due to concern of hemoptysis.  Denies lightheadedness, dizziness, fever, chills, chest pain, shortness of breath, nausea, vomiting, urinary or bowel changes.  Called the pulmonologist office and was referred to the ED.  Last INR  subtherapeutic.   Of note, pathology reports in [facility] concerning for malignant cells found on bronchoscopy.  Unclear if patient was aware of this.  When I spoke to her she says that she was not aware of results but later said that she was told that she had atypical cells.   No travel, edema, sick contacts.  No hormone use.    CONSTITUTIONAL: No fevers, no chills, no lightheadedness, no dizziness  EYES: no visual changes, no eye pain  EARS: no ear drainage, no ear pain, no change in hearing  NOSE: no nasal congestion  MOUTH/THROAT: no sore throat  CV: No chest pain, no palpitations  RESP: see hpi   GI: No n/v/d, no abd pain  : no dysuria, no hematuria, no flank pain  MSK: no back pain, no extremity pain  SKIN: no rashes  NEURO: no headache, no focal weakness, no decreased sensation/parasthesias   PSYCHIATRIC: no known mental health issues     Vitals: Normotensive, HR 80s, afebrile, sat well on RA     General: Alert and Orientated x 3. No apparent distress.  Head: Normocephalic and atraumatic.  Eyes: PERRLA with EOMI.  Neck: Supple. Trachea midline.   Cardiac: Normal S1 and S2 w/ RRR. No murmurs appreciated. No JVD appreciated.  Pulmonary: CTA bilaterally. No increased WOB. No wheezes or crackles.  Abdominal: Soft, non-tender. (+) bowel sounds appreciated in all 4 quadrants. No hepatosplenomegaly.   Neurologic: No focal sensory or motor deficits.  Musculoskeletal: Strength appropriate in all 4 extremities for age with no limited ROM.  Skin: Color appropriate for race. Intact, warm, and well-perfused.  Psychiatric: Appropriate mood and affect. No apparent risk to self or others.    MDM: 3 year old woman w/ hx of rheumatic heart disease (severe mitral and moderate aortic regurgitation) with prior [facility] aortic and mitral valve replacements in 1996 w/ multiple revisions, hx of c-sections x3 c/b post-partum bleeding, s/p bronchoscopy w/ Dr. Green on 3/18 for pulm nodule Presents with hemoptysis.   On warfarin, discontinued Lovenox on her own 3 days ago.   Patient has several incidences of postsurgical complications with bleeding.  Given this concern that there may be bleeding at proximal site.  However with these new malignant cells concern is also for PE   In the setting of cancer.  Will get CTA   chest, chest x-ray, labs including coags to evaluate INR.  Will reach out to Dr. Garcia's team.  Dispo pending labs and imaging. Patient is a 43 year old woman w/ hx of rheumatic heart disease (severe mitral and moderate aortic regurgitation) with prior [facility] aortic and mitral valve replacements in 1996 w/ multiple revisions, hx of c-sections x3 c/b post-partum bleeding, s/p bronchoscopy w/ Dr. Green on 3/18 for pulm nodule Presents with hemoptysis.  Began night of bronchoscopy however progressively worsening over  last 3 days.  No hematemesis, bleeding from any other sources.  Patient held her warfarin 5 days prior to procedure and restarted the night of.  She is also on Lovenox to bridge and discontinued it 3 nights ago due to concern of hemoptysis.  Denies lightheadedness, dizziness, fever, chills, chest pain, shortness of breath, nausea, vomiting, urinary or bowel changes.  Called the pulmonologist office and was referred to the ED.  Last INR  subtherapeutic.   Of note, pathology reports in [facility] concerning for malignant cells found on bronchoscopy.  Unclear if patient was aware of this.  When I spoke to her she says that she was not aware of results but later said that she was told that she had atypical cells.   No travel, edema, sick contacts.  No hormone use.    CONSTITUTIONAL: No fevers, no chills, no lightheadedness, no dizziness  EYES: no visual changes, no eye pain  EARS: no ear drainage, no ear pain, no change in hearing  NOSE: no nasal congestion  MOUTH/THROAT: no sore throat  CV: No chest pain, no palpitations  RESP: see hpi   GI: No n/v/d, no abd pain  : no dysuria, no hematuria, no flank pain  MSK: no back pain, no extremity pain  SKIN: no rashes  NEURO: no headache, no focal weakness, no decreased sensation/parasthesias   PSYCHIATRIC: no known mental health issues     Vitals: Normotensive, HR 80s, afebrile, sat well on RA     General: Alert and Orientated x 3. No apparent distress.  Head: Normocephalic and atraumatic.  Eyes: PERRLA with EOMI.  Neck: Supple. Trachea midline.   Cardiac: Normal S1 and S2 w/ RRR. No murmurs appreciated. No JVD appreciated.  Pulmonary: CTA bilaterally. No increased WOB. No wheezes or crackles.  Abdominal: Soft, non-tender. (+) bowel sounds appreciated in all 4 quadrants. No hepatosplenomegaly.   Neurologic: No focal sensory or motor deficits.  Musculoskeletal: Strength appropriate in all 4 extremities for age with no limited ROM.  Skin: Color appropriate for race. Intact, warm, and well-perfused.  Psychiatric: Appropriate mood and affect. No apparent risk to self or others.    MDM: 43 year old woman w/ hx of rheumatic heart disease (severe mitral and moderate aortic regurgitation) with prior [facility] aortic and mitral valve replacements in 1996 w/ multiple revisions, hx of c-sections x3 c/b post-partum bleeding, s/p bronchoscopy w/ Dr. Green on 3/18 for pulm nodule Presents with hemoptysis.   On warfarin, discontinued Lovenox on her own 3 days ago.   Patient has several incidences of postsurgical complications with bleeding.  Given this concern that there may be bleeding at proximal site.  However with these new malignant cells concern is also for PE   In the setting of cancer.  Will get CTA   chest, chest x-ray, labs including coags to evaluate INR.  Will reach out to Dr. Garcia's team.  Dispo pending labs and imaging.

## 2024-03-24 NOTE — H&P ADULT - HISTORY OF PRESENT ILLNESS
Patient is a 44yo woman with a medical history of rheumatic heart disease (s/p aortic-mitral valve replacements, pulm nodule s/p bronschopy (3/18), atrial flutter s/p CTI ablation 7/2023 who presented to the ED with hemoptysis and now admitted to the MICU with left sided segmental PE      Patient reported symptoms starting at night after her bronchoscopy and progressively getting worse the next 3 days. Patient with (medication insert here)   She called her pulmonologist, and they were recommended to come in here.      Vitals in the ED were unremarkable for the patient.  Labs were noteworthy for HGH 9.6, coags elevated to 16.7/1.51/40.8, mild transaminase 109/72, CT angio was noted for left lower lobe filling defect, prominent pulm artery, left lower lobe consolidation      Other aspects of patient’s history include no allergies, no substance use, and she works as a dental hygienist and liver [facility] Her PCP is Dr. Kinsey, her primary pulmonologist is Dr. Stanford, She follows Dr. Jay Lisker for cardiology. Her kids recently tested positive for strep throat a Patient is a 42yo woman with a medical history of rheumatic heart disease (s/p aortic-mitral valve replacements, recently diagnosed pulm malignancy ( bronschopy (3/18), atrial flutter s/p CTI ablation 7/2023 who presented to the ED with hemoptysis and now admitted to the MICU with left sided segmental PE      Patient reported symptoms starting at night after her bronchoscopy and progressively getting worse the next 3 days. Patient with (medication insert here)   She called her pulmonologist, and they were recommended to come in here.      Vitals in the ED were unremarkable for the patient.  Labs were noteworthy for HGH 9.6, coags elevated to 16.7/1.51/40.8, mild transaminase 109/72, CT angio was noted for left lower lobe filling defect, prominent pulm artery, left lower lobe consolidation.    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 Her PCP is Dr. Kinsey, her primary pulmonologist is Dr. Stanford, She follows Dr. Jay Lisker for cardiology. Her kids recently tested positive for strep throat a Patient is a 44yo woman with a medical history of rheumatic heart disease (s/p aortic-mitral valve replacements), recently diagnosed pulm malignancy ( bronchoscopy 3/18), atrial flutter s/p CTI ablation 7/2023 who presented to the ED with hemoptysis and now admitted to the MICU with left sided segmental PE      Patient reported symptoms starting at night after her bronchoscopy and progressively getting worse the next 3 days. She was on a Lovenox bridge and warfarin but due to the bleeding she had held it for the past few days. Due to her symptoms not resolving she decided to call her pulmonologist who recommended coming in here. She denies any other symptoms like chest pain, shortness of breath, confusion, swelling in her lower extremities. She denies any potential sources of bleed.     Vitals in the ED were unremarkable for the patient.  Labs were noteworthy for HGH 9.6, coags elevated to 16.7/1.51/40.8, mild transaminase 109/72, CT angio was noted for left lower lobe filling defect, prominent pulm artery, left lower lobe consolidation.    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 Her PCP is Dr. Kinsey, her primary pulmonologist is Dr. Stanford, She follows Dr. Jay Lisker for cardiology. Her kids recently tested positive for strep throat and she has been on amoxicillin for prophylaxis.

## 2024-03-24 NOTE — ED ADULT NURSE NOTE - OBJECTIVE STATEMENT
Pt A&Ox4 ambulatory, PMH mitral valve replacement and endocarditis, presenting to the ED (RM 9) c/o blood in sputum. Pt  had a bronchoscopy Tuesday for a suspicious nodule. Pt  is on blood thinners. Pt  has been endorsing bloody sputum x 5 days and was referred to come to the ED. Pt endorses cough. Denies SOB, chest pain, dizziness, lightheadedness, HA, blurry vision, HA. Respirations are even and unlabored, NAD, 20G IV LAC, labs sent, pending CT and XR. Safety precautions implemented as per protocol, awaiting further MD orders, plan of care ongoing.

## 2024-03-24 NOTE — H&P ADULT - ATTENDING COMMENTS
44yo woman with a medical history of rheumatic heart disease (s/p aortic-mitral valve replacements, pulm nodule s/p bronschopy (3/18), atrial flutter s/p CTI ablation 7/2023 who presented to the ED with hemoptysis and now admitted to the MICU with left sided segmental PE and now on heparin drip.     Patient with recent diagnosis of pulmonary malignancy (specific type still being determined by pathology) who comes in for hemoptysis. CT chest found left sided segmental PE. Needs to be on AC for both mechanical valves and for PE. On home Warfarin but will go ahead and start heparin drip while in the MICU and monitor for hemoptysis. If massive hemoptysis occurs will intubate with 8.0 [place] tube for IP intervention.     Family with confirmed strep throat. Given high probability of infection will give dose of ceftriaxone and swab for strep throat. If negative, then can discontinue ceftriaxone.     Rest of care as per above.

## 2024-03-24 NOTE — CONSULT NOTE ADULT - SUBJECTIVE AND OBJECTIVE BOX
Patient seen and evaluated at bedside    Chief Complaint: hemoptysis    HPI:  42yo F with rheumatic heart disease s/p mechanical AVR and MVR in , re-op in 2018, on Warfarin, atrial flutter s/p CTI ablation 2023 who presents with hemoptysis. Underwent bronch on  for lung mass, Friday night began to develop small volume hemoptysis, dime to quarter size clots coughed up. WAs taking Lovenox as bridge for her bronch, held her Lovenox for the past 3 days due to the bleeding. No CP, SOB, no bleeding from other areas. Came to the ED for eval.    In the ED, afebrile, HDS. small, less than dime size clot seen in cup at bedside. Pulmonary planning on possible repeat bronch for hemoptysis eval.     PMHx:   Rheumatic Disease of Heart Valve    Endocarditis    Splenomegaly        PSHx:   Aortic Valve Replaced    Mitral Valve Stenosis and Aortic Valve Insufficiency     Section     Section    H/O mitral valve replacement        Allergies:  No Known Allergies      Home Meds:  Home Medications:  Coumadin 1 mg oral tablet: 12.5 milligram(s) orally once a day (19 Mar 2024 12:43)      Current Medications:   Warfarin 10mg daily twice a week and 15mg all other days  Lovenox 80mg BID    FAMILY HISTORY:  NC    Social History:  Smoking History: no  Alcohol Use: no  Drug Use: no    REVIEW OF SYSTEMS:  All other review of systems is negative unless indicated above.    Physical Exam:  T(F): 99.1 (-24), Max: 99.1 (-24)  HR: 83 (-24) (83 - 85)  BP: 112/60 (-24) (112/60 - 131/80)  RR: 18 (-24)  SpO2: 98% (-24)  GENERAL: No acute distress, well-developed  HEAD:  Atraumatic, Normocephalic  ENT: EOMI, PERRLA, conjunctiva and sclera clear, Neck supple, No JVD, moist mucosa  CHEST/LUNG: Clear to auscultation bilaterally; No wheeze, equal breath sounds bilaterally   HEART: Regular rate and rhythm; mechanical clicks at LUSB and apex  ABDOMEN: Soft, Nontender, Nondistended; Bowel sounds present  EXTREMITIES:  No clubbing, cyanosis, or edema  PSYCH: Nl behavior, nl affect  NEUROLOGY: AAOx3, non-focal, cranial nerves intact  SKIN: Normal color, No rashes or lesions  LINES:    Cardiovascular Diagnostic Testing:    ECG: Personally reviewed    Echo: Personally reviewed:  FELISHA from 10/25/2021: mechanical MVR, 3 paravalvular jets of MR - severe MR, mechanical AVR - flow into pocket posterior the aortic valve, fistular seen from this posterior pocket into the LA, no LA or TEA thrombus, normal RA, normal RV size and function, TV annuloplasty, no pericardial effusion     Stress Testing:    Cath:    Imaging:    CXR: Personally reviewed    Labs: Personally reviewed                        9.6    4.67  )-----------( 161      ( 24 Mar 2024 06:26 )             29.1         139  |  104  |  10  ----------------------------<  108<H>  4.0   |  23  |  0.43<L>    Ca    9.4      24 Mar 2024 02:38    TPro  7.1  /  Alb  4.3  /  TBili  0.8  /  DBili  x   /  AST  72<H>  /  ALT  109<H>  /  AlkPhos  59  03-24    PT/INR - ( 24 Mar 2024 02:38 )   PT: 16.7 sec;   INR: 1.51 ratio         PTT - ( 24 Mar 2024 02:38 )  PTT:40.8 sec                   Patient seen and evaluated at bedside    Chief Complaint: hemoptysis    HPI:  42yo F with rheumatic heart disease s/p mechanical AVR and MVR in , re-op in 2022 on Warfarin, atrial flutter s/p CTI ablation 2023 who presents with hemoptysis. Underwent bronch on  for lung mass, Friday night began to develop small volume hemoptysis, dime to quarter size clots coughed up. Was using Lovenox as bridge for her bronch, held her Lovenox for the past 3 days due to the bleeding. No CP, SOB, no bleeding from other areas. Came to the ED for eval.    In the ED, afebrile, HDS. small, less than dime size clot seen in cup at bedside. Pulmonary planning on possible repeat bronch for hemoptysis eval.     PMHx:   Rheumatic Disease of Heart Valve    Endocarditis    Splenomegaly        PSHx:   Aortic Valve Replaced    Mitral Valve Stenosis and Aortic Valve Insufficiency     Section     Section    H/O mitral valve replacement        Allergies:  No Known Allergies      Home Meds:  Home Medications:  Coumadin 1 mg oral tablet: 12.5 milligram(s) orally once a day (19 Mar 2024 12:43)      Current Medications:   Warfarin 10mg daily twice a week and 15mg all other days  Lovenox 80mg BID    FAMILY HISTORY:  NC    Social History:  Smoking History: no  Alcohol Use: no  Drug Use: no    REVIEW OF SYSTEMS:  All other review of systems is negative unless indicated above.    Physical Exam:  T(F): 99.1 (-24), Max: 99.1 (-24)  HR: 83 (-24) (83 - 85)  BP: 112/60 (-24) (112/60 - 131/80)  RR: 18 (-24)  SpO2: 98% (-24)  GENERAL: No acute distress, well-developed  HEAD:  Atraumatic, Normocephalic  ENT: EOMI, PERRLA, conjunctiva and sclera clear, Neck supple, No JVD, moist mucosa  CHEST/LUNG: Clear to auscultation bilaterally; No wheeze, equal breath sounds bilaterally   HEART: Regular rate and rhythm; mechanical clicks at LUSB and apex  ABDOMEN: Soft, Nontender, Nondistended; Bowel sounds present  EXTREMITIES:  No clubbing, cyanosis, or edema  PSYCH: Nl behavior, nl affect  NEUROLOGY: AAOx3, non-focal, cranial nerves intact  SKIN: Normal color, No rashes or lesions  LINES:    Cardiovascular Diagnostic Testing:        Labs:                        9.6    4.67  )-----------( 161      ( 24 Mar 2024 06:26 )             29.1         139  |  104  |  10  ----------------------------<  108<H>  4.0   |  23  |  0.43<L>    Ca    9.4      24 Mar 2024 02:38    TPro  7.1  /  Alb  4.3  /  TBili  0.8  /  DBili  x   /  AST  72<H>  /  ALT  109<H>  /  AlkPhos  59      PT/INR - ( 24 Mar 2024 02:38 )   PT: 16.7 sec;   INR: 1.51 ratio         PTT - ( 24 Mar 2024 02:38 )  PTT:40.8 sec    Echo 2023: Pediatric Cardiology Echo  Mitral Valve:  S/p ON-X 25/33 mm Conform-X mechanical mitral valve replacement (Ryan GIMENEZ, 22):  - Regular, brisk excursion of both leaflets with trivial transvalvar, mean gradient.  - No evidence of regurgitation although acoustic "shadowing" by the prosthesis may obscure Doppler interrogation for regurgitation.  Tricuspid Valve:  Normal tricuspid valve morphology and inflow Doppler profile. There is mild tricuspid valve regurgitation. S/p tricuspid valve repair with magic sutures between posterior and septal leaflets (KURT Valdes, 2018):  - Mild residual tricuspid regurgitation.  Left Ventricle:    The left ventricle is hypertrabeculated apically with regionally restricted basal motion adjacent to the mitral and aortic prosthetic rings, complicating quantification.  - Normal LV end-diastolic volume  - Normal LV mass, excluding the trabeculae  - Overall, global systolic function is normal to hyperdynamic with highly localized basal free wall and septal hypokinesis.  - Diastolic function cannot be quantified with tissue Doppler due to the presence of mitral mechanical prosthesis.  Right Ventricle:  No evidence of pulmonary hypertension. Pulmonary artery pressure estimate is based on tricuspid regurgitation peak systolic instantaneous gradient, pulmonary insufficiency end diastolic gradient and interventricular septal systolic configuration. Qualitatively, normal right ventricular size and systolic function.  - Automated 3D Volume data:  -- RV EDV - 114.6 ML  -- RV EF - 48.6%.

## 2024-03-24 NOTE — ED PROVIDER NOTE - PROGRESS NOTE DETAILS
Mukul Perez MD (PGY3): Received call from radiologist regarding CTA.   the mass in the left lower lobe with encasing the vasculature.  There may be a small subsegmental branch PE.   There is also some fluid collection around the ascending aorta however this may likely be nonactionable however hematoma at this time cannot be ruled out.  Pulmonology team will come in at 7.   As a precaution, Informed MICU team patient is in the ED.  patient is hemodynamically stable at this time.  If for any reason she decompensates will consult MICU. patient amenable to admission. Mukul Perez MD (PGY3): patient endorsed to hospitalist.

## 2024-03-24 NOTE — CONSULT NOTE ADULT - SUBJECTIVE AND OBJECTIVE BOX
CHIEF COMPLAINT: Coughing up blood    HPI:  44YO Female never smokerPMH rheumatic heart disease (s/p mechanical aortic and mitral valve on warfarin), AFlutter s/p CTI ablation 2023 and LLL spiculated nodule with hilar lymphadenopathy s/p bx of LLL nodule, 4R, 4L and 11L nodules who was admitted 3/24 for hemoptysis.    She reported that initially post bronchoscopy she was coughing up dime sized drops ob blood but progressively worsened to quarter sized drops. During this time time she was on Lovenox and bridging back to her home warfarin. Given her persistent symptoms she held her Lovenox and called on call Pulmonary and her cardiologist on call Dr. Moura who advised her to come to the ER to be evaluated. She denied SOB, chest pain or worsening bleeding since being admitted the hospital. The day of admission her last reported episode of significant hemoptysis was midnight though in the AM she noted specks of blood in her sputum.    In ED she was hemodynamically stable, Labs notable for INT 1.51, mild transaminase 109/72, CT angio was noted for left lower lobe filling defect, prominent pulm artery, Poorly defined left lower lobe consolidative mass encasing adjacent vessels and left lower lobe bronchus, concerning for neoplasm or metastatic disease.    Of note the biopsy from 3/19 resulted positive for malignant cells in the LLL nodule, 11L lymph node. 4R and 4L lymph node and BAL returned with atypical cells.    Additionally, multiple family members tested positive for strep but her swab was reportedly negative.     Interventional pulmonology consulted for hemoptysis.       PAST MEDICAL & SURGICAL HISTORY:  Rheumatic Disease of Heart Valve Age 16  Endocarditis  Splenomegaly  Aortic Valve Replaced  Mitral Valve Stenosis and Aortic Valve Insufficiency     Section @ 41wks secondary to fetal distress Complicated by post-operative hemorrhage requiring transfusion   Section @ 35wks secondary to formation of hematoma while on anticoagulation Complicated by post-operative infection  H/O mitral valve replacement    FAMILY HISTORY:      SOCIAL HISTORY:  Smoking: [ ] Never Smoked [ ] Former Smoker (__ packs x ___ years) [ ] Current Smoker  (__ packs x ___ years)  Substance Use: [ ] Never Used [ ] Used ____  EtOH Use:  Marital Status: [ ] Single [ ]  [ ]  [ ]   Sexual History:   Occupation:  Recent Travel:  Country of Birth:  Advance Directives:    Allergies    No Known Allergies    Intolerances        HOME MEDICATIONS:  Home Medications:  Coumadin 1 mg oral tablet: 12.5 milligram(s) orally once a day (24 Mar 2024 13:19)      REVIEW OF SYSTEMS:  Constitutional: [ ] negative [ ] fevers [ ] chills [ ] weight loss [ ] weight gain  HEENT: [ ] negative [ ] dry eyes [ ] eye irritation [ ] postnasal drip [ ] nasal congestion  CV: [ ] negative  [ ] chest pain [ ] orthopnea [ ] palpitations [ ] murmur  Resp: [ ] negative [ ] cough [ ] shortness of breath [ ] dyspnea [ ] wheezing [ ] sputum [ x] hemoptysis  GI: [ ] negative [ ] nausea [ ] vomiting [ ] diarrhea [ ] constipation [ ] abd pain [ ] dysphagia   : [ ] negative [ ] dysuria [ ] nocturia [ ] hematuria [ ] increased urinary frequency  Musculoskeletal: [ ] negative [ ] back pain [ ] myalgias [ ] arthralgias [ ] fracture  Skin: [ ] negative [ ] rash [ ] itch  Neurological: [ ] negative [ ] headache [ ] dizziness [ ] syncope [ ] weakness [ ] numbness  Psychiatric: [ ] negative [ ] anxiety [ ] depression  Endocrine: [ ] negative [ ] diabetes [ ] thyroid problem  Hematologic/Lymphatic: [ ] negative [ ] anemia [ ] bleeding problem  Allergic/Immunologic: [ ] negative [ ] itchy eyes [ ] nasal discharge [ ] hives [ ] angioedema  [x ] All other systems negative  [ ] Unable to assess ROS because ________    OBJECTIVE:  ICU Vital Signs Last 24 Hrs  T(C): 36.9 (24 Mar 2024 16:00), Max: 37.3 (24 Mar 2024 07:32)  T(F): 98.4 (24 Mar 2024 16:00), Max: 99.1 (24 Mar 2024 07:32)  HR: 88 (24 Mar 2024 18:00) (80 - 89)  BP: 132/70 (24 Mar 2024 18:00) (61/46 - 132/70)  BP(mean): 83 (24 Mar 2024 18:00) (49 - 83)  ABP: --  ABP(mean): --  RR: 15 (24 Mar 2024 18:00) (10 - 20)  SpO2: 96% (24 Mar 2024 18:00) (96% - 100%)    O2 Parameters below as of 24 Mar 2024 12:15  Patient On (Oxygen Delivery Method): room air       @ 07:01  -   @ 18:16  --------------------------------------------------------  IN: 95 mL / OUT: 0 mL / NET: 95 mL      CAPILLARY BLOOD GLUCOSE    PHYSICAL EXAM:  General: Well appearing female sitting comfortably in bed  HEENT: Normal sclera  Lymph Nodes: No LAD  Respiratory: CTA bilaterally  Cardiovascular: Regular rate and rhythm no m/r/g  Abdomen: Nontender nondistended  Extremities: No peripheral edema  Neurological: No appreciable neurologic deficits  Psychiatry: Appropriate mood and affect    LINES:     HOSPITAL MEDICATIONS:  Standing Meds:  cefTRIAXone   IVPB 1000 milliGRAM(s) IV Intermittent every 24 hours  heparin  Infusion.  Unit(s)/Hr IV Continuous <Continuous>      PRN Meds:      LABS:                        9.6    4.67  )-----------( 161      ( 24 Mar 2024 06:26 )             29.1     Hgb Trend: 9.6<--, 9.1<--      139  |  104  |  10  ----------------------------<  108<H>  4.0   |  23  |  0.43<L>    Ca    9.4      24 Mar 2024 02:38    TPro  7.1  /  Alb  4.3  /  TBili  0.8  /  DBili  x   /  AST  72<H>  /  ALT  109<H>  /  AlkPhos  59      Creatinine Trend: 0.43<--  PT/INR - ( 24 Mar 2024 02:38 )   PT: 16.7 sec;   INR: 1.51 ratio         PTT - ( 24 Mar 2024 02:38 )  PTT:40.8 sec  Urinalysis Basic - ( 24 Mar 2024 02:38 )    Color: x / Appearance: x / SG: x / pH: x  Gluc: 108 mg/dL / Ketone: x  / Bili: x / Urobili: x   Blood: x / Protein: x / Nitrite: x   Leuk Esterase: x / RBC: x / WBC x   Sq Epi: x / Non Sq Epi: x / Bacteria: x            MICROBIOLOGY:       RADIOLOGY:  [ ] Reviewed and interpreted by me    PULMONARY FUNCTION TESTS:    EKG: CHIEF COMPLAINT: Coughing up blood    HPI:  44YO Female never smokerPMH rheumatic heart disease (s/p mechanical aortic and mitral valve on warfarin), AFlutter s/p CTI ablation 2023 and LLL spiculated nodule with hilar lymphadenopathy s/p bx of LLL nodule, 4R, 4L and 11L nodules who was admitted 3/24 for hemoptysis.    She reported that initially post bronchoscopy she was coughing up dime sized drops ob blood but progressively worsened to quarter sized drops. During this time time she was on Lovenox and bridging back to her home warfarin. Given her persistent symptoms she held her Lovenox and called on call Pulmonary and her cardiologist on call Dr. Moura who advised her to come to the ER to be evaluated. She denied SOB, chest pain or worsening bleeding since being admitted the hospital. The day of admission her last reported episode of significant hemoptysis was midnight though in the AM she noted specks of blood in her sputum.    In ED she was hemodynamically stable, Labs notable for INT 1.51, mild transaminase 109/72, CT angio was noted for left lower lobe filling defect, prominent pulm artery, Poorly defined left lower lobe consolidative mass encasing adjacent vessels and left lower lobe bronchus, concerning for neoplasm or metastatic disease.    Of note the biopsy from 3/19 resulted positive for malignant cells in the LLL nodule, 11L lymph node. 4R and 4L lymph node and BAL returned with atypical cells.    Additionally, multiple family members tested positive for strep but her swab was reportedly negative.     Interventional pulmonology consulted for hemoptysis.       PAST MEDICAL & SURGICAL HISTORY:  Rheumatic Disease of Heart Valve Age 16  Endocarditis  Splenomegaly  Aortic Valve Replaced  Mitral Valve Stenosis and Aortic Valve Insufficiency     Section @ 41wks secondary to fetal distress Complicated by post-operative hemorrhage requiring transfusion   Section @ 35wks secondary to formation of hematoma while on anticoagulation Complicated by post-operative infection  H/O mitral valve replacement    FAMILY HISTORY:      SOCIAL HISTORY:  Smoking: [ ] Never Smoked [ ] Former Smoker (__ packs x ___ years) [ ] Current Smoker  (__ packs x ___ years)  Substance Use: [ ] Never Used [ ] Used ____  EtOH Use:  Marital Status: [ ] Single [ ]  [ ]  [ ]   Sexual History:   Occupation:  Recent Travel:  Country of Birth:  Advance Directives:    Allergies    No Known Allergies    Intolerances        HOME MEDICATIONS:  Home Medications:  Coumadin 1 mg oral tablet: 12.5 milligram(s) orally once a day (24 Mar 2024 13:19)      REVIEW OF SYSTEMS:  Constitutional: [ ] negative [ ] fevers [ ] chills [ ] weight loss [ ] weight gain  HEENT: [ ] negative [ ] dry eyes [ ] eye irritation [ ] postnasal drip [ ] nasal congestion  CV: [ ] negative  [ ] chest pain [ ] orthopnea [ ] palpitations [ ] murmur  Resp: [ ] negative [ ] cough [ ] shortness of breath [ ] dyspnea [ ] wheezing [ ] sputum [ x] hemoptysis  GI: [ ] negative [ ] nausea [ ] vomiting [ ] diarrhea [ ] constipation [ ] abd pain [ ] dysphagia   : [ ] negative [ ] dysuria [ ] nocturia [ ] hematuria [ ] increased urinary frequency  Musculoskeletal: [ ] negative [ ] back pain [ ] myalgias [ ] arthralgias [ ] fracture  Skin: [ ] negative [ ] rash [ ] itch  Neurological: [ ] negative [ ] headache [ ] dizziness [ ] syncope [ ] weakness [ ] numbness  Psychiatric: [ ] negative [ ] anxiety [ ] depression  Endocrine: [ ] negative [ ] diabetes [ ] thyroid problem  Hematologic/Lymphatic: [ ] negative [ ] anemia [ ] bleeding problem  Allergic/Immunologic: [ ] negative [ ] itchy eyes [ ] nasal discharge [ ] hives [ ] angioedema  [x ] All other systems negative  [ ] Unable to assess ROS because ________    OBJECTIVE:  ICU Vital Signs Last 24 Hrs  T(C): 36.9 (24 Mar 2024 16:00), Max: 37.3 (24 Mar 2024 07:32)  T(F): 98.4 (24 Mar 2024 16:00), Max: 99.1 (24 Mar 2024 07:32)  HR: 88 (24 Mar 2024 18:00) (80 - 89)  BP: 132/70 (24 Mar 2024 18:00) (61/46 - 132/70)  BP(mean): 83 (24 Mar 2024 18:00) (49 - 83)  RR: 15 (24 Mar 2024 18:00) (10 - 20)  SpO2: 96% (24 Mar 2024 18:00) (96% - 100%)    O2 Parameters below as of 24 Mar 2024 12:15  Patient On (Oxygen Delivery Method): room air       @ 07:01  -   @ 18:16  --------------------------------------------------------  IN: 95 mL / OUT: 0 mL / NET: 95 mL      CAPILLARY BLOOD GLUCOSE    PHYSICAL EXAM:  General: Well appearing female sitting comfortably in bed  HEENT: Normal sclera  Lymph Nodes: No LAD  Respiratory: CTA bilaterally  Cardiovascular: Regular rate and rhythm no m/r/g  Abdomen: Nontender nondistended  Extremities: No peripheral edema  Neurological: No appreciable neurologic deficits  Psychiatry: Appropriate mood and affect    LINES:     HOSPITAL MEDICATIONS:  Standing Meds:  cefTRIAXone   IVPB 1000 milliGRAM(s) IV Intermittent every 24 hours  heparin  Infusion.  Unit(s)/Hr IV Continuous <Continuous>      PRN Meds:      LABS:                        9.6    4.67  )-----------( 161      ( 24 Mar 2024 06:26 )             29.1     Hgb Trend: 9.6<--, 9.1<--      139  |  104  |  10  ----------------------------<  108<H>  4.0   |  23  |  0.43<L>    Ca    9.4      24 Mar 2024 02:38    TPro  7.1  /  Alb  4.3  /  TBili  0.8  /  DBili  x   /  AST  72<H>  /  ALT  109<H>  /  AlkPhos  59      Creatinine Trend: 0.43<--  PT/INR - ( 24 Mar 2024 02:38 )   PT: 16.7 sec;   INR: 1.51 ratio         PTT - ( 24 Mar 2024 02:38 )  PTT:40.8 sec  Urinalysis Basic - ( 24 Mar 2024 02:38 )    Color: x / Appearance: x / SG: x / pH: x  Gluc: 108 mg/dL / Ketone: x  / Bili: x / Urobili: x   Blood: x / Protein: x / Nitrite: x   Leuk Esterase: x / RBC: x / WBC x   Sq Epi: x / Non Sq Epi: x / Bacteria: x            MICROBIOLOGY:       RADIOLOGY:  [ ] Reviewed and interpreted by me    PULMONARY FUNCTION TESTS:    EKG:

## 2024-03-24 NOTE — ED PROVIDER NOTE - ATTENDING CONTRIBUTION TO CARE
I have personally performed a face to face medical and diagnostic evaluation of the patient. I have discussed with and reviewed the Resident's note and agree with the History, ROS, Physical Exam and MDM unless otherwise indicated. A brief summary of my personal evaluation and impression can be found below.     43 year old woman w/ hx of rheumatic heart disease (severe mitral and moderate aortic regurgitation) with prior [employer] [facility] aortic and mitral valve replacements in 1996 w/ multiple revisions, hx of c-sections x3 c/b post-partum bleeding, s/p bronchoscopy w/ Dr. Green on 3/18 for pulm nodule Presents with hemoptysis. +on AC for mechanical valve.  Review, biopsy done from the [facility] concerning for possible malignancy from 7 samples.  Patient made aware.  On exam, vital signs stable, lungs clear to auscultation bilaterally.  No other focal findings, patient overall well-appearing.  Significant amount of blood noted in the cup that patient brought with her.  Given CT findings, some concern for possible PE. Will get CTA   chest, chest x-ray, labs including coags to evaluate INR.  Will reach out to Dr. Garcia's team.   Patient will need admission, will reassess to dispo. Teri Webb, ED Attending

## 2024-03-24 NOTE — ED ADULT NURSE REASSESSMENT NOTE - NS ED NURSE REASSESS COMMENT FT1
Pt appears to be resting comfortably. NAD, respirations are even and unlabored, no complaints at this moment, Safety precautions implemented as per protocol, awaiting further MD orders, plan of care ongoing.

## 2024-03-24 NOTE — CONSULT NOTE ADULT - ASSESSMENT
42yo F with rheumatic heart disease s/p mechanical AVR and MVR in 1996, re-op in 2018, on Warfarin, atrial flutter s/p CTI ablation 7/2023 who presents with hemoptysis. Cardiology consulted for AC recs. Due to mechanical valves and incidental likely PE found on CT Chest, patient needs anticoagulation, which should only be held in the presence of a life threatening bleed. Discussed risks of AC with patient including worsening airway bleed and need for emergent intubation, which patient understands.    Recommendations:  - start heparin drip no bolus, when PTT therapeutic and if no planned procedures can reintroduce home Warfarin  - Appreciate Pulm recs re repeat bronch and hemoptysis workup  - Obtain LE Dopplers, repeat TTE  - Close airway monitoring while on AC 43 year old woman with rheumatic heart disease s/p mechanical AVR and MVR in 1996 that required re-op in 2018, (with revision in 2022 for dehiscence) on Warfarin, atrial flutter s/p CTI ablation 7/2023 who presents with hemoptysis following recent bronchoscopic biopsy of new lung mass. Cardiology consulted for AC recs. Due to mechanical valves and incidental likely PE found on CT Chest, patient needs anticoagulation, which should only be held in the presence of a life threatening bleed. Discussed risks of AC with patient including worsening airway bleed and need for emergent intubation, which patient understands.    Recommendations:  - start heparin drip no bolus, when PTT therapeutic and if no planned procedures can reintroduce home Warfarin  - Monitor for recurrent bleeding on therapeutic anticoagulation, if uncontrolled to consider endobronchial cauterization   - Obtain LE Dopplers, repeat TTE  - Close airway monitoring while on AC

## 2024-03-24 NOTE — H&P ADULT - NSHPLABSRESULTS_GEN_ALL_CORE
LABS:                         9.6    4.67  )-----------( 161      ( 24 Mar 2024 06:26 )             29.1     03-24    139  |  104  |  10  ----------------------------<  108<H>  4.0   |  23  |  0.43<L>    Ca    9.4      24 Mar 2024 02:38    TPro  7.1  /  Alb  4.3  /  TBili  0.8  /  DBili  x   /  AST  72<H>  /  ALT  109<H>  /  AlkPhos  59  03-24    PT/INR - ( 24 Mar 2024 02:38 )   PT: 16.7 sec;   INR: 1.51 ratio         PTT - ( 24 Mar 2024 02:38 )  PTT:40.8 sec  Urinalysis Basic - ( 24 Mar 2024 02:38 )    Color: x / Appearance: x / SG: x / pH: x  Gluc: 108 mg/dL / Ketone: x  / Bili: x / Urobili: x   Blood: x / Protein: x / Nitrite: x   Leuk Esterase: x / RBC: x / WBC x   Sq Epi: x / Non Sq Epi: x / Bacteria: x                RADIOLOGY, EKG & ADDITIONAL TESTS: LABS:                      9.6    4.67  )-----------( 161      ( 24 Mar 2024 06:26 )             29.1     03-24    139  |  104  |  10  ----------------------------<  108<H>  4.0   |  23  |  0.43<L>    Ca    9.4      24 Mar 2024 02:38  TPro  7.1  /  Alb  4.3  /  TBili  0.8  /  DBili  x   /  AST  72<H>  /  ALT  109<H>  /  AlkPhos  59  03-24  PT/INR - ( 24 Mar 2024 02:38 )   PT: 16.7 sec;   INR: 1.51 ratio    PTT - ( 24 Mar 2024 02:38 )  PTT:40.8 sec  Urinalysis Basic - ( 24 Mar 2024 02:38 )  Color: x / Appearance: x / SG: x / pH: x  Gluc: 108 mg/dL / Ketone: x  / Bili: x / Urobili: x   Blood: x / Protein: x / Nitrite: x   Leuk Esterase: x / RBC: x / WBC x   Sq Epi: x / Non Sq Epi: x / Bacteria:   RADIOLOGY, EKG & ADDITIONAL TESTS:

## 2024-03-24 NOTE — CONSULT NOTE ADULT - TIME BILLING
Discussion with patient and Dr. Garcia and review of the patients outpatient records, including cardiologist's notes for surgical history, chest and cardiac imaging.

## 2024-03-24 NOTE — ED ADULT TRIAGE NOTE - CHIEF COMPLAINT QUOTE
Patient c/o blood in sputum x 5 days s/p bronchoscopy. Denies SOB, chest pain and dizziness. Hx. mitral valve replacement and endocarditis.

## 2024-03-24 NOTE — H&P ADULT - NSHPPHYSICALEXAM_GEN_ALL_CORE
GENERAL: No acute distress   HEAD:  Atraumatic, normocephalic  EYES: Eye movements are appropriate, pupils responsive to light, conjunctiva and sclera clear  NECK: Supple, no swelling , JVD not appreciated   HEART: Mechanical valve sounds, occasional irregular rhythm   LUNGS: Unlabored respirations. Clear to auscultation bilaterally, no crackles, wheezing, or rhonchi  ABDOMEN: Soft, nontender/ nondistended, +BS  EXTREMITIES: +2 pulses, moves appropriately w /5 strength, no edema   NEURO:  A&Ox3, CNs not assessed  PSYCH: Affect acutely depressed   SKIN: No rashes or lesions

## 2024-03-25 ENCOUNTER — RESULT REVIEW (OUTPATIENT)
Age: 44
End: 2024-03-25

## 2024-03-25 LAB
ALBUMIN SERPL ELPH-MCNC: 4.1 G/DL — SIGNIFICANT CHANGE UP (ref 3.3–5)
ALBUMIN SERPL ELPH-MCNC: 4.5 G/DL — SIGNIFICANT CHANGE UP (ref 3.3–5)
ALP SERPL-CCNC: 60 U/L — SIGNIFICANT CHANGE UP (ref 40–120)
ALP SERPL-CCNC: 66 U/L — SIGNIFICANT CHANGE UP (ref 40–120)
ALT FLD-CCNC: 89 U/L — HIGH (ref 4–33)
ALT FLD-CCNC: 95 U/L — HIGH (ref 4–33)
ANION GAP SERPL CALC-SCNC: 12 MMOL/L — SIGNIFICANT CHANGE UP (ref 7–14)
ANION GAP SERPL CALC-SCNC: 12 MMOL/L — SIGNIFICANT CHANGE UP (ref 7–14)
APTT BLD: 48.1 SEC — HIGH (ref 24.5–35.6)
APTT BLD: 65.8 SEC — HIGH (ref 24.5–35.6)
APTT BLD: 86.5 SEC — HIGH (ref 24.5–35.6)
AST SERPL-CCNC: 58 U/L — HIGH (ref 4–32)
AST SERPL-CCNC: 62 U/L — HIGH (ref 4–32)
BASOPHILS # BLD AUTO: 0.03 K/UL — SIGNIFICANT CHANGE UP (ref 0–0.2)
BASOPHILS NFR BLD AUTO: 0.6 % — SIGNIFICANT CHANGE UP (ref 0–2)
BILIRUB SERPL-MCNC: 0.8 MG/DL — SIGNIFICANT CHANGE UP (ref 0.2–1.2)
BILIRUB SERPL-MCNC: 0.8 MG/DL — SIGNIFICANT CHANGE UP (ref 0.2–1.2)
BLD GP AB SCN SERPL QL: NEGATIVE — SIGNIFICANT CHANGE UP
BUN SERPL-MCNC: 10 MG/DL — SIGNIFICANT CHANGE UP (ref 7–23)
BUN SERPL-MCNC: 6 MG/DL — LOW (ref 7–23)
CALCIUM SERPL-MCNC: 8.9 MG/DL — SIGNIFICANT CHANGE UP (ref 8.4–10.5)
CALCIUM SERPL-MCNC: 9.4 MG/DL — SIGNIFICANT CHANGE UP (ref 8.4–10.5)
CHLORIDE SERPL-SCNC: 101 MMOL/L — SIGNIFICANT CHANGE UP (ref 98–107)
CHLORIDE SERPL-SCNC: 103 MMOL/L — SIGNIFICANT CHANGE UP (ref 98–107)
CO2 SERPL-SCNC: 24 MMOL/L — SIGNIFICANT CHANGE UP (ref 22–31)
CO2 SERPL-SCNC: 25 MMOL/L — SIGNIFICANT CHANGE UP (ref 22–31)
CREAT SERPL-MCNC: 0.38 MG/DL — LOW (ref 0.5–1.3)
CREAT SERPL-MCNC: 0.49 MG/DL — LOW (ref 0.5–1.3)
EGFR: 120 ML/MIN/1.73M2 — SIGNIFICANT CHANGE UP
EGFR: 127 ML/MIN/1.73M2 — SIGNIFICANT CHANGE UP
EOSINOPHIL # BLD AUTO: 0.39 K/UL — SIGNIFICANT CHANGE UP (ref 0–0.5)
EOSINOPHIL NFR BLD AUTO: 7.5 % — HIGH (ref 0–6)
GLUCOSE SERPL-MCNC: 106 MG/DL — HIGH (ref 70–99)
GLUCOSE SERPL-MCNC: 99 MG/DL — SIGNIFICANT CHANGE UP (ref 70–99)
HCT VFR BLD CALC: 28 % — LOW (ref 34.5–45)
HCT VFR BLD CALC: 30.3 % — LOW (ref 34.5–45)
HGB BLD-MCNC: 9.3 G/DL — LOW (ref 11.5–15.5)
HGB BLD-MCNC: 9.8 G/DL — LOW (ref 11.5–15.5)
IANC: 2.97 K/UL — SIGNIFICANT CHANGE UP (ref 1.8–7.4)
IMM GRANULOCYTES NFR BLD AUTO: 0.4 % — SIGNIFICANT CHANGE UP (ref 0–0.9)
INR BLD: 1.34 RATIO — HIGH (ref 0.85–1.18)
INR BLD: 1.56 RATIO — HIGH (ref 0.85–1.18)
INR PPP: 1.3
LYMPHOCYTES # BLD AUTO: 1.35 K/UL — SIGNIFICANT CHANGE UP (ref 1–3.3)
LYMPHOCYTES # BLD AUTO: 26.1 % — SIGNIFICANT CHANGE UP (ref 13–44)
MAGNESIUM SERPL-MCNC: 1.7 MG/DL — SIGNIFICANT CHANGE UP (ref 1.6–2.6)
MAGNESIUM SERPL-MCNC: 1.9 MG/DL — SIGNIFICANT CHANGE UP (ref 1.6–2.6)
MCHC RBC-ENTMCNC: 30.1 PG — SIGNIFICANT CHANGE UP (ref 27–34)
MCHC RBC-ENTMCNC: 30.6 PG — SIGNIFICANT CHANGE UP (ref 27–34)
MCHC RBC-ENTMCNC: 32.3 GM/DL — SIGNIFICANT CHANGE UP (ref 32–36)
MCHC RBC-ENTMCNC: 33.2 GM/DL — SIGNIFICANT CHANGE UP (ref 32–36)
MCV RBC AUTO: 92.1 FL — SIGNIFICANT CHANGE UP (ref 80–100)
MCV RBC AUTO: 92.9 FL — SIGNIFICANT CHANGE UP (ref 80–100)
MONOCYTES # BLD AUTO: 0.42 K/UL — SIGNIFICANT CHANGE UP (ref 0–0.9)
MONOCYTES NFR BLD AUTO: 8.1 % — SIGNIFICANT CHANGE UP (ref 2–14)
NEUTROPHILS # BLD AUTO: 2.97 K/UL — SIGNIFICANT CHANGE UP (ref 1.8–7.4)
NEUTROPHILS NFR BLD AUTO: 57.3 % — SIGNIFICANT CHANGE UP (ref 43–77)
NRBC # BLD: 0 /100 WBCS — SIGNIFICANT CHANGE UP (ref 0–0)
NRBC # BLD: 0 /100 WBCS — SIGNIFICANT CHANGE UP (ref 0–0)
NRBC # FLD: 0 K/UL — SIGNIFICANT CHANGE UP (ref 0–0)
NRBC # FLD: 0 K/UL — SIGNIFICANT CHANGE UP (ref 0–0)
PHOSPHATE SERPL-MCNC: 3.5 MG/DL — SIGNIFICANT CHANGE UP (ref 2.5–4.5)
PHOSPHATE SERPL-MCNC: 3.6 MG/DL — SIGNIFICANT CHANGE UP (ref 2.5–4.5)
PLATELET # BLD AUTO: 176 K/UL — SIGNIFICANT CHANGE UP (ref 150–400)
PLATELET # BLD AUTO: 210 K/UL — SIGNIFICANT CHANGE UP (ref 150–400)
POTASSIUM SERPL-MCNC: 3.8 MMOL/L — SIGNIFICANT CHANGE UP (ref 3.5–5.3)
POTASSIUM SERPL-MCNC: 4 MMOL/L — SIGNIFICANT CHANGE UP (ref 3.5–5.3)
POTASSIUM SERPL-SCNC: 3.8 MMOL/L — SIGNIFICANT CHANGE UP (ref 3.5–5.3)
POTASSIUM SERPL-SCNC: 4 MMOL/L — SIGNIFICANT CHANGE UP (ref 3.5–5.3)
PROT SERPL-MCNC: 7.1 G/DL — SIGNIFICANT CHANGE UP (ref 6–8.3)
PROT SERPL-MCNC: 7.4 G/DL — SIGNIFICANT CHANGE UP (ref 6–8.3)
PROTHROM AB SERPL-ACNC: 15 SEC — HIGH (ref 9.5–13)
PROTHROM AB SERPL-ACNC: 17.4 SEC — HIGH (ref 9.5–13)
PROTHROMBIN TIME COMMENT: NORMAL
RBC # BLD: 3.04 M/UL — LOW (ref 3.8–5.2)
RBC # BLD: 3.26 M/UL — LOW (ref 3.8–5.2)
RBC # FLD: 16 % — HIGH (ref 10.3–14.5)
RBC # FLD: 16.1 % — HIGH (ref 10.3–14.5)
RH IG SCN BLD-IMP: POSITIVE — SIGNIFICANT CHANGE UP
S PYO DNA THROAT QL NAA+PROBE: SIGNIFICANT CHANGE UP
SODIUM SERPL-SCNC: 137 MMOL/L — SIGNIFICANT CHANGE UP (ref 135–145)
SODIUM SERPL-SCNC: 140 MMOL/L — SIGNIFICANT CHANGE UP (ref 135–145)
WBC # BLD: 5.18 K/UL — SIGNIFICANT CHANGE UP (ref 3.8–10.5)
WBC # BLD: 5.86 K/UL — SIGNIFICANT CHANGE UP (ref 3.8–10.5)
WBC # FLD AUTO: 5.18 K/UL — SIGNIFICANT CHANGE UP (ref 3.8–10.5)
WBC # FLD AUTO: 5.86 K/UL — SIGNIFICANT CHANGE UP (ref 3.8–10.5)

## 2024-03-25 PROCEDURE — 99233 SBSQ HOSP IP/OBS HIGH 50: CPT

## 2024-03-25 PROCEDURE — 93970 EXTREMITY STUDY: CPT | Mod: 26

## 2024-03-25 PROCEDURE — 99291 CRITICAL CARE FIRST HOUR: CPT | Mod: GC

## 2024-03-25 RX ORDER — TRANEXAMIC ACID 100 MG/ML
500 INJECTION, SOLUTION INTRAVENOUS EVERY 8 HOURS
Refills: 0 | Status: DISCONTINUED | OUTPATIENT
Start: 2024-03-25 | End: 2024-03-26

## 2024-03-25 RX ORDER — HEPARIN SODIUM 5000 [USP'U]/ML
1100 INJECTION INTRAVENOUS; SUBCUTANEOUS
Qty: 25000 | Refills: 0 | Status: DISCONTINUED | OUTPATIENT
Start: 2024-03-25 | End: 2024-03-25

## 2024-03-25 RX ORDER — CHLORHEXIDINE GLUCONATE 213 G/1000ML
1 SOLUTION TOPICAL DAILY
Refills: 0 | Status: DISCONTINUED | OUTPATIENT
Start: 2024-03-25 | End: 2024-03-27

## 2024-03-25 RX ORDER — HEPARIN SODIUM 5000 [USP'U]/ML
1100 INJECTION INTRAVENOUS; SUBCUTANEOUS
Qty: 25000 | Refills: 0 | Status: DISCONTINUED | OUTPATIENT
Start: 2024-03-25 | End: 2024-03-26

## 2024-03-25 RX ORDER — HEPARIN SODIUM 5000 [USP'U]/ML
1200 INJECTION INTRAVENOUS; SUBCUTANEOUS
Qty: 25000 | Refills: 0 | Status: DISCONTINUED | OUTPATIENT
Start: 2024-03-25 | End: 2024-03-25

## 2024-03-25 RX ORDER — TRANEXAMIC ACID 100 MG/ML
500 INJECTION, SOLUTION INTRAVENOUS EVERY 8 HOURS
Refills: 0 | Status: DISCONTINUED | OUTPATIENT
Start: 2024-03-25 | End: 2024-03-25

## 2024-03-25 RX ADMIN — TRANEXAMIC ACID 500 MILLIGRAM(S): 100 INJECTION, SOLUTION INTRAVENOUS at 02:21

## 2024-03-25 RX ADMIN — HEPARIN SODIUM 11 UNIT(S)/HR: 5000 INJECTION INTRAVENOUS; SUBCUTANEOUS at 22:03

## 2024-03-25 RX ADMIN — HEPARIN SODIUM 11 UNIT(S)/HR: 5000 INJECTION INTRAVENOUS; SUBCUTANEOUS at 03:45

## 2024-03-25 RX ADMIN — TRANEXAMIC ACID 500 MILLIGRAM(S): 100 INJECTION, SOLUTION INTRAVENOUS at 09:27

## 2024-03-25 RX ADMIN — HEPARIN SODIUM 11 UNIT(S)/HR: 5000 INJECTION INTRAVENOUS; SUBCUTANEOUS at 07:13

## 2024-03-25 RX ADMIN — HEPARIN SODIUM 11 UNIT(S)/HR: 5000 INJECTION INTRAVENOUS; SUBCUTANEOUS at 15:25

## 2024-03-25 RX ADMIN — HEPARIN SODIUM 1200 UNIT(S)/HR: 5000 INJECTION INTRAVENOUS; SUBCUTANEOUS at 13:40

## 2024-03-25 RX ADMIN — TRANEXAMIC ACID 500 MILLIGRAM(S): 100 INJECTION, SOLUTION INTRAVENOUS at 20:41

## 2024-03-25 RX ADMIN — HEPARIN SODIUM 11 UNIT(S)/HR: 5000 INJECTION INTRAVENOUS; SUBCUTANEOUS at 00:13

## 2024-03-25 RX ADMIN — CEFTRIAXONE 100 MILLIGRAM(S): 500 INJECTION, POWDER, FOR SOLUTION INTRAMUSCULAR; INTRAVENOUS at 16:44

## 2024-03-25 NOTE — PROGRESS NOTE ADULT - ATTENDING COMMENTS
44yo woman with a medical history of rheumatic heart disease (s/p aortic-mitral valve replacements, pulm nodule s/p bronchoscopy (3/18), atrial flutter s/p CTI ablation 7/2023 who presented to the ED with hemoptysis and now admitted to the MICU with left sided segmental PE and now on heparin drip.     Had episode of hemoptysis overnight while sub-therapeutic PTT    # hemoptysis  # L segmental PE  # mechanical valve  - will c/w heparin gtt, f/u IP reccs  - may need bronchoscopy  - c/w ceftriaxone for now, repeat strep test pending

## 2024-03-25 NOTE — CONSULT NOTE ADULT - ATTENDING COMMENTS
This is a 43 woman with a history of rheumatic heart diease, both aortic and mitral valve replacements.  history of pulmonary nodules s/p bronchoscopy 3/18/24, pathology remains pending, current prelim had identified some form of malignancy, type still being determined.  Patient presented to the ED with hemoptysis following biopsy.  Patient off coumadin on heparin drip with a narrowed goal PTT due to history of valvular replacement and left segmental PE.  Possible repeat bronchoscopy to look for bleeding area. If mass is finalized to be malignancy may benefit from radiation treatment for bleeding lung mass if does not stop in a couple of days. Further oncologic recommendations to follow after confirmation of the mass pathology.
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 Recent biopsy, and then bridged with lovenox and the started coumadin due to mechanical MV and AV. No bleeding was 3-4 days after the procedure, but then started having increased bleeding, and presented to ER for airway management and AC management. Admit to MICU. Plan discussed with patient as above. Bronchoscopy offered, opted for wait and watch approach. Cardiology team aware.
Zoe French is a 43-year-old woman with history of rheumatic heart disease with aortic and mitral valve replacements with [employer] prostheses in 1996 by Dr. Nirmala Adorno, a second surgery by Dr. Gary Valdes on 4/18/2018 that involved a re-operation on the aortic and mitral valves, a tricuspid and aortic root repair and a left atrial roof "enlargement" using a bovine pericardial patch. In February of 2022 she had a third operation by Dr. Radha Mejia from [facility] involving an aortic root replacement and a mechanical aortic valve (21mm [employer] prosthesis) along with a mechanical mitral valve replacement (ON-X 25/33 mm Conform-X) and reconstruction of the aortomitral curtain. Imaging recently showed an invasive lung mass with associated adenopathy, for which diagnostic bronchoscopic biopsy was performed on 3/19/2024. Warfarin was interrupted for the procedure and she used enoxaparin for bridging. After the procedure, she resumed Lovenox and warfarin. She had scant bleeding for 3 days after the procedure, but then on 3/22 began to cough up a large amount of bloody sputum. INR was ~ 1.7 at the time and she was using lovenox. She stopped lovenox and presented for evaluation. Currently, only a small amount of blood is visible in the expectorant.    Anticoagulation will be resumed and she will be monitored closely for recurrent bleeding on therapeutic anticoagulation.  If this occurs, another procedure for cautery may required, so will use unfractionated heparin initially.  If bleeding is contained on therapeutic heparin, please transition to enoxaparin to complete bridge to therapeutic INR.    Can target a lower therapeutic INR of 2-3 temporarily to allow adequate healing from the biopsy. The ON-X prosthesis in the mitral position can be managed with a lower INR and less intensive anticoagulation is acceptable for the mechanical valve in the aortic position, especially on a temporary basis.    Biopsy pathology of the lung nodule is currently pending.    ADONAY Osborne

## 2024-03-25 NOTE — PROGRESS NOTE ADULT - ATTENDING COMMENTS
patient seen and examined  labs and vitals reviewed  agree with above assessment and plan  given mechanical valves, unable to old a/c  cont supportive care per icu team  will follow

## 2024-03-25 NOTE — PROGRESS NOTE ADULT - ASSESSMENT
Patient is a 44yo woman with a medical history of rheumatic heart disease (s/p aortic-mitral valve replacements, pulm nodule s/p bronschopy (3/18), atrial flutter s/p CTI ablation 7/2023 who presented to the ED with hemoptysis and now admitted to the MICU with left sided segmental PE and now on heparin drip.     =====Neurologic=====  Patient is AOx4  Mentating well with no concerns at the moment     =====Cardiovascular=====  #Pulmonary Embolism   PE on CT scan in lieu of hemoptysis, cardiology following. No signs of R heart strain on CT, need to rule out with TTE. PESI score of 73: class two low risk.   - Start heparin drip w/out bolus  - TTE to definitely rule out right heart strain  - FU venous ultrasound lower extremity   - Appreciate cardiology/pulm and other consultant     #Mechanical Valves   Hx of severe mitral and moderate aortic regurgitation) with prior [facility] aortic and mitral valve replacements in 1996 w/ multiple revisions. Takes warfarin 12.5 daily iso of their atrial flutter.   - FU TTE and other cardio recs as above    =====Pulmonary=====  #PulmonaryMalignancy  Patient received bronchoscopy by Dr. Green on 3/18, Sample taken from lower left lobe and lymphadenopathy. Patient results came back for malignant cells in all region. CT 3/24 also noted some bone changes. potentially pointing towards metastatic disease.   - Onc consult after resolution of acute concerns  - FU pulm recs.   - Dr. Adorno consulted for possible bronch    =====GI=====  No present GI concerns, tolerating a regular kosher Diet     =====Renal/=====  No present Renal/ concerns     =====Infectious Disease=====  #StrepPyogenesProphylaxis  Patient's children tested positive for strep pyogens. Due to patient's history of rheumatic disease she was placed on prophylactic amoxicillin Recent labs came back for negative strep.   Plan  - CTM     =====Endocrine=====  No concerns presently.     =====Heme/Onc=====  - DVT PPX: Heparin drip for full AC   - Heme consulted for input on new pulm malignancy and possible underlying bleeding disorder   given pt [facility]    =====MICUGeneral=====  Lines:  Left wrist peripheral  Drips:  Heparin  IVF : NA  O2:  Satting well on room air  AB: NA  Diet: Regular Kosher     Pain: NA  DVT: Heparin Drip, holding patient's home warfarin

## 2024-03-25 NOTE — CONSULT NOTE ADULT - SUBJECTIVE AND OBJECTIVE BOX
HPI:  Patient is a 42yo woman with a medical history of rheumatic heart disease (s/p aortic-mitral valve replacements), recently diagnosed pulm malignancy ( bronchoscopy 3/18), atrial flutter s/p CTI ablation 2023 who presented to the ED with hemoptysis and now admitted to the MICU with left sided segmental PE      Patient reported symptoms starting at night after her bronchoscopy and progressively getting worse the next 3 days. She was on a Lovenox bridge and warfarin but due to the bleeding she had held it for the past few days. Due to her symptoms not resolving she decided to call her pulmonologist who recommended coming in here. She denies any other symptoms like chest pain, shortness of breath, confusion, swelling in her lower extremities. She denies any potential sources of bleed.     Vitals in the ED were unremarkable for the patient.  Labs were noteworthy for HGH 9.6, coags elevated to 16.7/1.51/40.8, mild transaminase 109/72, CT angio was noted for left lower lobe filling defect, prominent pulm artery, left lower lobe consolidation.    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 PCP is Dr. Kinsey, her primary pulmonologist is Dr. Stanford, She follows Dr. Jay Lisker for cardiology. Her kids recently tested positive for strep throat and she has been on amoxicillin for prophylaxis.  (24 Mar 2024 11:53)      PAST MEDICAL & SURGICAL HISTORY:  Rheumatic Disease of Heart Valve  Age 16    Endocarditis    Splenomegaly    Aortic Valve Replaced    Mitral Valve Stenosis and Aortic Valve Insufficiency     Section  @ 41wks secondary to fetal distress  8lbs, 9oz  Complicated by post-operative hemorrhage requiring transfusion     Section  @ 35wks secondary to formation of hematoma while on anticoagulation  6lbs, 7oz  Complicated by post-operative infection    H/O mitral valve replacement      Allergies  No Known Allergies    Intolerances        MEDICATIONS  (STANDING):  cefTRIAXone   IVPB 1000 milliGRAM(s) IV Intermittent every 24 hours  heparin  Infusion. 1200 Unit(s)/Hr (12 mL/Hr) IV Continuous <Continuous>    MEDICATIONS  (PRN):      FAMILY HISTORY:      SOCIAL HISTORY: No EtOH, no tobacco    REVIEW OF SYSTEMS:    CONSTITUTIONAL: No weakness, fevers or chills  EYES/ENT: No visual changes;  No vertigo or throat pain   NECK: No pain or stiffness  RESPIRATORY: No cough, wheezing, hemoptysis; No shortness of breath  CARDIOVASCULAR: No chest pain or palpitations  GASTROINTESTINAL: No abdominal or epigastric pain. No nausea, vomiting, or hematemesis; No diarrhea or constipation. No melena or hematochezia.  GENITOURINARY: No dysuria, frequency or hematuria  NEUROLOGICAL: No numbness or weakness  SKIN: No itching, burning, rashes, or lesions   All other review of systems is negative unless indicated above.        T(F): 97.4 (24 @ 12:00), Max: 98.6 (24 @ 20:00)  HR: 83 (24 @ 12:00)  BP: 97/56 (24 @ 12:00)  RR: 16 (24 @ 12:00)  SpO2: 97% (24 @ 12:00)  Wt(kg): --    GENERAL: NAD, well-developed  HEAD:  Atraumatic, Normocephalic  EYES: EOMI, PERRLA, conjunctiva and sclera clear  NECK: Supple, No JVD  CHEST/LUNG: Clear to auscultation bilaterally; No wheeze  HEART: Regular rate and rhythm; No murmurs, rubs, or gallops  ABDOMEN: Soft, Nontender, Nondistended; Bowel sounds present  EXTREMITIES:  2+ Peripheral Pulses, No clubbing, cyanosis, or edema  NEUROLOGY: non-focal  SKIN: No rashes or lesions                          9.3    5.86  )-----------( 176      ( 25 Mar 2024 05:45 )             28.0           137  |  101  |  6<L>  ----------------------------<  106<H>  3.8   |  24  |  0.38<L>    Ca    8.9      25 Mar 2024 05:45  Phos  3.6       Mg     1.70         TPro  7.1  /  Alb  4.1  /  TBili  0.8  /  DBili  x   /  AST  62<H>  /  ALT  95<H>  /  AlkPhos  60        Magnesium: 1.70 mg/dL ( @ 05:45)  Phosphorus: 3.6 mg/dL ( @ 05:45)      PT/INR - ( 25 Mar 2024 05:45 )   PT: 17.4 sec;   INR: 1.56 ratio         PTT - ( 25 Mar 2024 12:25 )  PTT:86.5 sec    .Bronchial left lower lobe   @ 15:30   No growth  --    Rare polymorphonuclear leukocytes seen per low power field  No Squamous epithelial cells seen per low power field  No organisms seen per oil power field    < from: CT Angio Chest PE Protocol w/ IV Cont (24 @ 04:50) >  IMPRESSION:    Tiny filling defect in the subsegmental branch of the left lower lobe   pulmonary artery (273:302), compatible with pulmonary embolism.   Suboptimal visualization of the distal subsegmental branch leading to   mass, query encased by mass or thrombosed. No central pulmonary embolism.    Prominent main pulmonary artery. Recommend clinical correlation to assess   for pulmonary arterial hypertension.    Left lower lobe bronchus appears obliterated containing soft tissue   density leading to the left lower lobe mass.    Poorly defined left lower lobe consolidative mass encasing adjacent   vessels and left lower lobe bronchus, concerning for neoplasm or   metastatic disease. Endobronchial extension of malignancy considered.   Correlate with findings of recently performed bronchoscopy.    Postoperative changes of aortic and mitral valvuloplasty. Fluid or   hematoma at the level of proximal ascending thoracic aorta with post   stenotic dilatation.    Findings were discussed with Dr. Perez  3/24/2024 5:23 AM by Dr. Hernandez   with readback confirmation.    --- End of Report ---    < end of copied text >     HPI:  Patient is a 44yo woman with a medical history of rheumatic heart disease (s/p aortic-mitral valve replacements), recently diagnosed pulm malignancy ( bronchoscopy 3/18), atrial flutter s/p CTI ablation 2023 who presented to the ED with hemoptysis and now admitted to the MICU with left sided segmental PE      Patient reported symptoms starting at night after her bronchoscopy and progressively getting worse the next 3 days. She was on a Lovenox bridge and warfarin but due to the bleeding she had held it for the past few days. Due to her symptoms not resolving she decided to call her pulmonologist who recommended coming in here. She denies any other symptoms like chest pain, shortness of breath, confusion, swelling in her lower extremities. She denies any potential sources of bleed.     Vitals in the ED were unremarkable for the patient.  Labs were noteworthy for HGH 9.6, coags elevated to 16.7/1.51/40.8, mild transaminase 109/72, CT angio was noted for left lower lobe filling defect, prominent pulm artery, left lower lobe consolidation.    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 Her PCP is Dr. Kinsey, her primary pulmonologist is Dr. Stanford, She follows Dr. Jay Lisker for cardiology. Her kids recently tested positive for strep throat and she has been on amoxicillin for prophylaxis.  (24 Mar 2024 11:53)      PAST MEDICAL & SURGICAL HISTORY:  Rheumatic Disease of Heart Valve  Age 16    Endocarditis    Splenomegaly    Aortic Valve Replaced    Mitral Valve Stenosis and Aortic Valve Insufficiency     Section  @ 41wks secondary to fetal distress  8lbs, 9oz  Complicated by post-operative hemorrhage requiring transfusion     Section  @ 35wks secondary to formation of hematoma while on anticoagulation  6lbs, 7oz  Complicated by post-operative infection    H/O mitral valve replacement      Allergies  No Known Allergies    Intolerances        MEDICATIONS  (STANDING):  cefTRIAXone   IVPB 1000 milliGRAM(s) IV Intermittent every 24 hours  heparin  Infusion. 1200 Unit(s)/Hr (12 mL/Hr) IV Continuous <Continuous>    MEDICATIONS  (PRN):      FAMILY HISTORY:      SOCIAL HISTORY: No EtOH, no tobacco    REVIEW OF SYSTEMS:    CONSTITUTIONAL: No weakness, fevers or chills  EYES/ENT: No visual changes;  No vertigo or throat pain   NECK: No pain or stiffness  RESPIRATORY: +hemoptysis; No shortness of breath  CARDIOVASCULAR: No chest pain or palpitations  GASTROINTESTINAL: No abdominal or epigastric pain. No nausea, vomiting, or hematemesis; No diarrhea or constipation. No melena or hematochezia.  GENITOURINARY: No dysuria, frequency or hematuria  NEUROLOGICAL: No numbness or weakness  SKIN: No itching, burning, rashes, or lesions   All other review of systems is negative unless indicated above.        T(F): 97.4 (24 @ 12:00), Max: 98.6 (24 @ 20:00)  HR: 83 (24 @ 12:00)  BP: 97/56 (24 @ 12:00)  RR: 16 (24 @ 12:00)  SpO2: 97% (24 @ 12:00)  Wt(kg): --    GENERAL: NAD, well-developed  HEAD:  Atraumatic, Normocephalic  EYES: EOMI, PERRLA, conjunctiva and sclera clear  NECK: Supple, No JVD  CHEST/LUNG: Clear to auscultation bilaterally; No wheeze  HEART: Regular rate and rhythm; No murmurs, rubs, or gallops  ABDOMEN: Soft, Nontender, Nondistended; Bowel sounds present  EXTREMITIES:  2+ Peripheral Pulses, No clubbing, cyanosis, or edema  NEUROLOGY: non-focal  SKIN: No rashes or lesions                          9.3    5.86  )-----------( 176      ( 25 Mar 2024 05:45 )             28.0           137  |  101  |  6<L>  ----------------------------<  106<H>  3.8   |  24  |  0.38<L>    Ca    8.9      25 Mar 2024 05:45  Phos  3.6       Mg     1.70         TPro  7.1  /  Alb  4.1  /  TBili  0.8  /  DBili  x   /  AST  62<H>  /  ALT  95<H>  /  AlkPhos  60        Magnesium: 1.70 mg/dL ( @ 05:45)  Phosphorus: 3.6 mg/dL ( @ 05:45)      PT/INR - ( 25 Mar 2024 05:45 )   PT: 17.4 sec;   INR: 1.56 ratio         PTT - ( 25 Mar 2024 12:25 )  PTT:86.5 sec    .Bronchial left lower lobe  03-19 @ 15:30   No growth  --    Rare polymorphonuclear leukocytes seen per low power field  No Squamous epithelial cells seen per low power field  No organisms seen per oil power field    < from: CT Angio Chest PE Protocol w/ IV Cont (24 @ 04:50) >  IMPRESSION:    Tiny filling defect in the subsegmental branch of the left lower lobe   pulmonary artery (273:302), compatible with pulmonary embolism.   Suboptimal visualization of the distal subsegmental branch leading to   mass, query encased by mass or thrombosed. No central pulmonary embolism.    Prominent main pulmonary artery. Recommend clinical correlation to assess   for pulmonary arterial hypertension.    Left lower lobe bronchus appears obliterated containing soft tissue   density leading to the left lower lobe mass.    Poorly defined left lower lobe consolidative mass encasing adjacent   vessels and left lower lobe bronchus, concerning for neoplasm or   metastatic disease. Endobronchial extension of malignancy considered.   Correlate with findings of recently performed bronchoscopy.    Postoperative changes of aortic and mitral valvuloplasty. Fluid or   hematoma at the level of proximal ascending thoracic aorta with post   stenotic dilatation.    Findings were discussed with Dr. Perez  3/24/2024 5:23 AM by Dr. Hernandez   with readback confirmation.    --- End of Report ---    < end of copied text >

## 2024-03-25 NOTE — CONSULT NOTE ADULT - ASSESSMENT
43F with PMH of rheumatic heart disease (s/p aortic-mitral valve replacements, pulm nodule s/p bronschopy (3/18), atrial flutter s/p CTI ablation 7/2023 who presented to the ED with hemoptysis and now admitted to the MICU with left sided segmental PE and now on heparin drip. Hematology consulted for hemoptysis in the setting of lung mass and PE.    #Hemoptysis  #Lung Mass  #PE  - CTA chest 3/24 showed LLL PE, prominent main pulmonary artery, poorly defined LLL consolidative mass encasing adjacent vessels and LLL bronchus, LLL bronchus appears obliterated containing soft tissue density leading to LLL mass. 43F with PMH of rheumatic heart disease (s/p aortic-mitral valve replacements, pulm nodule s/p bronschopy (3/18), atrial flutter s/p CTI ablation 7/2023 who presented to the ED with hemoptysis and now admitted to the MICU with left sided segmental PE. Hematology consulted for hemoptysis in the setting of lung mass and PE.    #Hemoptysis  #Lung Mass  #PE  - Reports one week history of hemoptysis prior to admission, in the setting of LLL mass  - CTA chest 3/24 showed LLL PE, prominent main pulmonary artery, poorly defined LLL consolidative mass encasing adjacent vessels and LLL bronchus, LLL bronchus appears obliterated containing soft tissue density leading to LLL mass.  - hgb=9.3, hemodynamically stable  - s/p left lower lobe lung mass biopsy 3/19/24: positive for malignant neoplasm, pending further classification   - Appreciate Interventional Pulmonology recs - after discussion regarding risks/benefits of bronchoscopy, patient opted for monitoring bleed while on anticoagulation rather than bronchoscopy at this time  - started on heparin infusion, with modified aPTT goal 55-70  - Patient reports no new hemoptysis today    Recommend:  - continue with heparin infusion now, agree with modified aPTT goal 55-70 given recent hemoptysis  - Plan to bridge to warfarin (INR goal 2-3) when bleeding is stable per primary team/interventional pulm   - Closely monitor airway, If patient experiences persisting hemoptysis would appreciate interventional pulmonology evaluation for role of bronchoscopy  - avoid using amicar/TXA at this time to avoid associated risk of thrombosis (valvular or other)  - follow up left lower lobe lung mass biopsy 3/19/24 to classify malignancy - pending malignancy type may benefit from Rad Onc evaluation  - Hematology will follow    Case discussed w/ Dr. Tre Bergman, PGY-4  Fellow Hematology/Oncology  pager [phone]  Available on TEAMS  After 5pm or on weekends please contact  to page on-call fellow

## 2024-03-25 NOTE — PROGRESS NOTE ADULT - ASSESSMENT
43 year old woman with rheumatic heart disease s/p mechanical AVR and MVR in 1996 that required re-op in 2018, (with revision in 2022 for dehiscence) on Warfarin, atrial flutter s/p CTI ablation 7/2023 who presents with hemoptysis following recent bronchoscopic biopsy of new lung mass. Cardiology consulted for AC recs. Due to mechanical valves and incidental likely PE found on CT Chest, patient needs anticoagulation, which should only be held in the presence of a life threatening bleed. Discussed risks of AC with patient including worsening airway bleed and need for emergent intubation, which patient understands.    Recommendations:  - continue heparin drip, when PTT therapeutic and if no planned procedures can reintroduce home Warfarin with INR goal 2-3  - Monitor for recurrent bleeding on therapeutic anticoagulation, if uncontrolled to consider endobronchial cauterization. This decision per interventional pulmonology  - Obtain LE Dopplers to RO DVT  - Close airway monitoring while on AC  - Appreciate excellent care per MICU/Interventional Pulmonology teams    Ozzy Anaya MD  Cardiology Fellow  All Cardiology service information can be found 24/7 on [email or web address], password: [email or web address]     ***Note not finalized until co-signed by attending***

## 2024-03-26 LAB
ALBUMIN SERPL ELPH-MCNC: 4 G/DL — SIGNIFICANT CHANGE UP (ref 3.3–5)
ALP SERPL-CCNC: 59 U/L — SIGNIFICANT CHANGE UP (ref 40–120)
ALT FLD-CCNC: 75 U/L — HIGH (ref 4–33)
ANION GAP SERPL CALC-SCNC: 13 MMOL/L — SIGNIFICANT CHANGE UP (ref 7–14)
APTT BLD: 30.6 SEC — SIGNIFICANT CHANGE UP (ref 24.5–35.6)
APTT BLD: 51.8 SEC — HIGH (ref 24.5–35.6)
AST SERPL-CCNC: 52 U/L — HIGH (ref 4–32)
BASOPHILS # BLD AUTO: 0.05 K/UL — SIGNIFICANT CHANGE UP (ref 0–0.2)
BASOPHILS NFR BLD AUTO: 1.1 % — SIGNIFICANT CHANGE UP (ref 0–2)
BILIRUB SERPL-MCNC: 0.7 MG/DL — SIGNIFICANT CHANGE UP (ref 0.2–1.2)
BUN SERPL-MCNC: 10 MG/DL — SIGNIFICANT CHANGE UP (ref 7–23)
CALCIUM SERPL-MCNC: 9 MG/DL — SIGNIFICANT CHANGE UP (ref 8.4–10.5)
CHLORIDE SERPL-SCNC: 103 MMOL/L — SIGNIFICANT CHANGE UP (ref 98–107)
CO2 SERPL-SCNC: 22 MMOL/L — SIGNIFICANT CHANGE UP (ref 22–31)
CREAT SERPL-MCNC: 0.44 MG/DL — LOW (ref 0.5–1.3)
EGFR: 123 ML/MIN/1.73M2 — SIGNIFICANT CHANGE UP
EOSINOPHIL # BLD AUTO: 0.38 K/UL — SIGNIFICANT CHANGE UP (ref 0–0.5)
EOSINOPHIL NFR BLD AUTO: 8.2 % — HIGH (ref 0–6)
GLUCOSE SERPL-MCNC: 98 MG/DL — SIGNIFICANT CHANGE UP (ref 70–99)
HCG SERPL-ACNC: <1 MIU/ML — SIGNIFICANT CHANGE UP
HCT VFR BLD CALC: 27.4 % — LOW (ref 34.5–45)
HGB BLD-MCNC: 8.9 G/DL — LOW (ref 11.5–15.5)
IANC: 2.42 K/UL — SIGNIFICANT CHANGE UP (ref 1.8–7.4)
IMM GRANULOCYTES NFR BLD AUTO: 0.4 % — SIGNIFICANT CHANGE UP (ref 0–0.9)
INR BLD: 1.36 RATIO — HIGH (ref 0.85–1.18)
LYMPHOCYTES # BLD AUTO: 1.39 K/UL — SIGNIFICANT CHANGE UP (ref 1–3.3)
LYMPHOCYTES # BLD AUTO: 29.9 % — SIGNIFICANT CHANGE UP (ref 13–44)
MAGNESIUM SERPL-MCNC: 1.7 MG/DL — SIGNIFICANT CHANGE UP (ref 1.6–2.6)
MCHC RBC-ENTMCNC: 30.1 PG — SIGNIFICANT CHANGE UP (ref 27–34)
MCHC RBC-ENTMCNC: 32.5 GM/DL — SIGNIFICANT CHANGE UP (ref 32–36)
MCV RBC AUTO: 92.6 FL — SIGNIFICANT CHANGE UP (ref 80–100)
MONOCYTES # BLD AUTO: 0.39 K/UL — SIGNIFICANT CHANGE UP (ref 0–0.9)
MONOCYTES NFR BLD AUTO: 8.4 % — SIGNIFICANT CHANGE UP (ref 2–14)
NEUTROPHILS # BLD AUTO: 2.42 K/UL — SIGNIFICANT CHANGE UP (ref 1.8–7.4)
NEUTROPHILS NFR BLD AUTO: 52 % — SIGNIFICANT CHANGE UP (ref 43–77)
NRBC # BLD: 0 /100 WBCS — SIGNIFICANT CHANGE UP (ref 0–0)
NRBC # FLD: 0 K/UL — SIGNIFICANT CHANGE UP (ref 0–0)
PHOSPHATE SERPL-MCNC: 3.5 MG/DL — SIGNIFICANT CHANGE UP (ref 2.5–4.5)
PLATELET # BLD AUTO: 180 K/UL — SIGNIFICANT CHANGE UP (ref 150–400)
POTASSIUM SERPL-MCNC: 4.2 MMOL/L — SIGNIFICANT CHANGE UP (ref 3.5–5.3)
POTASSIUM SERPL-SCNC: 4.2 MMOL/L — SIGNIFICANT CHANGE UP (ref 3.5–5.3)
PROT SERPL-MCNC: 6.7 G/DL — SIGNIFICANT CHANGE UP (ref 6–8.3)
PROTHROM AB SERPL-ACNC: 15.1 SEC — HIGH (ref 9.5–13)
RBC # BLD: 2.96 M/UL — LOW (ref 3.8–5.2)
RBC # FLD: 16.2 % — HIGH (ref 10.3–14.5)
SODIUM SERPL-SCNC: 138 MMOL/L — SIGNIFICANT CHANGE UP (ref 135–145)
WBC # BLD: 4.65 K/UL — SIGNIFICANT CHANGE UP (ref 3.8–10.5)
WBC # FLD AUTO: 4.65 K/UL — SIGNIFICANT CHANGE UP (ref 3.8–10.5)

## 2024-03-26 PROCEDURE — 99233 SBSQ HOSP IP/OBS HIGH 50: CPT

## 2024-03-26 PROCEDURE — 31645 BRNCHSC W/THER ASPIR 1ST: CPT | Mod: GC

## 2024-03-26 PROCEDURE — 31641 BRONCHOSCOPY TREAT BLOCKAGE: CPT | Mod: GC

## 2024-03-26 PROCEDURE — 99291 CRITICAL CARE FIRST HOUR: CPT | Mod: GC,25

## 2024-03-26 PROCEDURE — 31635 BRONCHOSCOPY W/FB REMOVAL: CPT | Mod: GC

## 2024-03-26 DEVICE — PROBE CRYO FLEX 1.7X1150MM SNGL USE: Type: IMPLANTABLE DEVICE | Status: FUNCTIONAL

## 2024-03-26 DEVICE — CATH FOGARTY 5FR X 80CM: Type: IMPLANTABLE DEVICE | Status: FUNCTIONAL

## 2024-03-26 RX ORDER — MAGNESIUM SULFATE 500 MG/ML
2 VIAL (ML) INJECTION ONCE
Refills: 0 | Status: COMPLETED | OUTPATIENT
Start: 2024-03-26 | End: 2024-03-26

## 2024-03-26 RX ORDER — HEPARIN SODIUM 5000 [USP'U]/ML
1100 INJECTION INTRAVENOUS; SUBCUTANEOUS
Qty: 25000 | Refills: 0 | Status: DISCONTINUED | OUTPATIENT
Start: 2024-03-26 | End: 2024-03-26

## 2024-03-26 RX ORDER — ONDANSETRON 8 MG/1
4 TABLET, FILM COATED ORAL EVERY 6 HOURS
Refills: 0 | Status: DISCONTINUED | OUTPATIENT
Start: 2024-03-26 | End: 2024-03-27

## 2024-03-26 RX ADMIN — Medication 25 GRAM(S): at 10:26

## 2024-03-26 RX ADMIN — CHLORHEXIDINE GLUCONATE 1 APPLICATION(S): 213 SOLUTION TOPICAL at 13:05

## 2024-03-26 RX ADMIN — HEPARIN SODIUM 1100 UNIT(S)/HR: 5000 INJECTION INTRAVENOUS; SUBCUTANEOUS at 08:44

## 2024-03-26 RX ADMIN — ONDANSETRON 4 MILLIGRAM(S): 8 TABLET, FILM COATED ORAL at 21:55

## 2024-03-26 RX ADMIN — HEPARIN SODIUM 11 UNIT(S)/HR: 5000 INJECTION INTRAVENOUS; SUBCUTANEOUS at 04:02

## 2024-03-26 NOTE — DIETITIAN INITIAL EVALUATION ADULT - PERTINENT LABORATORY DATA
03-26    138  |  103  |  10  ----------------------------<  98  4.2   |  22  |  0.44<L>    Ca    9.0      26 Mar 2024 03:02  Phos  3.5     03-26  Mg     1.70     03-26    TPro  6.7  /  Alb  4.0  /  TBili  0.7  /  DBili  x   /  AST  52<H>  /  ALT  75<H>  /  AlkPhos  59  03-26

## 2024-03-26 NOTE — PROGRESS NOTE ADULT - ASSESSMENT
44YO Female never smokerPMH rheumatic heart disease (s/p mechanical aortic and mitral valve on warfarin), AFlutter s/p CTI ablation 7/2023 and LLL spiculated nodule with hilar lymphadenopathy s/p bx of LLL nodule, 4R, 4L and 11L nodules who was admitted 3/24 for hemoptysis. Found to have CT chest notable for subsegemtnal  PE and consolidative mass encasing adjacent vessels and left lower lobe bronchus, concerning for neoplasm or metastatic disease. Interventional pulmonology consulted for hemoptysis.     #Hemoptysis  - Given need for AC in the setting of mechanical valves, had extensive discussion with pt and  regarding bronchoscopy to localize bleeding and potentially intervene. Explained risk of potentially life threatening bleeding when on anticoagulation and possible requirement for emergent intubation and bronchoscopy. She and her  acknowledged risk however opted for monitoring for bleeding while on anticoagulation  - Would start heparin gtt without bolus and monitor in MICU for further bleeding  - Hold warfarin for now and if no bleeding in 24hrs can restart bridge  - Please keep cup at bedside to monitor  - If pt requires emergent intubation would intubate with 8.0 tube and call interventional pulmonology  - Agree with starting ceftriaxone for now while waiting for strep swab to result  - F/u cytopath for primary diagnosis for malignancy (informed pt of preliminary findings)  - Plan for Bronchoscopy today.     Case discussed with Dr. Green 44YO Female never smokerPMH rheumatic heart disease (s/p mechanical aortic and mitral valve on warfarin), AFlutter s/p CTI ablation 7/2023 and LLL spiculated nodule with hilar lymphadenopathy s/p bx of LLL nodule, 4R, 4L and 11L nodules who was admitted 3/24 for hemoptysis. Found to have CT chest notable for subsegemtnal  PE and consolidative mass encasing adjacent vessels and left lower lobe bronchus, concerning for neoplasm or metastatic disease. Interventional pulmonology consulted for hemoptysis.     #Hemoptysis  - Given need for AC in the setting of mechanical valves, had extensive discussion with pt and  regarding bronchoscopy to localize bleeding and potentially intervene. Explained risk of potentially life threatening bleeding when on anticoagulation and possible requirement for emergent intubation and bronchoscopy. She and her  acknowledged risk however opted for monitoring for bleeding while on anticoagulation  - Would start heparin gtt without bolus and monitor in MICU for further bleeding  - Hold warfarin for now and if no bleeding in 24hrs can restart bridge  - Please keep cup at bedside to monitor  - If pt requires emergent intubation would intubate with 8.0 tube and call interventional pulmonology  - Agree with starting ceftriaxone for now while waiting for strep swab to result  - F/u cytopath for primary diagnosis for malignancy (informed pt of preliminary findings)  - Plan for Bronchoscopy today.   - Case discussed with pt's cardiologist Dr. Jay Lisker - okay to hold anticoagulation for 24 hours in the setting of her hemoptysis. To be reevaluated tomorrow.     Case discussed with Dr. Green 42YO Female never smokerPMH rheumatic heart disease (s/p mechanical aortic and mitral valve on warfarin), AFlutter s/p CTI ablation 7/2023 and LLL spiculated nodule with hilar lymphadenopathy s/p bx of LLL nodule, 4R, 4L and 11L nodules who was admitted 3/24 for hemoptysis. Found to have CT chest notable for subsegemtnal  PE and consolidative mass encasing adjacent vessels and left lower lobe bronchus, concerning for neoplasm or metastatic disease. Interventional pulmonology consulted for hemoptysis.     #Hemoptysis  - Given need for AC in the setting of mechanical valves, had extensive discussion with pt and  regarding bronchoscopy to localize bleeding and potentially intervene. Explained risk of potentially life threatening bleeding when on anticoagulation and possible requirement for emergent intubation and bronchoscopy. She and her  acknowledged risk however opted for monitoring for bleeding while on anticoagulation      - Please keep cup at bedside to monitor  - If pt requires emergent intubation would intubate with 8.0 tube and call interventional pulmonology  - Agree with starting ceftriaxone for now while waiting for strep swab to result  - F/u cytopath for primary diagnosis for malignancy (informed pt of preliminary findings)    - Underwent repeat bronchoscopy, with old clots removed. Bleeding noted from LLL tumor. Treated with medications and the endobronchial area of oozing was ablated with APC.   - Case discussed with pt's cardiologist Dr. Jay Lisker - recommended to hold anticoagulation for 24 hours in the setting of her hemoptysis and active bleeding from LLL. To be reevaluated tomorrow.   - Monitor closely in MICU.     Case discussed with Dr. Green

## 2024-03-26 NOTE — DIETITIAN INITIAL EVALUATION ADULT - NS FNS DIET ORDER
Diet, NPO after Midnight:      NPO Start Date: 25-Mar-2024,   NPO Start Time: 23:59 (03-25-24 @ 16:56)

## 2024-03-26 NOTE — PROGRESS NOTE ADULT - ASSESSMENT
43 year old woman with rheumatic heart disease s/p mechanical AVR and MVR in 1996 that required re-op in 2018, (with revision in 2022 for dehiscence) on Warfarin, atrial flutter s/p CTI ablation 7/2023 who presents with hemoptysis following recent bronchoscopic biopsy of new lung mass. Cardiology consulted for AC recs. Due to mechanical valves and incidental likely PE found on CT Chest, patient needs anticoagulation, which should only be held in the presence of a life threatening bleed. Discussed risks of AC with patient including worsening airway bleed and need for emergent intubation, which patient understands.    Recommendations:  - continue heparin drip, when PTT therapeutic and if no planned procedures can reintroduce home Warfarin with INR goal 2-3. Discussed with patient's cardiologist, Dr. Jay Lisker, who is aware of patient's current hospitalization. If necessary to stop bleeding, it is acceptable to hold AC for up to one day.  - Planning for bronchoscopy later today  - Monitor for recurrent bleeding on therapeutic anticoagulation, if uncontrolled to consider endobronchial cauterization.   - Obtain LE Dopplers to RO DVT  - Close airway monitoring while on AC  - Appreciate excellent care per MICU/Interventional Pulmonology teams    Ozzy Anaya MD  Cardiology Fellow  All Cardiology service information can be found 24/7 on [email or web address], password: [email or web address]     ***Note not finalized until co-signed by attending***

## 2024-03-26 NOTE — PROGRESS NOTE ADULT - ATTENDING COMMENTS
42yo woman with a medical history of rheumatic heart disease (s/p aortic-mitral valve replacements, pulm nodule s/p bronchoscopy (3/18), atrial flutter s/p CTI ablation 7/2023 who presented to the ED with hemoptysis and now admitted to the MICU with left sided segmental PE and now on heparin drip.     s/p bronch 3/26 with some bleeding noted from cancer    # acute hemoptysis  # L segmental PE  # mechanical valve  - discussed with IP and Dr. Jay Lisker (patient's outpatient cardiologist); plan was to continue to hold heparin gtt until tonight  - will decide on bridging versus just resuming coumadin tonight 44yo woman with a medical history of rheumatic heart disease (s/p aortic-mitral valve replacements, pulm nodule s/p bronchoscopy (3/18), atrial flutter s/p CTI ablation 7/2023 who presented to the ED with hemoptysis and now admitted to the MICU with left sided segmental PE and now on heparin drip.     plan for bronch today    # acute hemoptysis  # L segmental PE  # mechanical valve  - holding heparin gtt for now pending bronch  - hold txa given risk for thrombosis  - c/w ceftriaxone pending pcr for strep

## 2024-03-26 NOTE — PROGRESS NOTE ADULT - ASSESSMENT
Patient is a 44yo woman with a medical history of rheumatic heart disease (s/p aortic-mitral valve replacements, pulm nodule s/p bronschopy (3/18), atrial flutter s/p CTI ablation 7/2023 who presented to the ED with hemoptysis and now admitted to the MICU with left sided segmental PE and now on heparin drip.     =====Neurologic=====  Patient is AOx4  Mentating well with no concerns at the moment     =====Cardiovascular=====  #Pulmonary Embolism   PE on CT, cardiology following. No signs of R heart strain on CT, PESI score of 73: class two low risk.   - Start heparin drip w/out bolus  - no right heart strain on TTE  - Doppler  - Appreciate cardiology/pulm recs    #Mechanical Valves   Hx of severe mitral and moderate aortic regurgitation) with prior [place] aortic and mitral valve replacements in 1996 w/ multiple revisions. Takes warfarin 12.5 daily iso of their atrial flutter.   - TTE 3/24  - On Heparin titrating down in lieu of hemoptysis    =====Pulmonary=====  #PulmonaryMalignancy  Patient received bronchoscopy by Dr. Green on 3/18, Sample taken from lower left lobe and lymphadenopathy. Patient results came back for malignant cells in all region. CT 3/24 also noted some bone changes; potentially pointing towards metastatic disease. Pt experiencing hemoptysis likely 2/2 lung lesion.   - Onc consult after resolution of acute concerns  - FU pulm/heme recs  - Bronch 3/26  - Dr. Adorno following    =====GI=====  No present GI concerns, tolerating a regular kosher Diet     =====Renal/=====  No present Renal/ concerns     =====Infectious Disease=====  #StrepPyogenesProphylaxis  Patient's children tested positive for strep pyogens. Due to patient's history of rheumatic disease she was placed on prophylactic amoxicillin. Recent labs came back for negative strep.   Plan  - CTM     =====Endocrine=====  No concerns presently.     =====Heme/Onc=====  - DVT PPX: Heparin   - Heme consulted for input on new pulm malignancy and possible underlying bleeding disorder   given pt Hx of averse bleeding even prior to mech valve placement  - must c/w ac given mech valves (holding for tentative bronchoscopy)  - Heme following  - On Hep full ac given mech valves; titrating down in lieu of hemoptysis     =====MICUGeneral=====  Lines:  Left wrist peripheral  Drips:  Heparin  IVF : NA  O2:  Satting well on room air  AB: NA  Diet: Regular Kosher     Pain: NA  DVT: Heparin Drip, holding patient's home warfarin

## 2024-03-26 NOTE — PROGRESS NOTE ADULT - ATTENDING COMMENTS
patient seen and examined  labs and vitals reviewed  agree with above assessment and plan  plan discussed with MICU team, plan for bronch today

## 2024-03-26 NOTE — DIETITIAN INITIAL EVALUATION ADULT - ADD RECOMMEND
1. Provide encouragement with PO intake, menu selections, and assistance with meals as needed. Continue to monitor nutritional intake, labs, weights, BM, skin, clinical course. Follow pt as per protocol.

## 2024-03-26 NOTE — PROGRESS NOTE ADULT - ATTENDING COMMENTS
Agree with above. Critically ill patient with hemoptysis, newly diagnosed malignancy and history of mechanical valves with extensive cardiac surgery, requiring interventional pulmonology therapeutic intervention for bleeding from LLL.

## 2024-03-26 NOTE — DIETITIAN INITIAL EVALUATION ADULT - PERTINENT MEDS FT
MEDICATIONS  (STANDING):  chlorhexidine 2% Cloths 1 Application(s) Topical daily    MEDICATIONS  (PRN):

## 2024-03-26 NOTE — DIETITIAN INITIAL EVALUATION ADULT - OTHER INFO
42 y/o female with hx splenomegaly endocarditis and Rheumatic heart disease admitted with hemoptysis and found to have pulmonary malignancy. Visited with pt to obtain nutrition hx. Pt NPO today for procedure. She said she hasn't had an appetite after receiving news of her dx. No food preferences offered at this time. Stable wt trend reported PTA. Offered oral supplementation to patient, however she was not interested in options provided. No GI distress; last BM 3/26. Pt follows Kosher dietary restrictions and has been receiving Kosher packaged food items. Protein rich food sources reviewed and encouraged. Educated on ways to add calories and protein to standard meals. Family also bringing in foods from home to augment hospital diet. Encourage oral intake once off NPO restriction, as tolerated. RDN services to remain available as needed.

## 2024-03-27 ENCOUNTER — TRANSCRIPTION ENCOUNTER (OUTPATIENT)
Age: 44
End: 2024-03-27

## 2024-03-27 VITALS
RESPIRATION RATE: 18 BRPM | DIASTOLIC BLOOD PRESSURE: 54 MMHG | HEART RATE: 84 BPM | OXYGEN SATURATION: 100 % | SYSTOLIC BLOOD PRESSURE: 106 MMHG

## 2024-03-27 LAB
ALBUMIN SERPL ELPH-MCNC: 4.5 G/DL — SIGNIFICANT CHANGE UP (ref 3.3–5)
ALP SERPL-CCNC: 66 U/L — SIGNIFICANT CHANGE UP (ref 40–120)
ALT FLD-CCNC: 72 U/L — HIGH (ref 4–33)
ANION GAP SERPL CALC-SCNC: 12 MMOL/L — SIGNIFICANT CHANGE UP (ref 7–14)
AST SERPL-CCNC: 46 U/L — HIGH (ref 4–32)
BASE EXCESS BLDV CALC-SCNC: -0.4 MMOL/L — SIGNIFICANT CHANGE UP (ref -2–3)
BASOPHILS # BLD AUTO: 0.01 K/UL — SIGNIFICANT CHANGE UP (ref 0–0.2)
BASOPHILS NFR BLD AUTO: 0.2 % — SIGNIFICANT CHANGE UP (ref 0–2)
BILIRUB SERPL-MCNC: 1 MG/DL — SIGNIFICANT CHANGE UP (ref 0.2–1.2)
BLOOD GAS VENOUS COMPREHENSIVE RESULT: SIGNIFICANT CHANGE UP
BUN SERPL-MCNC: 9 MG/DL — SIGNIFICANT CHANGE UP (ref 7–23)
CALCIUM SERPL-MCNC: 9 MG/DL — SIGNIFICANT CHANGE UP (ref 8.4–10.5)
CHLORIDE BLDV-SCNC: 105 MMOL/L — SIGNIFICANT CHANGE UP (ref 96–108)
CHLORIDE SERPL-SCNC: 104 MMOL/L — SIGNIFICANT CHANGE UP (ref 98–107)
CO2 BLDV-SCNC: 25.3 MMOL/L — SIGNIFICANT CHANGE UP (ref 22–26)
CO2 SERPL-SCNC: 22 MMOL/L — SIGNIFICANT CHANGE UP (ref 22–31)
CREAT SERPL-MCNC: 0.46 MG/DL — LOW (ref 0.5–1.3)
EGFR: 122 ML/MIN/1.73M2 — SIGNIFICANT CHANGE UP
EOSINOPHIL # BLD AUTO: 0.02 K/UL — SIGNIFICANT CHANGE UP (ref 0–0.5)
EOSINOPHIL NFR BLD AUTO: 0.3 % — SIGNIFICANT CHANGE UP (ref 0–6)
GAS PNL BLDV: 135 MMOL/L — LOW (ref 136–145)
GAS PNL BLDV: SIGNIFICANT CHANGE UP
GLUCOSE BLDV-MCNC: 142 MG/DL — HIGH (ref 70–99)
GLUCOSE SERPL-MCNC: 150 MG/DL — HIGH (ref 70–99)
HCO3 BLDV-SCNC: 24 MMOL/L — SIGNIFICANT CHANGE UP (ref 22–29)
HCT VFR BLD CALC: 28.8 % — LOW (ref 34.5–45)
HCT VFR BLDA CALC: 30 % — LOW (ref 34.5–46.5)
HGB BLD CALC-MCNC: 9.9 G/DL — LOW (ref 11.7–16.1)
HGB BLD-MCNC: 9.3 G/DL — LOW (ref 11.5–15.5)
IANC: 5.81 K/UL — SIGNIFICANT CHANGE UP (ref 1.8–7.4)
IMM GRANULOCYTES NFR BLD AUTO: 0.3 % — SIGNIFICANT CHANGE UP (ref 0–0.9)
INR BLD: 1.16 RATIO — SIGNIFICANT CHANGE UP (ref 0.85–1.18)
LACTATE BLDV-MCNC: 1.8 MMOL/L — SIGNIFICANT CHANGE UP (ref 0.5–2)
LYMPHOCYTES # BLD AUTO: 0.36 K/UL — LOW (ref 1–3.3)
LYMPHOCYTES # BLD AUTO: 5.7 % — LOW (ref 13–44)
MAGNESIUM SERPL-MCNC: 1.9 MG/DL — SIGNIFICANT CHANGE UP (ref 1.6–2.6)
MCHC RBC-ENTMCNC: 29.6 PG — SIGNIFICANT CHANGE UP (ref 27–34)
MCHC RBC-ENTMCNC: 32.3 GM/DL — SIGNIFICANT CHANGE UP (ref 32–36)
MCV RBC AUTO: 91.7 FL — SIGNIFICANT CHANGE UP (ref 80–100)
MONOCYTES # BLD AUTO: 0.06 K/UL — SIGNIFICANT CHANGE UP (ref 0–0.9)
MONOCYTES NFR BLD AUTO: 1 % — LOW (ref 2–14)
NEUTROPHILS # BLD AUTO: 5.81 K/UL — SIGNIFICANT CHANGE UP (ref 1.8–7.4)
NEUTROPHILS NFR BLD AUTO: 92.5 % — HIGH (ref 43–77)
NRBC # BLD: 0 /100 WBCS — SIGNIFICANT CHANGE UP (ref 0–0)
NRBC # FLD: 0 K/UL — SIGNIFICANT CHANGE UP (ref 0–0)
PCO2 BLDV: 38 MMHG — LOW (ref 39–52)
PH BLDV: 7.41 — SIGNIFICANT CHANGE UP (ref 7.32–7.43)
PHOSPHATE SERPL-MCNC: 2.7 MG/DL — SIGNIFICANT CHANGE UP (ref 2.5–4.5)
PLATELET # BLD AUTO: 186 K/UL — SIGNIFICANT CHANGE UP (ref 150–400)
PO2 BLDV: 89 MMHG — HIGH (ref 25–45)
POTASSIUM BLDV-SCNC: 3.8 MMOL/L — SIGNIFICANT CHANGE UP (ref 3.5–5.1)
POTASSIUM SERPL-MCNC: 3.8 MMOL/L — SIGNIFICANT CHANGE UP (ref 3.5–5.3)
POTASSIUM SERPL-SCNC: 3.8 MMOL/L — SIGNIFICANT CHANGE UP (ref 3.5–5.3)
PROT SERPL-MCNC: 7.3 G/DL — SIGNIFICANT CHANGE UP (ref 6–8.3)
PROTHROM AB SERPL-ACNC: 12.9 SEC — SIGNIFICANT CHANGE UP (ref 9.5–13)
RBC # BLD: 3.14 M/UL — LOW (ref 3.8–5.2)
RBC # FLD: 16 % — HIGH (ref 10.3–14.5)
SAO2 % BLDV: 98.2 % — HIGH (ref 67–88)
SODIUM SERPL-SCNC: 138 MMOL/L — SIGNIFICANT CHANGE UP (ref 135–145)
WBC # BLD: 6.28 K/UL — SIGNIFICANT CHANGE UP (ref 3.8–10.5)
WBC # FLD AUTO: 6.28 K/UL — SIGNIFICANT CHANGE UP (ref 3.8–10.5)

## 2024-03-27 PROCEDURE — 99233 SBSQ HOSP IP/OBS HIGH 50: CPT | Mod: GC

## 2024-03-27 PROCEDURE — 99291 CRITICAL CARE FIRST HOUR: CPT | Mod: GC

## 2024-03-27 PROCEDURE — 99233 SBSQ HOSP IP/OBS HIGH 50: CPT

## 2024-03-27 RX ORDER — SODIUM,POTASSIUM PHOSPHATES 278-250MG
2 POWDER IN PACKET (EA) ORAL EVERY 4 HOURS
Refills: 0 | Status: COMPLETED | OUTPATIENT
Start: 2024-03-27 | End: 2024-03-27

## 2024-03-27 RX ORDER — WARFARIN SODIUM 2.5 MG/1
12.5 TABLET ORAL ONCE
Refills: 0 | Status: COMPLETED | OUTPATIENT
Start: 2024-03-27 | End: 2024-03-27

## 2024-03-27 RX ORDER — WARFARIN SODIUM 2.5 MG/1
12.5 TABLET ORAL DAILY
Refills: 0 | Status: DISCONTINUED | OUTPATIENT
Start: 2024-03-27 | End: 2024-03-27

## 2024-03-27 RX ORDER — MAGNESIUM SULFATE 500 MG/ML
2 VIAL (ML) INJECTION ONCE
Refills: 0 | Status: COMPLETED | OUTPATIENT
Start: 2024-03-27 | End: 2024-03-27

## 2024-03-27 RX ADMIN — Medication 2 PACKET(S): at 10:52

## 2024-03-27 RX ADMIN — CHLORHEXIDINE GLUCONATE 1 APPLICATION(S): 213 SOLUTION TOPICAL at 13:20

## 2024-03-27 RX ADMIN — Medication 2 PACKET(S): at 14:18

## 2024-03-27 RX ADMIN — Medication 2 PACKET(S): at 05:59

## 2024-03-27 RX ADMIN — Medication 25 GRAM(S): at 05:59

## 2024-03-27 RX ADMIN — WARFARIN SODIUM 12.5 MILLIGRAM(S): 2.5 TABLET ORAL at 16:27

## 2024-03-27 NOTE — DISCHARGE NOTE PROVIDER - NSDCCPCAREPLAN_GEN_ALL_CORE_FT
Notified pt just an at home lab order was needed, OT and PT was an error. Pt has no further questions.    PRINCIPAL DISCHARGE DIAGNOSIS  Diagnosis: Hemoptysis  Assessment and Plan of Treatment: You were initially admitted because you had been coughing up significant amounts of blood and transferred to the MICU for acute surveillance and management. We held your anticoagulation in lieu of your active bleeding and you ultimately underwent a bronchoscopy to have the bleeding source identified and intervened on which appears to be a previously seen Left Lung mass. The mass had already been biopsied showing evidence of malignant cells. Were awaiting further pathology reports to return to dictate further treatment options.  Contact a health care provider if:  You have a fever over 100.4°F (38°C).  The amount of bleeding increases.  The blood looks brighter or darker red.  Get help right away if you:  Cough up fresh blood or blood clots.  Cough up 1–2 cups (240–480 mL) in 24 hours.  Have trouble breathing.  Feel like you are choking.  Have chest pain, light-headedness, or dizziness.      SECONDARY DISCHARGE DIAGNOSES  Diagnosis: Lung mass  Assessment and Plan of Treatment: You received a bronchoscopy by Dr. Green on 3/18, Samples were taken from the left lung lobe as well as associated lymph nodes and inital results came back for malignant cells in all regions. We are awaiting further results from the pathology team regarding the molecular nature of the cells in order to determine how to move forward with treatment. You will follow up with Dr. Adorno for your scheduled appointment at which time these results should be available to you.  Contact a health care provider if:  You have pain in your chest, back, or shoulder.  You are short of breath or have trouble breathing when you are active.  You develop a cough, or you develop hoarseness for an unexplained reason.  You feel sick or unusually tired.  You do not feel like eating, or you lose weight without trying.  You get chills or night sweats.  You need two or more pillows to sleep on at night.  You have any of these problems:  A fever and symptoms that suddenly get worse.  A fever or persistent symptoms for more than 2–3 days.  Get help right away if:  You cannot catch your breath.  You have sudden chest pain.  You start making high-pitched whistling sounds when you breathe, most often when you breathe out (you wheeze).  You cannot stop coughing, or you cough up blood or bloody mucus from your lungs (sputum).  You become dizzy or feel like you may faint.    Diagnosis: Lung cancer metastatic to bone  Assessment and Plan of Treatment: You had a CT done on 3/24 which showed some bone changes concerning for possible metastasis which we would like you to follow up with your PCP for possible re-imaging to evaluate for any evolution of the process.    Diagnosis: Pulmonary embolism  Assessment and Plan of Treatment: On imaging performed here you were also found to have a small pulmonary embolism which was deemed low risk and were already receiving anticoagulation to ensure it did not expand or worsen.

## 2024-03-27 NOTE — PROGRESS NOTE ADULT - REASON FOR ADMISSION
Hemoptysis iso PE

## 2024-03-27 NOTE — DISCHARGE NOTE PROVIDER - NSDCCPTREATMENT_GEN_ALL_CORE_FT
PRINCIPAL PROCEDURE  Procedure: CT scan  Findings and Treatment: IMPRESSION:  Tiny filling defect in the subsegmental branch of the left lower lobe   pulmonary artery (273:302), compatible with pulmonary embolism.   Suboptimal visualization of the distal subsegmental branch leading to   mass, query encased by mass or thrombosed. No central pulmonary embolism.  Prominent main pulmonary artery. Recommend clinical correlation to assess   for pulmonary arterial hypertension.  Left lower lobe bronchus appears obliterated containing soft tissue   density leading to the left lower lobe mass.  Poorly defined left lower lobe consolidative mass encasing adjacent   vessels and left lower lobe bronchus, concerning for neoplasm or   metastatic disease. Endobronchial extension of malignancy considered.   Correlate with findings of recently performed bronchoscopy.  Postoperative changes of aortic and mitral valvuloplasty. Fluid or   hematoma at the level of proximal ascending thoracic aorta with post   stenotic dilatation.< from: CT Angio Chest PE Protocol w/ IV Cont (03.24.24 @ 04:50) >        SECONDARY PROCEDURE  Procedure: Bronchoscopy with lung biopsy, initial  Findings and Treatment: 1. LUNG, LEFT LOWER LOBE, ROBOTIC ASSISTED BIOPSY AND FNA   POSITIVE FOR MALIGNANT CELLS.   Malignant neoplasm, pending further classification   Cytology slidesand cell block show singly-lying malignant cells with   prominent macronucleoli, enlarged eccentric nuclei, binucleation,   pleomorphism, and dense granular cytoplasm. Benign ciliated bronchial   cells and alveolar macrophages seen in background. Touchpreps and core   biopsy show minute lung parenchyma.   2. LYMPH NODE, LEVEL 11, LEFT, EBUS-GUIDED FNA   POSITIVE FOR MALIGNANT CELLS.   Malignant neoplasm, pending further classification

## 2024-03-27 NOTE — PROGRESS NOTE ADULT - ASSESSMENT
43 year old woman with rheumatic heart disease s/p mechanical AVR and MVR in 1996 that required re-op in 2018, (with revision in 2022 for dehiscence) on Warfarin, atrial flutter s/p CTI ablation 7/2023 who presents with hemoptysis following recent bronchoscopic biopsy of new lung mass. Cardiology consulted for AC recs. Due to mechanical valves and incidental likely PE found on CT Chest, patient needs anticoagulation, which should only be held in the presence of a life threatening bleed. Discussed risks of AC with patient including worsening airway bleed and need for emergent intubation, which patient understands. Now s/p bronchoscopy.     Recommendations:  - Reinitiate coumadin as soon as able, per IP recommendations, with goal INR close to 2.5-3 given bleeding history  - Monitor for recurrent bleeding when on therapeutic anticoagulation  - Appreciate excellent care per MICU/Interventional Pulmonology teams    Ozzy Anaya MD  Cardiology Fellow  All Cardiology service information can be found 24/7 on [email or web address], password: [email or web address]     ***Note not finalized until co-signed by attending***

## 2024-03-27 NOTE — PROGRESS NOTE ADULT - SUBJECTIVE AND OBJECTIVE BOX
Patient Information:  MARIA VICTORIA RDZ / 43y / Female / MRN#:[id number]    Hospital Day: 3d    Interval History:  Patient seen and examined at bedside. No acute events overnight; very minimal bloody output    Past Medical History:  Rheumatic Disease of Heart Valve    Endocarditis    Splenomegaly      Past Surgical History:  Aortic Valve Replaced    Mitral Valve Stenosis and Aortic Valve Insufficiency     Section     Section    H/O mitral valve replacement      Allergies:  No Known Allergies    Medications:  PRN:  ondansetron    Tablet 4 milliGRAM(s) Oral every 6 hours PRN Nausea and/or Vomiting    Standing:  chlorhexidine 2% Cloths 1 Application(s) Topical daily  potassium phosphate / sodium phosphate Powder (PHOS-NaK) 2 Packet(s) Oral every 4 hours    Home:  Coumadin 1 mg oral tablet: 12.5 milligram(s) orally once a day    Vitals:  T(C): 36.1, Max: 37.4 (24 @ 20:18)  T(F): 97, Max: 99.4 (24 @ 20:18)  HR: 80 (73 - 94)  BP: 106/55 (95/40 - 124/47)  RR: 20 (15 - 26)  SpO2: 98% (90% - 100%)    Physical Exam:  General: Awake, Alert. Not in acute distress.  Head: Normocephalic atraumatic.  Neck: Supple  Heart: Regular rate and rhythm; S1, S2; No murmurs.  Lungs: Clear to auscultation bilaterally.  Abdomen: Soft, nontender, nondistended.  Extremities: No edema in upper or lower extremities.  Neuro: AAOx3, No focal deficits.    Labs:  CBC ( @ 00:12)                        Hgb: 9.3    WBC: 6.28  )-----------------( Plts: 186                              Hct: 28.8     Chem ( @ 00:12)  Na: 138  |     Cl: 104     |  BUN: 9   -----------------------------------------< Gluc: 150    K: 3.8   |    HCO3: 22  |  Cr: 0.46    Ca 9.0 ( @ 00:12)  Phos 2.7 ( @ 00:12)  Mg 1.90 ( @ 00:12)    LFTs ( @ 00:12)  TPro 7.3  /  Alb 4.5  TBili 1.0  /  DBili     AST 46  /  ALT 72  /  AlkPhos 66    PT/INR ( @ 00:12)  PT: 12.9 ; INR: 1.16   PTT:           Urinalysis Basic ( @ 00:12)  Color:   Appearance:   SG:   pH:   Gluc: 150  Ketone:   Bili:   Urobili:    Blood:   Protein:   Nitrite:    Leuk Esterase:   RBC:   WBC:    Sq Epi:   Non Sq Epi:   Bacteria:     Microbiology:    Radiology:  
Interventional Pulmonary Consult Follow up     Interval Events:    Minimal old blood overnight and this am. 2    REVIEW OF SYSTEMS:  [x] All other systems negative except per HPI   [ ] Unable to assess ROS because ________    OBJECTIVE:  ICU Vital Signs Last 24 Hrs  T(C): 36.6 (26 Mar 2024 04:00), Max: 36.8 (25 Mar 2024 20:00)  T(F): 97.8 (26 Mar 2024 04:00), Max: 98.2 (25 Mar 2024 20:00)  HR: 78 (26 Mar 2024 10:00) (72 - 93)  BP: 120/65 (26 Mar 2024 10:00) (95/54 - 128/49)  BP(mean): 77 (26 Mar 2024 10:00) (57 - 84)  ABP: --  ABP(mean): --  RR: 19 (26 Mar 2024 10:00) (15 - 24)  SpO2: 99% (26 Mar 2024 10:00) (96% - 100%)    O2 Parameters below as of 26 Mar 2024 10:00  Patient On (Oxygen Delivery Method): room air              03-25 @ 07:01 - 03-26 @ 07:00  --------------------------------------------------------  IN: 287 mL / OUT: 500 mL / NET: -213 mL    03-26 @ 07:01 - 03-26 @ 11:00  --------------------------------------------------------  IN: 83 mL / OUT: 800 mL / NET: -717 mL        PHYSICAL EXAM:  GENERAL: NAD, well-groomed, well-developed  HEAD:  Atraumatic, Normocephalic  EYES: EOMI, conjunctiva and sclera clear  ENMT: Moist mucous membranes  CHEST/LUNG: Clear to auscultation bilaterally   HEART: Regular rate and rhythm; No murmurs, rubs, or gallops  ABDOMEN: Nondistended  VASCULAR: No  cyanosis, or edema  SKIN: No rashes or lesions  NERVOUS SYSTEM:  Alert & Oriented X3, Good concentration    HOSPITAL MEDICATIONS:  MEDICATIONS  (STANDING):  chlorhexidine 2% Cloths 1 Application(s) Topical daily    MEDICATIONS  (PRN):      LABS:  03-26    138  |  103  |  10  ----------------------------<  98  4.2   |  22  |  0.44<L>  03-25    140  |  103  |  10  ----------------------------<  99  4.0   |  25  |  0.49<L>  03-25    137  |  101  |  6<L>  ----------------------------<  106<H>  3.8   |  24  |  0.38<L>    Ca    9.0      26 Mar 2024 03:02  Ca    9.4      25 Mar 2024 21:30  Ca    8.9      25 Mar 2024 05:45  Phos  3.5     03-26  Mg     1.70     03-26    TPro  6.7  /  Alb  4.0  /  TBili  0.7  /  DBili  x   /  AST  52<H>  /  ALT  75<H>  /  AlkPhos  59  03-26  TPro  7.4  /  Alb  4.5  /  TBili  0.8  /  DBili  x   /  AST  58<H>  /  ALT  89<H>  /  AlkPhos  66  03-25  TPro  7.1  /  Alb  4.1  /  TBili  0.8  /  DBili  x   /  AST  62<H>  /  ALT  95<H>  /  AlkPhos  60  03-25    Magnesium: 1.70 mg/dL (03-26-24 @ 03:02)  Magnesium: 1.90 mg/dL (03-25-24 @ 21:30)  Magnesium: 1.70 mg/dL (03-25-24 @ 05:45)    Phosphorus: 3.5 mg/dL (03-26-24 @ 03:02)  Phosphorus: 3.5 mg/dL (03-25-24 @ 21:30)  Phosphorus: 3.6 mg/dL (03-25-24 @ 05:45)      PT/INR - ( 26 Mar 2024 03:02 )   PT: 15.1 sec;   INR: 1.36 ratio         PTT - ( 26 Mar 2024 03:02 )  PTT:51.8 sec              Urinalysis Basic - ( 26 Mar 2024 03:02 )    Color: x / Appearance: x / SG: x / pH: x  Gluc: 98 mg/dL / Ketone: x  / Bili: x / Urobili: x   Blood: x / Protein: x / Nitrite: x   Leuk Esterase: x / RBC: x / WBC x   Sq Epi: x / Non Sq Epi: x / Bacteria: x                              8.9    4.65  )-----------( 180      ( 26 Mar 2024 03:02 )             27.4                         9.8    5.18  )-----------( 210      ( 25 Mar 2024 21:30 )             30.3                         9.3    5.86  )-----------( 176      ( 25 Mar 2024 05:45 )             28.0     CAPILLARY BLOOD GLUCOSE        
Patient seen and examined at bedside.    Overnight Events:   Bronch yesterday, AC is held x24 hours, planning to reinitiate coumadin afterwards. Now with only slight hemoptysis.  Patient seen and examined at bedside.  No chest pain, shortness of breath, or palpitations     REVIEW OF SYSTEMS:  Constitutional:     [ ] negative [ ] fevers [ ] chills [ ] weight loss [ ] weight gain  HEENT:                  [ ] negative [ ] dry eyes [ ] eye irritation [ ] postnasal drip [ ] nasal congestion  CV:                         [ ] negative  [ ] chest pain [ ] orthopnea [ ] palpitations [ ] murmur  Resp:                     [ ] negative [ ] cough [ ] shortness of breath [ ] dyspnea [ ] wheezing [ ] sputum [ ]hemoptysis  GI:                          [ ] negative [ ] nausea [ ] vomiting [ ] diarrhea [ ] constipation [ ] abd pain [ ] dysphagia   :                        [ ] negative [ ] dysuria [ ] nocturia [ ] hematuria [ ] increased urinary frequency  Musculoskeletal: [ ] negative [ ] back pain [ ] myalgias [ ] arthralgias [ ] fracture  Skin:                       [ ] negative [ ] rash [ ] itch  Neurological:        [ ] negative [ ] headache [ ] dizziness [ ] syncope [ ] weakness [ ] numbness  Psychiatric:           [ ] negative [ ] anxiety [ ] depression  Endocrine:            [ ] negative [ ] diabetes [ ] thyroid problem  Heme/Lymph:      [ ] negative [ ] anemia [ ] bleeding problem  Allergic/Immune: [ ] negative [ ] itchy eyes [ ] nasal discharge [ ] hives [ ] angioedema    [ ] All other systems negative  [ ] Unable to assess ROS due to    Current Meds:  chlorhexidine 2% Cloths 1 Application(s) Topical daily  ondansetron    Tablet 4 milliGRAM(s) Oral every 6 hours PRN      PAST MEDICAL & SURGICAL HISTORY:  Rheumatic Disease of Heart Valve  Age 16      Endocarditis      Splenomegaly      Aortic Valve Replaced      Mitral Valve Stenosis and Aortic Valve Insufficiency       Section  @ 41wks secondary to fetal distress  8lbs, 9oz  Complicated by post-operative hemorrhage requiring transfusion       Section  @ 35wks secondary to formation of hematoma while on anticoagulation  6lbs, 7oz  Complicated by post-operative infection      H/O mitral valve replacement    Vitals:  T(F): 97 (-), Max: 99.4 (-)  HR: 80 () (73 - 94)  BP: 95/78 () (95/40 - 124/47)  RR: 18 ()  SpO2: 100% ()  I&O's Summary    26 Mar 2024 07:  -  27 Mar 2024 07:00  --------------------------------------------------------  IN: 233 mL / OUT: 1515 mL / NET: -1282 mL    27 Mar 2024 07:01  -  27 Mar 2024 14:20  --------------------------------------------------------  IN: 0 mL / OUT: 800 mL / NET: -800 mL        Physical Exam:  GENERAL: No acute distress, well-developed  HEAD:  Atraumatic, Normocephalic  ENT: EOMI, PERRLA, conjunctiva and sclera clear, Neck supple, moist mucosa  CHEST/LUNG: Clear to auscultation bilaterally; No wheeze, equal breath sounds bilaterally   HEART: Regular rate and rhythm; 3/6 holosystolic murmur, mechanical clicks at LUSB and apex  ABDOMEN: Soft, Nontender, Nondistended; Bowel sounds present  EXTREMITIES:  No clubbing, cyanosis, or edema  PSYCH: Nl behavior, nl affect  NEUROLOGY: AAOx3, non-focal, cranial nerves intact  SKIN: Normal color, No rashes or lesions                          9.3    6.28  )-----------( 186      ( 27 Mar 2024 00:12 )             28.8         138  |  104  |  9   ----------------------------<  150<H>  3.8   |  22  |  0.46<L>    Ca    9.0      27 Mar 2024 00:12  Phos  2.7       Mg     1.90         TPro  7.3  /  Alb  4.5  /  TBili  1.0  /  DBili  x   /  AST  46<H>  /  ALT  72<H>  /  AlkPhos  66  03-27    PT/INR - ( 27 Mar 2024 00:12 )   PT: 12.9 sec;   INR: 1.16 ratio         PTT - ( 26 Mar 2024 12:35 )  PTT:30.6 sec    New ECG(s): Personally reviewed    Echo:    Stress Testing:     Cath:    Imaging:    Interpretation of Telemetry:  
Patient seen and examined at bedside.    Overnight Events:   This AM, had another episode of hemoptysis, larger than her previous. Improved after inhaled TXA. Had SOB during, but not otherwise.   Patient seen and examined at bedside.  No chest pain, shortness of breath, or palpitations    REVIEW OF SYSTEMS:  Constitutional:     [ ] negative [ ] fevers [ ] chills [ ] weight loss [ ] weight gain  HEENT:                  [ ] negative [ ] dry eyes [ ] eye irritation [ ] postnasal drip [ ] nasal congestion  CV:                         [ ] negative  [ ] chest pain [ ] orthopnea [ ] palpitations [ ] murmur  Resp:                     [ ] negative [ ] cough [ ] shortness of breath [ ] dyspnea [ ] wheezing [ ] sputum [ ]hemoptysis  GI:                          [ ] negative [ ] nausea [ ] vomiting [ ] diarrhea [ ] constipation [ ] abd pain [ ] dysphagia   :                        [ ] negative [ ] dysuria [ ] nocturia [ ] hematuria [ ] increased urinary frequency  Musculoskeletal: [ ] negative [ ] back pain [ ] myalgias [ ] arthralgias [ ] fracture  Skin:                       [ ] negative [ ] rash [ ] itch  Neurological:        [ ] negative [ ] headache [ ] dizziness [ ] syncope [ ] weakness [ ] numbness  Psychiatric:           [ ] negative [ ] anxiety [ ] depression  Endocrine:            [ ] negative [ ] diabetes [ ] thyroid problem  Heme/Lymph:      [ ] negative [ ] anemia [ ] bleeding problem  Allergic/Immune: [ ] negative [ ] itchy eyes [ ] nasal discharge [ ] hives [ ] angioedema    [ ] All other systems negative  [ ] Unable to assess ROS due to    Current Meds:  cefTRIAXone   IVPB 1000 milliGRAM(s) IV Intermittent every 24 hours  heparin  Infusion. 1200 Unit(s)/Hr IV Continuous <Continuous>      PAST MEDICAL & SURGICAL HISTORY:  Rheumatic Disease of Heart Valve  Age 16      Endocarditis      Splenomegaly      Aortic Valve Replaced      Mitral Valve Stenosis and Aortic Valve Insufficiency       Section  @ 41wks secondary to fetal distress  8lbs, 9oz  Complicated by post-operative hemorrhage requiring transfusion       Section  @ 35wks secondary to formation of hematoma while on anticoagulation  6lbs, 7oz  Complicated by post-operative infection      H/O mitral valve replacement          Vitals:  T(F): 97.4 (03-25), Max: 98.6 (03-24)  HR: 83 (-) (76 - 88)  BP: 97/56 (-) (95/54 - 132/70)  RR: 16 ()  SpO2: 97% ()  I&O's Summary    24 Mar 2024 07:  -  25 Mar 2024 07:00  --------------------------------------------------------  IN: 189.3 mL / OUT: 850 mL / NET: -660.7 mL    25 Mar 2024 07:01  -  25 Mar 2024 13:08  --------------------------------------------------------  IN: 60 mL / OUT: 200 mL / NET: -140 mL      Physical Exam:  GENERAL: No acute distress, well-developed  HEAD:  Atraumatic, Normocephalic  ENT: EOMI, PERRLA, conjunctiva and sclera clear, Neck supple, moist mucosa  CHEST/LUNG: Clear to auscultation bilaterally; No wheeze, equal breath sounds bilaterally   HEART: Regular rate and rhythm; 3/6 holosystolic murmur, mechanical clicks at LUSB and apex  ABDOMEN: Soft, Nontender, Nondistended; Bowel sounds present  EXTREMITIES:  No clubbing, cyanosis, or edema  PSYCH: Nl behavior, nl affect  NEUROLOGY: AAOx3, non-focal, cranial nerves intact  SKIN: Normal color, No rashes or lesions                          9.3    5.86  )-----------( 176      ( 25 Mar 2024 05:45 )             28.0         137  |  101  |  6<L>  ----------------------------<  106<H>  3.8   |  24  |  0.38<L>    Ca    8.9      25 Mar 2024 05:45  Phos  3.6       Mg     1.70         TPro  7.1  /  Alb  4.1  /  TBili  0.8  /  DBili  x   /  AST  62<H>  /  ALT  95<H>  /  AlkPhos  60      PT/INR - ( 25 Mar 2024 05:45 )   PT: 17.4 sec;   INR: 1.56 ratio         PTT - ( 25 Mar 2024 05:45 )  PTT:48.1 sec      New ECG(s): Personally reviewed    Echo:  < from: TTE W or WO Ultrasound Enhancing Agent (24 @ 13:48) >   1. The left ventricular cavity is normal in size. Left ventricular wall thickness is normal. Left ventricular systolic function is hyperdynamic with an ejection fraction visually estimated at 70 to 75%. There are no regional wall motion abnormalities seen. The left ventricular diastolic function is indeterminate. Analysis of left ventricular diastolic function and filling pressure is made challenging by the presence of prostheticmitral valve.   2. Normal right ventricular cavity size and probably normal systolic function.   3. Mechanical valve is present in the mitral position. Trace intravalvular mitral regurgitation. The transmitral peak gradient is 11.6 mmHg and mean gradient is 6.00 mmHg. There is trace mitral regurgitation.   4. A mechanical valve is present in the aortic position. There is trace intravalvular regurgitation. The peak transaortic velocity is 3.69 m/s, peak transaortic gradient is 54.5 mmHg and mean transaortic gradient is 30.0 mmHg with an LVOT/aortic valve VTI ratio of 0.40.   5. No prior echocardiogram is available for comparison.    < end of copied text >      Stress Testing:     Cath:    Imaging:    Interpretation of Telemetry:  SR up to sinus tach 130s. 
Patient Information:  MARIA VICTORIA RDZ / 43y / Female / MRN#:[id number]    Hospital Day: 2d    Interval History:  Patient seen and examined at bedside. Pt experienced another episode of hemoptysis ovn approx 20ccs. HDS    Past Medical History:  Rheumatic Disease of Heart Valve    Endocarditis    Splenomegaly      Past Surgical History:  Aortic Valve Replaced    Mitral Valve Stenosis and Aortic Valve Insufficiency     Section     Section    H/O mitral valve replacement      Allergies:  No Known Allergies    Medications:  ----------------------------------------------  DRUGS ORDERED     ----------------------------------------------  DRUGS ADMINISTERED      -----------------------------------------------    PRN:    Standing:  chlorhexidine 2% Cloths 1 Application(s) Topical daily    Home:  Coumadin 1 mg oral tablet: 12.5 milligram(s) orally once a day    Vitals:  T(C): 36.6, Max: 36.8 (24 @ 20:00)  T(F): 97.8, Max: 98.2 (24 @ 20:00)  HR: 80 (72 - 93)  BP: 108/60 (95/54 - 128/49)  RR: 20 (15 - 24)  SpO2: 95% (95% - 100%)    Physical Exam:  General: Awake, Alert. Not in acute distress.  Head: Normocephalic atraumatic.  Neck: Supple  Heart: Regular rate and rhythm; S1, S2; No murmurs.  Lungs: Clear to auscultation bilaterally.  Abdomen: Soft, nontender, nondistended.  Extremities: No edema in upper or lower extremities.  Neuro: AAOx3, No focal deficits.    Labs:  CBC ( @ 03:02)                        Hgb: 8.9    WBC: 4.65  )-----------------( Plts: 180                              Hct: 27.4     Chem ( @ 03:02)  Na: 138  |     Cl: 103     |  BUN: 10  -----------------------------------------< Gluc: 98    K: 4.2   |    HCO3: 22  |  Cr: 0.44    Ca 9.0 ( 03:02)  Phos 3.5 ( 03:02)  Mg 1.70 ( @ 03:02)    LFTs ( 03:02)  TPro 6.7  /  Alb 4.0  TBili 0.7  /  DBili     AST 52  /  ALT 75  /  AlkPhos 59        PT/INR ( 03:02)  PT: 15.1 ; INR: 1.36   PTT: 51.8        Urinalysis Basic ( @ 03:02)  Color:   Appearance:   SG:   pH:   Gluc: 98  Ketone:   Bili:   Urobili:    Blood:   Protein:   Nitrite:    Leuk Esterase:   RBC:   WBC:    Sq Epi:   Non Sq Epi:   Bacteria:     Microbiology:  Culture - Bronchial (collected  @ 15:30)  Source: .Bronchial left lower lobe  Gram Stain ( @ 07:28):    Rare polymorphonuclear leukocytes seen per low power field    No Squamous epithelial cells seen per low power field    No organisms seen per oil power field  Final Report ( @ 15:57):    No growth    Culture - Acid Fast - Bronchial w/Smear (collected  @ 15:30)  Source: .Bronchial left lower lobe  Preliminary Report ( @ 15:09):    Culture is being performed.    Culture - Fungal, Bronchial (collected  @ 15:30)  Source: .Bronchial left lower lobe  Preliminary Report ( @ 15:04):    Culture is being performed. Fungal cultures are held for 4 weeks.    Radiology:  
Patient seen and examined at bedside.    Overnight Events:   Overnight, had hemoptysis with clotted blood with another episode this AM. Otherwise no complaints and feels well.  Patient seen and examined at bedside.  No chest pain, shortness of breath, or palpitations     REVIEW OF SYSTEMS:  Constitutional:     [ ] negative [ ] fevers [ ] chills [ ] weight loss [ ] weight gain  HEENT:                  [ ] negative [ ] dry eyes [ ] eye irritation [ ] postnasal drip [ ] nasal congestion  CV:                         [ ] negative  [ ] chest pain [ ] orthopnea [ ] palpitations [ ] murmur  Resp:                     [ ] negative [ ] cough [ ] shortness of breath [ ] dyspnea [ ] wheezing [ ] sputum [ ]hemoptysis  GI:                          [ ] negative [ ] nausea [ ] vomiting [ ] diarrhea [ ] constipation [ ] abd pain [ ] dysphagia   :                        [ ] negative [ ] dysuria [ ] nocturia [ ] hematuria [ ] increased urinary frequency  Musculoskeletal: [ ] negative [ ] back pain [ ] myalgias [ ] arthralgias [ ] fracture  Skin:                       [ ] negative [ ] rash [ ] itch  Neurological:        [ ] negative [ ] headache [ ] dizziness [ ] syncope [ ] weakness [ ] numbness  Psychiatric:           [ ] negative [ ] anxiety [ ] depression  Endocrine:            [ ] negative [ ] diabetes [ ] thyroid problem  Heme/Lymph:      [ ] negative [ ] anemia [ ] bleeding problem  Allergic/Immune: [ ] negative [ ] itchy eyes [ ] nasal discharge [ ] hives [ ] angioedema    [ ] All other systems negative  [ ] Unable to assess ROS due to    Current Meds:  chlorhexidine 2% Cloths 1 Application(s) Topical daily      PAST MEDICAL & SURGICAL HISTORY:  Rheumatic Disease of Heart Valve  Age 16      Endocarditis      Splenomegaly      Aortic Valve Replaced      Mitral Valve Stenosis and Aortic Valve Insufficiency       Section  @ 41wks secondary to fetal distress  8lbs, 9oz  Complicated by post-operative hemorrhage requiring transfusion       Section  @ 35wks secondary to formation of hematoma while on anticoagulation  6lbs, 7oz  Complicated by post-operative infection      H/O mitral valve replacement          Vitals:  T(F): 97.9 (-), Max: 98.4 ()  HR: 84 () (72 - 93)  BP: 119/66 () (95/54 - 128/49)  RR: 15 (-)  SpO2: 95% ()  I&O's Summary    25 Mar 2024 07:01  -  26 Mar 2024 07:00  --------------------------------------------------------  IN: 287 mL / OUT: 500 mL / NET: -213 mL    26 Mar 2024 07:01  -  26 Mar 2024 12:20  --------------------------------------------------------  IN: 83 mL / OUT: 800 mL / NET: -717 mL        Physical Exam:  GENERAL: No acute distress, well-developed  HEAD:  Atraumatic, Normocephalic  ENT: EOMI, PERRLA, conjunctiva and sclera clear, Neck supple, moist mucosa  CHEST/LUNG: Clear to auscultation bilaterally; No wheeze, equal breath sounds bilaterally   HEART: Regular rate and rhythm; 3/6 holosystolic murmur, mechanical clicks at LUSB and apex  ABDOMEN: Soft, Nontender, Nondistended; Bowel sounds present  EXTREMITIES:  No clubbing, cyanosis, or edema  PSYCH: Nl behavior, nl affect  NEUROLOGY: AAOx3, non-focal, cranial nerves intact  SKIN: Normal color, No rashes or lesions                          8.9    4.65  )-----------( 180      ( 26 Mar 2024 03:02 )             27.4         138  |  103  |  10  ----------------------------<  98  4.2   |  22  |  0.44<L>    Ca    9.0      26 Mar 2024 03:02  Phos  3.5       Mg     1.70         TPro  6.7  /  Alb  4.0  /  TBili  0.7  /  DBili  x   /  AST  52<H>  /  ALT  75<H>  /  AlkPhos  59      PT/INR - ( 26 Mar 2024 03:02 )   PT: 15.1 sec;   INR: 1.36 ratio         PTT - ( 26 Mar 2024 03:02 )  PTT:51.8 sec      New ECG(s): Personally reviewed    Echo:    Stress Testing:     Cath:    Imaging:    Interpretation of Telemetry:  
Pulmonary Consult Follow up     Interval Events:    Still has some hemoptysis.    REVIEW OF SYSTEMS:  [x] All other systems negative except per HPI   [ ] Unable to assess ROS because ________    OBJECTIVE:  ICU Vital Signs Last 24 Hrs  T(C): 36.6 (26 Mar 2024 04:00), Max: 36.8 (25 Mar 2024 20:00)  T(F): 97.8 (26 Mar 2024 04:00), Max: 98.2 (25 Mar 2024 20:00)  HR: 78 (26 Mar 2024 10:00) (72 - 93)  BP: 120/65 (26 Mar 2024 10:00) (95/54 - 128/49)  BP(mean): 77 (26 Mar 2024 10:00) (57 - 84)  ABP: --  ABP(mean): --  RR: 19 (26 Mar 2024 10:00) (15 - 24)  SpO2: 99% (26 Mar 2024 10:00) (96% - 100%)    O2 Parameters below as of 26 Mar 2024 10:00  Patient On (Oxygen Delivery Method): room air              03-25 @ 07:01 - 03-26 @ 07:00  --------------------------------------------------------  IN: 287 mL / OUT: 500 mL / NET: -213 mL    03-26 @ 07:01 - 03-26 @ 11:00  --------------------------------------------------------  IN: 83 mL / OUT: 800 mL / NET: -717 mL        PHYSICAL EXAM:  GENERAL: NAD, well-groomed, well-developed  HEAD:  Atraumatic, Normocephalic  EYES: EOMI, conjunctiva and sclera clear  ENMT: Moist mucous membranes  CHEST/LUNG: Clear to auscultation bilaterally   HEART: Regular rate and rhythm; No murmurs, rubs, or gallops  ABDOMEN: Nondistended  VASCULAR: No  cyanosis, or edema  SKIN: No rashes or lesions  NERVOUS SYSTEM:  Alert & Oriented X3, Good concentration    HOSPITAL MEDICATIONS:  MEDICATIONS  (STANDING):  chlorhexidine 2% Cloths 1 Application(s) Topical daily    MEDICATIONS  (PRN):      LABS:  03-26    138  |  103  |  10  ----------------------------<  98  4.2   |  22  |  0.44<L>  03-25    140  |  103  |  10  ----------------------------<  99  4.0   |  25  |  0.49<L>  03-25    137  |  101  |  6<L>  ----------------------------<  106<H>  3.8   |  24  |  0.38<L>    Ca    9.0      26 Mar 2024 03:02  Ca    9.4      25 Mar 2024 21:30  Ca    8.9      25 Mar 2024 05:45  Phos  3.5     03-26  Mg     1.70     03-26    TPro  6.7  /  Alb  4.0  /  TBili  0.7  /  DBili  x   /  AST  52<H>  /  ALT  75<H>  /  AlkPhos  59  03-26  TPro  7.4  /  Alb  4.5  /  TBili  0.8  /  DBili  x   /  AST  58<H>  /  ALT  89<H>  /  AlkPhos  66  03-25  TPro  7.1  /  Alb  4.1  /  TBili  0.8  /  DBili  x   /  AST  62<H>  /  ALT  95<H>  /  AlkPhos  60  03-25    Magnesium: 1.70 mg/dL (03-26-24 @ 03:02)  Magnesium: 1.90 mg/dL (03-25-24 @ 21:30)  Magnesium: 1.70 mg/dL (03-25-24 @ 05:45)    Phosphorus: 3.5 mg/dL (03-26-24 @ 03:02)  Phosphorus: 3.5 mg/dL (03-25-24 @ 21:30)  Phosphorus: 3.6 mg/dL (03-25-24 @ 05:45)      PT/INR - ( 26 Mar 2024 03:02 )   PT: 15.1 sec;   INR: 1.36 ratio         PTT - ( 26 Mar 2024 03:02 )  PTT:51.8 sec              Urinalysis Basic - ( 26 Mar 2024 03:02 )    Color: x / Appearance: x / SG: x / pH: x  Gluc: 98 mg/dL / Ketone: x  / Bili: x / Urobili: x   Blood: x / Protein: x / Nitrite: x   Leuk Esterase: x / RBC: x / WBC x   Sq Epi: x / Non Sq Epi: x / Bacteria: x                              8.9    4.65  )-----------( 180      ( 26 Mar 2024 03:02 )             27.4                         9.8    5.18  )-----------( 210      ( 25 Mar 2024 21:30 )             30.3                         9.3    5.86  )-----------( 176      ( 25 Mar 2024 05:45 )             28.0     CAPILLARY BLOOD GLUCOSE        
Patient Information:  MARIA VICTORIA RDZ / 43y / Female / MRN#:[id number]    Hospital Day: 1d    Interval History:  Patient seen and examined at bedside. Pt with 50ccs of hemoptysis ovn; heparin d/c'd for 2 hours. Stable this morning    Past Medical History:  Rheumatic Disease of Heart Valve    Endocarditis    Splenomegaly      Past Surgical History:  Aortic Valve Replaced    Mitral Valve Stenosis and Aortic Valve Insufficiency     Section     Section    H/O mitral valve replacement      Allergies:  No Known Allergies    Medications:  ----------------------------------------------  DRUGS ORDERED     ----------------------------------------------  DRUGS ADMINISTERED      -----------------------------------------------    PRN:    Standing:  [id number]   IVPB 1000 milliGRAM(s) IV Intermittent every 24 hours  heparin  Infusion. 1200 Unit(s)/Hr (12 mL/Hr) IV Continuous <Continuous>    Home:  Coumadin 1 mg oral tablet: 12.5 milligram(s) orally once a day    Vitals:  T(C): 36.3, Max: 37 (24 @ 20:00)  T(F): 97.4, Max: 98.6 (24 @ 20:00)  HR: 87 (76 - 88)  BP: 109/63 (95/54 - 132/70)  RR: 19 (12 - 20)  SpO2: 97% (96% - 100%)    Physical Exam:  General: Awake, Alert. Not in acute distress.  Head: Normocephalic atraumatic.  Neck: Supple  Heart: Regular rate and rhythm; S1, S2; No murmurs.  Lungs: Clear to auscultation bilaterally.  Abdomen: Soft, nontender, nondistended.  Extremities: No edema in upper or lower extremities.  Neuro: AAOx3, No focal deficits.    Labs:  CBC ( @ 05:45)                        Hgb: 9.3    WBC: 5.86  )-----------------( Plts: 176                              Hct: 28.0     Chem ( @ 05:45)  Na: 137  |     Cl: 101     |  BUN: 6   -----------------------------------------< Gluc: 106    K: 3.8   |    HCO3: 24  |  Cr: 0.38    Ca 8.9 ( @ 05:45)  Phos 3.6 ( 05:45)  Mg 1.70 ( 05:45)    LFTs ( 05:45)  TPro 7.1  /  Alb 4.1  TBili 0.8  /  DBili     AST 62  /  ALT 95  /  AlkPhos 60        PT/INR ( @ 12:25)  PT:      ; INR:        PTT: 86.5           Urinalysis Basic ( @ 05:45)  Color:   Appearance:   SG:   pH:   Gluc: 106  Ketone:   Bili:   Urobili:    Blood:   Protein:   Nitrite:    Leuk Esterase:   RBC:   WBC:    Sq Epi:   Non Sq Epi:   Bacteria:     Microbiology:  Culture - Acid Fast - Bronchial w/Smear (collected  @ 15:30)  Source: .Bronchial left lower lobe  Preliminary Report ( @ 15:09):    Culture is being performed.    Culture - Fungal, Bronchial (collected  @ 15:30)  Source: .Bronchial left lower lobe  Preliminary Report ( @ 15:04):    Culture is being performed. Fungal cultures are held for 4 weeks.    Culture - Bronchial (collected  @ 15:30)  Source: .Bronchial left lower lobe  Gram Stain ( @ 07:28):    Rare polymorphonuclear leukocytes seen per low power field    No Squamous epithelial cells seen per low power field    No organisms seen per oil power field  Final Report ( @ 15:57):    No growth    Radiology:  < from: CT Angio Chest PE Protocol w/ IV Cont (24 @ 04:50) >  IMPRESSION:    Tiny filling defect in the subsegmental branch of the left lower lobe   pulmonary artery (273:302), compatible with pulmonary embolism.   Suboptimal visualization of the distal subsegmental branch leading to   mass, query encased by mass or thrombosed. No central pulmonary embolism.    Prominent main pulmonary artery. Recommend clinical correlation to assess   for pulmonary arterial hypertension.    Left lower lobe bronchus appears obliterated containing soft tissue   density leading to the left lower lobe mass.    Poorly defined left lower lobe consolidative mass encasing adjacent   vessels and left lower lobe bronchus, concerning for neoplasm or   metastatic disease. Endobronchial extension of malignancy considered.   Correlate with findings of recently performed bronchoscopy.    Postoperative changes of aortic and mitral valvuloplasty. Fluid or   hematoma at the level of proximal ascending thoracic aorta with post   stenotic dilatation.    < end of copied text >

## 2024-03-27 NOTE — DISCHARGE NOTE PROVIDER - NSDCFUSCHEDAPPT_GEN_ALL_CORE_FT
Laron Green  [facility]  [address]  Scheduled Appointment: 04/15/2024

## 2024-03-27 NOTE — PROGRESS NOTE ADULT - ATTENDING COMMENTS
42yo woman with a medical history of rheumatic heart disease (s/p aortic-mitral valve replacements, pulm nodule s/p bronchoscopy (3/18), atrial flutter s/p CTI ablation 7/2023 who presented to the ED with hemoptysis and now admitted to the MICU with left sided segmental PE and now on heparin drip.     s/p bronch 3/26 with some bleeding noted from cancer    # acute hemoptysis  # L segmental PE  # mechanical valve  - discussed with IP and Dr. Jay Lisker (patient's outpatient cardiologist); plan was to continue to hold heparin gtt until tonight  - will decide on bridging versus just resuming coumadin tonight 42yo woman with a medical history of rheumatic heart disease (s/p aortic-mitral valve replacements, pulm nodule s/p bronchoscopy (3/18), atrial flutter s/p CTI ablation 7/2023 who presented to the ED with hemoptysis and now admitted to the MICU with left sided segmental PE and now on heparin drip.     s/p bronch 3/26 with some bleeding noted from cancer  no more hemoptysis this AM    # acute hemoptysis  # L segmental PE  # mechanical valve  - discussed with IP and Dr. Jay Lisker (patient's outpatient cardiologist); plan was to continue to hold heparin gtt   - discussed with Dr. Lisker and Dr. Garcia - will discharge on coumadin without lovenox and patient will f/u with cards tomorrow once home

## 2024-03-27 NOTE — PROGRESS NOTE ADULT - ASSESSMENT
44YO Female never smokerPMH rheumatic heart disease (s/p mechanical aortic and mitral valve on warfarin), AFlutter s/p CTI ablation 7/2023 and LLL spiculated nodule with hilar lymphadenopathy s/p bx of LLL nodule, 4R, 4L and 11L nodules who was admitted 3/24 for hemoptysis. Found to have CT chest notable for subsegemtnal  PE and consolidative mass encasing adjacent vessels and left lower lobe bronchus, concerning for neoplasm or metastatic disease. Interventional pulmonology consulted for hemoptysis.     #Hemoptysis  - Given need for AC in the setting of mechanical valves, had extensive discussion with pt and  regarding bronchoscopy to localize bleeding and potentially intervene. Explained risk of potentially life threatening bleeding when on anticoagulation and possible requirement for emergent intubation and bronchoscopy. She and her  acknowledged risk however opted for monitoring for bleeding while on anticoagulation  - Would start heparin gtt without bolus and monitor in MICU for further bleeding  - Hold warfarin for now and if no bleeding in 24hrs can restart bridge  - Please keep cup at bedside to monitor  - If pt requires emergent intubation would intubate with 8.0 tube and call interventional pulmonology  - Agree with starting ceftriaxone for now while waiting for strep swab to result  - F/u cytopath for primary diagnosis for malignancy (informed pt of preliminary findings)  - Plan for Bronchoscopy today.   - Case discussed with pt's cardiologist Dr. Jay Lisker - okay to hold anticoagulation for 24 hours in the setting of her hemoptysis. To be reevaluated tomorrow.     Case discussed with Dr. Green 42YO Female never smokerPMH rheumatic heart disease (s/p mechanical aortic and mitral valve on warfarin), AFlutter s/p CTI ablation 7/2023 and LLL spiculated nodule with hilar lymphadenopathy s/p bx of LLL nodule, 4R, 4L and 11L nodules who was admitted 3/24 for hemoptysis. Found to have CT chest notable for subsegemtnal  PE and consolidative mass encasing adjacent vessels and left lower lobe bronchus, concerning for neoplasm or metastatic disease. Interventional pulmonology consulted for hemoptysis.     #Hemoptysis  - Given need for AC in the setting of mechanical valves, had extensive discussion with pt and  regarding bronchoscopy to localize bleeding and potentially intervene. Explained risk of potentially life threatening bleeding when on anticoagulation and possible requirement for emergent intubation and bronchoscopy. She and her  acknowledged risk however opted for monitoring for bleeding while on anticoagulation  - AC held overnight, now much improved bleeding.  - Restarting anticoagulation per cardiology team. Patient to be started on coumadin and discharged on coumadin with outpatient follow up per cardiology team. D/w Dr. Lisker.   - Close IP Follow up.  - Will need to follow pathology results and complete staging and refer to oncology for disease directed therapy.     Case discussed with Dr. Huggins

## 2024-03-27 NOTE — PROGRESS NOTE ADULT - CRITICAL CARE ATTENDING COMMENT
Critically ill patient requiring frequent bedside visits with therapy changes.
Critically ill patient with mechanical mitral valve, hemoptysis, newly diagnosed cancer.
Critically ill patient requiring frequent bedside visits with therapy changes.
Critically ill patient requiring frequent bedside visits with therapy changes.

## 2024-03-27 NOTE — DISCHARGE NOTE PROVIDER - HOSPITAL COURSE
HPI:  Patient is a 44yo woman with a medical history of rheumatic heart disease (s/p aortic-mitral valve replacements), recently diagnosed pulm malignancy ( bronchoscopy 3/18), atrial flutter s/p CTI ablation 7/2023 who presented to the ED with hemoptysis and now admitted to the MICU with left sided segmental PE      Patient reported symptoms starting at night after her bronchoscopy and progressively getting worse the next 3 days. She was on a Lovenox bridge and warfarin but due to the bleeding she had held it for the past few days. Due to her symptoms not resolving she decided to call her pulmonologist who recommended coming in here. She denies any other symptoms like chest pain, shortness of breath, confusion, swelling in her lower extremities. She denies any potential sources of bleed.     Vitals in the ED were unremarkable for the patient.  Labs were noteworthy for HGH 9.6, coags elevated to 16.7/1.51/40.8, mild transaminase 109/72, CT angio was noted for left lower lobe filling defect, prominent pulm artery, left lower lobe consolidation.    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 PCP is Dr. Kinsey, her primary pulmonologist is Dr. Stanford, She follows Dr. Jay Lisker for cardiology. Her kids recently tested positive for strep throat and she has been on amoxicillin for prophylaxis.      Hospital Course:    Pt experienced bouts of emoptysis of 50cc overnight with heparin gtt held, inhaled txa initiated, onc consult once bleeding stabilized, If pt requires emergent intubation would intubate with 8.0 tube and call interventional pulmonology, initially refused bronchoscopy, patient now hedging, has not been therapeutic on ac, will discuss with IP, stop TXA, can consult Heme regarding von willebrand loss and bleeding diathesis, it is not unexpected bleeding but requires. attending will talk to ip, rapid strep order, repeat doppler  ON - heparin discontinued at 6am  3/26: coughed up 10cc blood, received txa overnight and hep gtt held, bronch at 5pm today and dc heparin close to procedure time, hold further abx after today, strep negative, ptt at 3pm,   cross coverage: bronch at 5pm, restart heparin when appropriate  3/27: still bloody output, stage 3b waiting for definite biopsy, pending chemoradiation vs. radiation; don't start heparin until seen by Norma, mass endobronchially with a lot of clots and mass was oozing friable and angry, cautery + txa done, plan for 24hrs off ac to let clot, restart coumadin tn, 12.5mg, would restart slightly lower dose, minerva will discuss with lisker whether to just discharge her w/ coumadin, no breidge    =====Cardiovascular=====  #Pulmonary Embolism   PE on CT scan in lieu of hemoptysis, cardiology following. No signs of R heart strain on CT, need to rule out with TTE. PESI score of 73: class two low risk.   - Start heparin drip w/out bolus  - TTE to definitely rule out right heart strain  - FU venous ultrasound lower extremity   - Appreciate cardiology/pulm and other consultant     #Mechanical Valves   Hx of severe mitral and moderate aortic regurgitation) with prior [facility] aortic and mitral valve replacements in 1996 w/ multiple revisions. Takes warfarin 12.5 daily iso of their atrial flutter.   - FU TTE and other cardio recs as above    =====Pulmonary=====  #PulmonaryMalignancy  Patient received bronchoscopy by Dr. Green on 3/18, Sample taken from lower left lobe and lymphadenopathy. Patient results came back for malignant cells in all region. CT 3/24 also noted some bone changes. potentially pointing towards metastatic disease.   - Onc consult after resolution of acute concerns  - FU pulm recs.   - Dr. Adorno consulted for possible bronch    =====GI=====  No present GI concerns, tolerating a regular kosher Diet     =====Renal/=====  No present Renal/ concerns     =====Infectious Disease=====  #StrepPyogenesProphylaxis  Patient's children tested positive for strep pyogens. Due to patient's history of rheumatic disease she was placed on prophylactic amoxicillin Recent labs came back for negative strep.   Plan  - CTM     =====Endocrine=====  No concerns presently.     - Heme consulted for input on new pulm malignancy and possible underlying bleeding disorder   given pt Hx of averse bleeding even prior to mech valve placement          Important Medication Changes and Reason:    Active or Pending Issues Requiring Follow-up:    Advanced Directives:   [ X] Full code  [ ] DNR  [ ] Hospice    Discharge Diagnoses:         HPI:  Patient is a 42yo woman with a medical history of rheumatic heart disease (s/p aortic-mitral valve replacements), recently diagnosed pulm malignancy ( bronchoscopy 3/18), atrial flutter s/p CTI ablation 7/2023 who presented to the ED with hemoptysis and now admitted to the MICU with left sided segmental PE      Patient reported symptoms starting at night after her bronchoscopy and progressively getting worse the next 3 days. She was on a Lovenox bridge and warfarin but due to the bleeding she had held it for the past few days. Due to her symptoms not resolving she decided to call her pulmonologist who recommended coming in here. She denies any other symptoms like chest pain, shortness of breath, confusion, swelling in her lower extremities. She denies any potential sources of bleed.     Vitals in the ED were unremarkable for the patient.  Labs were noteworthy for HGH 9.6, coags elevated to 16.7/1.51/40.8, mild transaminase 109/72, CT angio was noted for left lower lobe filling defect, prominent pulm artery, left lower lobe consolidation.    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 Her PCP is Dr. Kinsey, her primary pulmonologist is Dr. Stanford, She follows Dr. Jay Lisker for cardiology. Her kids recently tested positive for strep throat and she has been on amoxicillin for prophylaxis.      Hospital Course:  Pt experienced bouts of hemoptysis while here and had her heparin gtt intermittently held while receiving several doses of inhaled txa. Pt ultimately seen by Interventional Pulmonology for bronchoscopy which revealed persistent bleeding from friable mass which team attempted to cauterize/TXA as much as possible. Pt returned after procedure  and has since had significantly less hemoptysis. Pt discharged back on home Warfarin of 12.5 mg without bridging.  Will f/u with Dr. Adorno regarding follow up care and further biopsy results.     Important Medication Changes and Reason:    Active or Pending Issues Requiring Follow-up:    Discharge Diagnoses:  Lung Mass  Hemoptysis       HPI:  Patient is a 44yo woman with a medical history of rheumatic heart disease (s/p aortic-mitral valve replacements), recently diagnosed pulm malignancy ( bronchoscopy 3/18), atrial flutter s/p CTI ablation 7/2023 who presented to the ED with hemoptysis and now admitted to the MICU with left sided segmental PE      Patient reported symptoms starting at night after her bronchoscopy and progressively getting worse the next 3 days. She was on a Lovenox bridge and warfarin but due to the bleeding she had held it for the past few days. Due to her symptoms not resolving she decided to call her pulmonologist who recommended coming in here. She denies any other symptoms like chest pain, shortness of breath, confusion, swelling in her lower extremities. She denies any potential sources of bleed.     Vitals in the ED were unremarkable for the patient.  Labs were noteworthy for HGH 9.6, coags elevated to 16.7/1.51/40.8, mild transaminase 109/72, CT angio was noted for left lower lobe filling defect, prominent pulm artery, left lower lobe consolidation.    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 Her PCP is Dr. Kinsey, her primary pulmonologist is Dr. Stanford, She follows Dr. Jay Lisker for cardiology. Her kids recently tested positive for strep throat and she has been on amoxicillin for prophylaxis.      Hospital Course:  Pt experienced bouts of hemoptysis while here and had her heparin gtt intermittently held while receiving several doses of inhaled txa. Pt ultimately seen by Interventional Pulmonology for bronchoscopy which revealed persistent bleeding from friable mass which team attempted to cauterize/TXA as much as possible. Pt returned after procedure  and has since had significantly less hemoptysis. Pt discharged back on home Warfarin of 12.5 mg without bridging.  Will f/u with Dr. Adorno regarding follow up care and further biopsy results.     Important Medication Changes and Reason:    Active or Pending Issues Requiring Follow-up:  Appt Dr. Green 4/15; F/u earlier if sxs persists    Discharge Diagnoses:  Lung Mass  Hemoptysis       HPI:  Patient is a 44yo woman with a medical history of rheumatic heart disease (s/p aortic-mitral valve replacements), recently diagnosed pulm malignancy ( bronchoscopy 3/18), atrial flutter s/p CTI ablation 7/2023 who presented to the ED with hemoptysis and now admitted to the MICU with left sided segmental PE      Patient reported symptoms starting at night after her bronchoscopy and progressively getting worse the next 3 days. She was on a Lovenox bridge and warfarin but due to the bleeding she had held it for the past few days. Due to her symptoms not resolving she decided to call her pulmonologist who recommended coming in here. She denies any other symptoms like chest pain, shortness of breath, confusion, swelling in her lower extremities. She denies any potential sources of bleed.     Vitals in the ED were unremarkable for the patient.  Labs were noteworthy for HGH 9.6, coags elevated to 16.7/1.51/40.8, mild transaminase 109/72, CT angio was noted for left lower lobe filling defect, prominent pulm artery, left lower lobe consolidation.    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 Her PCP is Dr. Kinsey, her primary pulmonologist is Dr. Stanford, She follows Dr. Jay Lisker for cardiology. Her kids recently tested positive for strep throat and she has been on amoxicillin for prophylaxis.      Hospital Course:  Pt experienced bouts of hemoptysis while here and had her heparin gtt intermittently held while receiving several doses of inhaled txa. Pt ultimately seen by Interventional Pulmonology for bronchoscopy which revealed persistent bleeding from friable mass which team attempted to cauterize/TXA as much as possible. Pt returned after procedure  and has since had significantly less hemoptysis. Pt discharged back on home Warfarin of 12.5 mg without bridging.  Will f/u with Dr. Adorno regarding follow up care and further biopsy results.     Important Medication Changes and Reason:    Active or Pending Issues Requiring Follow-up:  Dr. Jay Lisker - cardiologist regarding coumadin and possible lovenox bridging for anti-coagulation/blood thinner	  Appt Dr. Green 4/15; F/u earlier if sxs persists    Discharge Diagnoses:  Lung Mass  Hemoptysis

## 2024-03-27 NOTE — PROGRESS NOTE ADULT - ASSESSMENT
Patient is a 44yo woman with a medical history of rheumatic heart disease (s/p aortic-mitral valve replacements, pulm nodule s/p bronschopy (3/18), atrial flutter s/p CTI ablation 7/2023 who presented to the ED with hemoptysis and now admitted to the MICU with left sided segmental PE and now on heparin drip.     =====Neurologic=====  Patient is AOx4  Mentating well with no concerns at the moment     =====Cardiovascular=====  #Pulmonary Embolism   PE on CT, cardiology following. No signs of R heart strain on CT, PESI score of 73: class two low risk.   - Start heparin drip w/out bolus  - no right heart strain on TTE  - Doppler  - Appreciate cardiology/pulm recs    #Mechanical Valves   Hx of severe mitral and moderate aortic regurgitation) with prior [facility] aortic and mitral valve replacements in 1996 w/ multiple revisions. Takes warfarin 12.5 daily iso of their atrial flutter.   - TTE 3/24  - On Heparin titrating down in lieu of hemoptysis  - Likely place back on Warfarin 12.5 later today    =====Pulmonary=====  #PulmonaryMalignancy  Patient received bronchoscopy by Dr. Green on 3/18, Sample taken from lower left lobe and lymphadenopathy. Patient results came back for malignant cells in all region. CT 3/24 also noted some bone changes; potentially pointing towards metastatic disease. Pt experiencing hemoptysis likely 2/2 lung lesion.   - Onc consult after resolution of acute concerns  - FU pulm/heme recs  - Bronch 3/26 - active bleeding; cauterized   - Dr. Adorno following      =====GI=====  No present GI concerns, tolerating a regular kosher Diet     =====Renal/=====  No present Renal/ concerns     =====Infectious Disease=====  #StrepPyogenesProphylaxis  Patient's children tested positive for strep pyogens. Due to patient's history of rheumatic disease she was placed on prophylactic amoxicillin. Recent labs came back for negative strep.   Plan  - CTM     =====Endocrine=====  No concerns presently.     =====Heme/Onc=====  - DVT PPX: Heparin   - Heme consulted for input on new pulm malignancy and possible underlying bleeding disorder   given pt Hx of averse bleeding even prior to mech valve placement  - must c/w ac given mech valves (holding for tentative bronchoscopy)  - Heme following  - On Hep full ac given mech valves; titrating down in lieu of hemoptysis     =====MICUGeneral=====  Lines:  Left wrist peripheral  Drips:  Heparin  IVF : NA  O2:  Satting well on room air  AB: NA  Diet: Regular Kosher     Pain: NA  DVT: holding patient's home warfarin until later today

## 2024-03-27 NOTE — DISCHARGE NOTE PROVIDER - CARE PROVIDER_API CALL
Laron Green  Pulmonary Disease  [address] [place] [id number]  Phone: [phone]  Fax: [phone]  Established Patient  Follow Up Time: 1 week

## 2024-03-27 NOTE — PROGRESS NOTE ADULT - ATTENDING COMMENTS
patient seen and examined  labs and vitals reviewed  agree with above assessment and plan  case discussed with micu, interventional pulm, primary cardiologist  plan to temporarily hold a/c, restart as soon as tomorrow, pending bleeding  cont monitoring per IP, primary team

## 2024-03-27 NOTE — DISCHARGE NOTE NURSING/CASE MANAGEMENT/SOCIAL WORK - PATIENT PORTAL LINK FT
You can access the FollowMyHealth Patient Portal offered by [facility] by registering at the following website: [email or web address]. By joining [employer]’s FollowMyHealth portal, you will also be able to view your health information using other applications (apps) compatible with our system.

## 2024-04-01 ENCOUNTER — APPOINTMENT (OUTPATIENT)
Dept: PULMONOLOGY | Facility: CLINIC | Age: 44
End: 2024-04-01
Payer: COMMERCIAL

## 2024-04-01 ENCOUNTER — NON-APPOINTMENT (OUTPATIENT)
Age: 44
End: 2024-04-01

## 2024-04-01 DIAGNOSIS — C34.92 MALIGNANT NEOPLASM OF UNSPECIFIED PART OF LEFT BRONCHUS OR LUNG: ICD-10-CM

## 2024-04-01 PROCEDURE — 99442: CPT

## 2024-04-02 ENCOUNTER — APPOINTMENT (OUTPATIENT)
Dept: PULMONOLOGY | Facility: CLINIC | Age: 44
End: 2024-04-02

## 2024-04-02 RX ORDER — AMOXICILLIN 500 MG/1
500 CAPSULE ORAL
Qty: 16 | Refills: 4 | Status: ACTIVE | COMMUNITY
Start: 2024-04-02 | End: 1900-01-01

## 2024-04-03 LAB
INR PPP: 1.6
PROTHROMBIN TIME COMMENT: NORMAL

## 2024-04-08 LAB
INR PPP: 2.8
PROTHROMBIN TIME COMMENT: NORMAL

## 2024-04-10 ENCOUNTER — NON-APPOINTMENT (OUTPATIENT)
Age: 44
End: 2024-04-10

## 2024-04-11 LAB
INR PPP: 2.4
PROTHROMBIN TIME COMMENT: NORMAL

## 2024-04-15 ENCOUNTER — APPOINTMENT (OUTPATIENT)
Dept: PULMONOLOGY | Facility: CLINIC | Age: 44
End: 2024-04-15

## 2024-04-17 LAB
CULTURE RESULTS: SIGNIFICANT CHANGE UP
SPECIMEN SOURCE: SIGNIFICANT CHANGE UP

## 2024-04-22 LAB
INR PPP: 3.2
PROTHROMBIN TIME COMMENT: NORMAL

## 2024-04-24 LAB
INR PPP: 2.8
PROTHROMBIN TIME COMMENT: NORMAL

## 2024-05-04 LAB
CULTURE RESULTS: SIGNIFICANT CHANGE UP
SPECIMEN SOURCE: SIGNIFICANT CHANGE UP

## 2024-05-09 LAB
INR PPP: 1.4
PROTHROMBIN TIME COMMENT: NORMAL

## 2024-05-21 LAB
INR PPP: 1.7
PROTHROMBIN TIME COMMENT: NORMAL

## 2024-06-07 LAB
INR PPP: 2.7
PROTHROMBIN TIME COMMENT: NORMAL

## 2024-06-11 LAB
INR PPP: 2.6
INR PPP: 4.3
PROTHROMBIN TIME COMMENT: NORMAL
PROTHROMBIN TIME COMMENT: NORMAL

## 2024-06-19 ENCOUNTER — NON-APPOINTMENT (OUTPATIENT)
Age: 44
End: 2024-06-19

## 2024-06-19 ENCOUNTER — APPOINTMENT (OUTPATIENT)
Dept: CARDIOLOGY | Facility: CLINIC | Age: 44
End: 2024-06-19
Payer: COMMERCIAL

## 2024-06-19 VITALS
HEART RATE: 70 BPM | DIASTOLIC BLOOD PRESSURE: 64 MMHG | OXYGEN SATURATION: 99 % | SYSTOLIC BLOOD PRESSURE: 110 MMHG | WEIGHT: 155 LBS | BODY MASS INDEX: 25.02 KG/M2

## 2024-06-19 DIAGNOSIS — Z01.810 ENCOUNTER FOR PREPROCEDURAL CARDIOVASCULAR EXAMINATION: ICD-10-CM

## 2024-06-19 PROCEDURE — G2211 COMPLEX E/M VISIT ADD ON: CPT

## 2024-06-19 PROCEDURE — 99214 OFFICE O/P EST MOD 30 MIN: CPT

## 2024-06-19 PROCEDURE — 93000 ELECTROCARDIOGRAM COMPLETE: CPT | Mod: NC

## 2024-06-19 NOTE — DISCUSSION/SUMMARY
[FreeTextEntry1] : Zoe French is a 44-year-old woman with prior rheumatic heart disease (status post mechanical AVR (#21 [place]) and MVR (#25/33 on-X) who has Non-small cell lung cancer and is scheduled to undergo left thoracotomy and left lower lobe lobectomy.  ECG shows atrial rhythm with voltage criteria for LVH.  Similar to prior. MVR/AVR: She has been maintained on Coumadin with a goal INR between 2.5 and 3.5. We discussed the need for Bridging her off of warfarin onto Lovenox prior to the planned biopsy. She will discontinue Coumadin on eweek before surgery and check her INR in 2 days, then daily thereafter.  Once her INR is below 2, she will begin Lovenox therapy of 1 mg/kg twice daily (70 mg every 12 hours). She will then miss her Lovenox dose 12 hours prior to the surgery. Postoperatively, once the active bleeding is stopped.  She should restart Coumadin alone without bridging with Lovenox.  Her INR will slowly become therapeutic over the next 3 to 4 days which should keep her bleeding risk low She is optimized from a cardiovascular standpoint to proceed with the planned surgery.  [EKG obtained to assist in diagnosis and management of assessed problem(s)] : EKG obtained to assist in diagnosis and management of assessed problem(s)

## 2024-06-19 NOTE — CARDIOLOGY SUMMARY
[de-identified] : Jdo XT from 8/3/2021-8/10/2021: min HR: 57, max HR: 164, mean HR: 78, no AF or AFl, 7 runs of non-sustained AT [de-identified] : March 24, 2024  CONCLUSIONS:  1. The left ventricular cavity is normal in size. Left ventricular wall thickness is normal. Left ventricular systolic function is hyperdynamic with an ejection fraction visually estimated at 70 to 75%. There are no regional wall motion abnormalities seen. The left ventricular diastolic function is indeterminate. Analysis of left ventricular diastolic function and filling pressure is made challenging by the presence of prosthetic mitral valve.  2. Normal right ventricular cavity size and probably normal systolic function.  3. Mechanical valve is present in the mitral position. Trace intravalvular mitral regurgitation. The transmitral peak gradient is 11.6 mmHg and mean gradient is 6.00 mmHg. There is trace mitral regurgitation.  4. A mechanical valve is present in the aortic position. There is trace intravalvular regurgitation. The peak transaortic velocity is 3.69 m/s, peak transaortic gradient is 54.5 mmHg and mean transaortic gradient is 30.0 mmHg with an LVOT/aortic valve VTI ratio of 0.40.  5. No prior echocardiogram is available for comparison.

## 2024-06-19 NOTE — PHYSICAL EXAM
[Well Developed] : well developed [Well Nourished] : well nourished [Normal Conjunctiva] : normal conjunctiva [Normal Venous Pressure] : normal venous pressure [Clear Lung Fields] : clear lung fields [Soft] : abdomen soft [Normal Gait] : normal gait [No Edema] : no edema [No Rash] : no rash [Moves all extremities] : moves all extremities [Alert and Oriented] : alert and oriented [de-identified] : Mid systolic and diastolic clicks are heard.  crisp. [de-identified] : 1+ radial pulse on the right.  The remaining pulses are 2+.

## 2024-06-19 NOTE — HISTORY OF PRESENT ILLNESS
[FreeTextEntry1] : Dear Dr. Ramirez, ([phone]) She was seen today for preoperative evaluation prior to her planned left thoracotomy and left lower lobe lobectomy.  This is scheduled for July 24, 2024. She was seen at [facility] [place] for evaluation of her lung mass and diagnosed with non-small cell lung CA. She is scheduled to undergo open thoracotomy and left lobectomy. She reports feeling okay overall and has been able to go about her usual activities since her bronchoscopy. Previous transitions to and from anticoagulation were complicated by significant bleeding/Hemoptysis after being bridged with Lovenox after surgery. She also recently noted to have left leg swelling and the workup is underway. She was recently started on alectinib therapy.      Prior: Zoe French returns today for preoperative cardiovascular evaluation prior to planned bronchoscopy with Laron Green MD on 3/19/2024.  As she returns today she is feeling well and she is seen in her usual state of health. Her most recent INR was 2.8 on Thursday of last week. She is planning to transition of enoxaparin in anticipation of upcoming procedure. Her last dose of warfarin will be this evening.  Been feeling well overall and is able to go about her usual activities including walking on a treadmill routinely without any chest pains or shortness of breath. Recent echocardiogram done December 2023 showed normal left ventricular systolic function.  Prior: She sought cardiovascular management since her previous cardiologist was not able to manage her home INRs. 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 She denies any chest pains or significant palpitations since her recent ablation, but does have occasional palpitations which is being assessed by a cardiac monitor currently. She reports no active bleeding issues but does note significant difficulty in wound healing time and bleeding discontinuation when she cuts herself in the kitchen. She has no orthopnea, PND or leg edema reported. Her most recent ECG showed normal sinus rhythm with LVH. She tends to avoid taking medications but has been consistent with her warfarin and test herself weekly with a goal INR of 2.5-3.5. She does not take aspirin therapy.  Prior echocardiogram done November 30, 2022 showed normal left ventricular systolic function with an estimated ejection fraction of 65 to 70%.She was in atrial flutter at the time so gradients are measured at a heart rate of 95 bpm.  Prior EP note was reviewed and is found below: Zoe French is a 43-year-old woman with Possible rheumatic heart disease (severe mitral and moderate aortic regurgitation) with prior [place] aortic and mitral valve replacements in 1996 by Dr. Nirmala Adorno, a second surgery by Dr. Gary Valdes on 4/18/2018 that involved a re-operation on the aortic and mitral valves, a tricuspid and aortic root repair and a left atrial roof "enlargement" using a bovine pericardial patch. In February of 2022 she had a third operation by Dr. Radha Mejia from [facility] involving an aortic root replacement and a mechanical aortic valve along with a mechanical mitral valve replacement and reconstruction of the aortomitral curtain. Her discharge summary from her last admission also documents "rapid atrial fibrillation." She was treated with amiodarone previously.  She presents today for an initial evaluation of an atrial flutter. The patient's atrial flutter was diagnosed following her third open heart operation in February of 2022. She brought a copy of a Zio XT monitor from January of 2023 that demonstrated a 100% burden of atrial flutter. Her heart rate ranged from 56 beats per minute to 151 beats per minute with an average heart rate of 88 beats per minute. She remained in atrial flutter at her office visit with Dr. Martel on June 2nd 2023. She reports that she has had palpitations since her most recent surgery and currently experiences palpitations on a daily basis. She describes her palpitations as a "rough" and fast heart beat. Her symptoms are worse with exertion. She also endorses shortness of breath and dizziness. For years she has been monitoring her INR at home. She reports that her INR has not been subtherapeutic recently.

## 2024-06-21 LAB
INR PPP: 4.3
PROTHROMBIN TIME COMMENT: NORMAL

## 2024-06-26 ENCOUNTER — APPOINTMENT (OUTPATIENT)
Dept: CARDIOLOGY | Facility: CLINIC | Age: 44
End: 2024-06-26

## 2024-07-12 LAB
INR PPP: 3.4
INR PPP: 4.7

## 2024-07-19 LAB
INR PPP: 2.3
PROTHROMBIN TIME COMMENT: NORMAL

## 2024-08-04 LAB
INR PPP: 1.4
PROTHROMBIN TIME COMMENT: NORMAL

## 2024-08-16 LAB
INR PPP: 3
PROTHROMBIN TIME COMMENT: NORMAL

## 2024-08-26 LAB
INR PPP: 2.2
PROTHROMBIN TIME COMMENT: NORMAL

## 2024-08-27 LAB
INR PPP: 2.6
PROTHROMBIN TIME COMMENT: NORMAL

## 2024-09-10 ENCOUNTER — APPOINTMENT (OUTPATIENT)
Dept: CARDIOLOGY | Facility: CLINIC | Age: 44
End: 2024-09-10
Payer: COMMERCIAL

## 2024-09-10 ENCOUNTER — NON-APPOINTMENT (OUTPATIENT)
Age: 44
End: 2024-09-10

## 2024-09-10 VITALS
BODY MASS INDEX: 24.21 KG/M2 | OXYGEN SATURATION: 99 % | DIASTOLIC BLOOD PRESSURE: 70 MMHG | HEART RATE: 83 BPM | WEIGHT: 150 LBS | SYSTOLIC BLOOD PRESSURE: 112 MMHG

## 2024-09-10 DIAGNOSIS — Z95.2 PRESENCE OF PROSTHETIC HEART VALVE: ICD-10-CM

## 2024-09-10 DIAGNOSIS — I48.0 PAROXYSMAL ATRIAL FIBRILLATION: ICD-10-CM

## 2024-09-10 DIAGNOSIS — C34.92 MALIGNANT NEOPLASM OF UNSPECIFIED PART OF LEFT BRONCHUS OR LUNG: ICD-10-CM

## 2024-09-10 PROCEDURE — 93000 ELECTROCARDIOGRAM COMPLETE: CPT

## 2024-09-10 PROCEDURE — 99215 OFFICE O/P EST HI 40 MIN: CPT | Mod: 25

## 2024-09-10 NOTE — DISCUSSION/SUMMARY
[FreeTextEntry1] : Zoe French is a 44-year-old woman with prior rheumatic heart disease (status post mechanical AVR (#21 [place]) and MVR (#25/33 on-X) who has Non-small cell lung cancer who underwent left thoracotomy and left lower lobe lobectomy.  ECG shows atrial rhythm with voltage criteria for LVH.  Similar to prior. MVR/AVR: She has been maintained on Coumadin with a goal INR between 2.5 and 3.5.   Dyspnea: not cardiac. Left pleural effusion on exam. To see pulmonary soson. To see Dr. Trinh in January for echo. See me Mary. [EKG obtained to assist in diagnosis and management of assessed problem(s)] : EKG obtained to assist in diagnosis and management of assessed problem(s)

## 2024-09-10 NOTE — PHYSICAL EXAM
[Well Developed] : well developed [Well Nourished] : well nourished [Normal Conjunctiva] : normal conjunctiva [Normal Venous Pressure] : normal venous pressure [Clear Lung Fields] : clear lung fields [Soft] : abdomen soft [Normal Gait] : normal gait [No Edema] : no edema [No Rash] : no rash [Moves all extremities] : moves all extremities [Alert and Oriented] : alert and oriented [de-identified] : Mid systolic and diastolic clicks are heard.  crisp. [de-identified] : 1+ radial pulse on the right.  The remaining pulses are 2+.

## 2024-09-10 NOTE — HISTORY OF PRESENT ILLNESS
[FreeTextEntry1] : s/p left lower lobectotomy. readmitted for reaccumulating of pleural effusion. Chest tube placed and after a week she had it removed. Echo done in the hospital was unremarkable aside from the HR and valve issues. SOLIZ has not improved much since the lobectomy.  uncomfortable chest sensation after large meals.  Seeing pulmonologist, Dr. Emery Horn.  Prior: Dear Dr. Ramirez, ([phone]) She was seen today for preoperative evaluation prior to her planned left thoracotomy and left lower lobe lobectomy.  This is scheduled for July 24, 2024. She was seen at [facility] for evaluation of her lung mass and diagnosed with non-small cell lung CA. She is scheduled to undergo open thoracotomy and left lobectomy. She reports feeling okay overall and has been able to go about her usual activities since her bronchoscopy. Previous transitions to and from anticoagulation were complicated by significant bleeding/Hemoptysis after being bridged with Lovenox after surgery. She also recently noted to have left leg swelling and the workup is underway. She was recently started on alectinib therapy.      Prior: Zoe French returns today for preoperative cardiovascular evaluation prior to planned bronchoscopy with Laron Green MD on 3/19/2024.  As she returns today she is feeling well and she is seen in her usual state of health. Her most recent INR was 2.8 on Thursday of last week. She is planning to transition of enoxaparin in anticipation of upcoming procedure. Her last dose of warfarin will be this evening.  Been feeling well overall and is able to go about her usual activities including walking on a treadmill routinely without any chest pains or shortness of breath. Recent echocardiogram done December 2023 showed normal left ventricular systolic function.  Prior: She sought cardiovascular management since her previous cardiologist was not able to manage her home INRs. 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 She denies any chest pains or significant palpitations since her recent ablation, but does have occasional palpitations which is being assessed by a cardiac monitor currently. She reports no active bleeding issues but does note significant difficulty in wound healing time and bleeding discontinuation when she cuts herself in the kitchen. She has no orthopnea, PND or leg edema reported. Her most recent ECG showed normal sinus rhythm with LVH. She tends to avoid taking medications but has been consistent with her warfarin and test herself weekly with a goal INR of 2.5-3.5. She does not take aspirin therapy.  Prior echocardiogram done November 30, 2022 showed normal left ventricular systolic function with an estimated ejection fraction of 65 to 70%.She was in atrial flutter at the time so gradients are measured at a heart rate of 95 bpm.  Prior EP note was reviewed and is found below: Zoe French is a 43-year-old woman with Possible rheumatic heart disease (severe mitral and moderate aortic regurgitation) with prior [place] aortic and mitral valve replacements in 1996 by Dr. Nirmala Adorno, a second surgery by Dr. Gary Valdes on 4/18/2018 that involved a re-operation on the aortic and mitral valves, a tricuspid and aortic root repair and a left atrial roof "enlargement" using a bovine pericardial patch. In February of 2022 she had a third operation by Dr. Radha Mejia from [facility] involving an aortic root replacement and a mechanical aortic valve along with a mechanical mitral valve replacement and reconstruction of the aortomitral curtain. Her discharge summary from her last admission also documents "rapid atrial fibrillation." She was treated with amiodarone previously.  She presents today for an initial evaluation of an atrial flutter. The patient's atrial flutter was diagnosed following her third open heart operation in February of 2022. She brought a copy of a Zio XT monitor from January of 2023 that demonstrated a 100% burden of atrial flutter. Her heart rate ranged from 56 beats per minute to 151 beats per minute with an average heart rate of 88 beats per minute. She remained in atrial flutter at her office visit with Dr. Martel on June 2nd 2023. She reports that she has had palpitations since her most recent surgery and currently experiences palpitations on a daily basis. She describes her palpitations as a "rough" and fast heart beat. Her symptoms are worse with exertion. She also endorses shortness of breath and dizziness. For years she has been monitoring her INR at home. She reports that her INR has not been subtherapeutic recently.

## 2024-09-10 NOTE — CARDIOLOGY SUMMARY
[de-identified] : Jdo XT from 8/3/2021-8/10/2021: min HR: 57, max HR: 164, mean HR: 78, no AF or AFl, 7 runs of non-sustained AT [de-identified] : March 24, 2024  CONCLUSIONS:  1. The left ventricular cavity is normal in size. Left ventricular wall thickness is normal. Left ventricular systolic function is hyperdynamic with an ejection fraction visually estimated at 70 to 75%. There are no regional wall motion abnormalities seen. The left ventricular diastolic function is indeterminate. Analysis of left ventricular diastolic function and filling pressure is made challenging by the presence of prosthetic mitral valve.  2. Normal right ventricular cavity size and probably normal systolic function.  3. Mechanical valve is present in the mitral position. Trace intravalvular mitral regurgitation. The transmitral peak gradient is 11.6 mmHg and mean gradient is 6.00 mmHg. There is trace mitral regurgitation.  4. A mechanical valve is present in the aortic position. There is trace intravalvular regurgitation. The peak transaortic velocity is 3.69 m/s, peak transaortic gradient is 54.5 mmHg and mean transaortic gradient is 30.0 mmHg with an LVOT/aortic valve VTI ratio of 0.40.  5. No prior echocardiogram is available for comparison.

## 2024-09-18 LAB
INR PPP: 2.7
PROTHROMBIN TIME COMMENT: NORMAL

## 2024-09-26 LAB
INR PPP: 2.4
PROTHROMBIN TIME COMMENT: NORMAL

## 2024-10-03 LAB
INR PPP: 2.9
PROTHROMBIN TIME COMMENT: NORMAL

## 2024-10-09 LAB
INR PPP: 1.9
PROTHROMBIN TIME COMMENT: NORMAL

## 2024-10-18 ENCOUNTER — NON-APPOINTMENT (OUTPATIENT)
Age: 44
End: 2024-10-18

## 2024-10-23 LAB
INR PPP: 1.9
PROTHROMBIN TIME COMMENT: NORMAL

## 2024-10-28 LAB
INR PPP: 2.5
PROTHROMBIN TIME COMMENT: NORMAL

## 2024-11-07 LAB
INR PPP: 2.6
PROTHROMBIN TIME COMMENT: NORMAL

## 2024-11-17 LAB
INR PPP: 2.5
INR PPP: 2.7
PROTHROMBIN TIME COMMENT: NORMAL
PROTHROMBIN TIME COMMENT: NORMAL

## 2024-11-25 LAB
INR PPP: 2.5
PROTHROMBIN TIME COMMENT: NORMAL

## 2024-11-25 RX ORDER — WARFARIN 2.5 MG/1
2.5 TABLET ORAL DAILY
Qty: 60 | Refills: 2 | Status: ACTIVE | COMMUNITY
Start: 2024-11-25 | End: 1900-01-01

## 2024-12-05 LAB
INR PPP: 3.5
PROTHROMBIN TIME COMMENT: NORMAL

## 2024-12-18 ENCOUNTER — APPOINTMENT (OUTPATIENT)
Dept: PEDIATRIC CARDIOLOGY | Facility: CLINIC | Age: 44
End: 2024-12-18

## 2024-12-23 LAB
INR PPP: 2
INR PPP: 3
PROTHROMBIN TIME COMMENT: NORMAL
PROTHROMBIN TIME COMMENT: NORMAL

## 2024-12-27 LAB
INR PPP: 3
PROTHROMBIN TIME COMMENT: NORMAL

## 2025-01-12 LAB
INR PPP: 2.7
PROTHROMBIN TIME COMMENT: NORMAL

## 2025-01-15 ENCOUNTER — APPOINTMENT (OUTPATIENT)
Dept: PEDIATRIC CARDIOLOGY | Facility: CLINIC | Age: 45
End: 2025-01-15
Payer: COMMERCIAL

## 2025-01-15 VITALS
DIASTOLIC BLOOD PRESSURE: 79 MMHG | SYSTOLIC BLOOD PRESSURE: 124 MMHG | HEART RATE: 73 BPM | BODY MASS INDEX: 26.35 KG/M2 | WEIGHT: 154.32 LBS | OXYGEN SATURATION: 100 % | HEIGHT: 64.17 IN

## 2025-01-15 DIAGNOSIS — I48.0 PAROXYSMAL ATRIAL FIBRILLATION: ICD-10-CM

## 2025-01-15 DIAGNOSIS — I09.9 RHEUMATIC HEART DISEASE, UNSPECIFIED: ICD-10-CM

## 2025-01-15 PROCEDURE — G2211 COMPLEX E/M VISIT ADD ON: CPT | Mod: NC

## 2025-01-15 PROCEDURE — 93306 TTE W/DOPPLER COMPLETE: CPT

## 2025-01-15 PROCEDURE — 99214 OFFICE O/P EST MOD 30 MIN: CPT

## 2025-01-28 LAB
INR PPP: 3.5
PROTHROMBIN TIME COMMENT: NORMAL

## 2025-02-03 LAB
INR PPP: 3.7
PROTHROMBIN TIME COMMENT: NORMAL

## 2025-02-10 LAB
INR PPP: 2.5
PROTHROMBIN TIME COMMENT: NORMAL

## 2025-02-18 LAB
INR PPP: 2.9
PROTHROMBIN TIME COMMENT: NORMAL

## 2025-02-24 LAB
INR PPP: 3.3
PROTHROMBIN TIME COMMENT: NORMAL

## 2025-03-05 LAB
INR PPP: 2.5
PROTHROMBIN TIME COMMENT: NORMAL

## 2025-03-13 LAB
INR PPP: 3
PROTHROMBIN TIME COMMENT: NORMAL

## 2025-03-24 LAB
INR PPP: 1.9
PROTHROMBIN TIME COMMENT: NORMAL

## 2025-03-31 LAB
INR PPP: 2.7
PROTHROMBIN TIME COMMENT: NORMAL

## 2025-04-07 LAB
INR PPP: 2.6
PROTHROMBIN TIME COMMENT: NORMAL

## 2025-04-11 LAB
INR PPP: 2.6
PROTHROMBIN TIME COMMENT: NORMAL

## 2025-04-21 LAB
INR PPP: 2.5
PROTHROMBIN TIME COMMENT: NORMAL

## 2025-05-06 LAB
INR PPP: 2.5
PROTHROMBIN TIME COMMENT: NORMAL

## 2025-05-09 LAB
INR PPP: 2.5
PROTHROMBIN TIME COMMENT: NORMAL

## 2025-05-13 DIAGNOSIS — Z13.83 ENCOUNTER FOR SCREENING FOR RESPIRATORY DISORDER NEC: ICD-10-CM

## 2025-06-03 LAB
INR PPP: 2.5
PROTHROMBIN TIME COMMENT: NORMAL

## 2025-06-09 LAB
INR PPP: 2.6
INR PPP: 2.8
INR PPP: 3.1
PROTHROMBIN TIME COMMENT: NORMAL

## 2025-06-16 LAB
INR PPP: 2.5
PROTHROMBIN TIME COMMENT: NORMAL

## 2025-06-27 LAB
INR PPP: 2.6
PROTHROMBIN TIME COMMENT: NORMAL

## 2025-07-01 LAB
INR PPP: 3.1
PROTHROMBIN TIME COMMENT: NORMAL

## 2025-07-20 LAB
INR PPP: 2.1
INR PPP: 2.2
PROTHROMBIN TIME COMMENT: NORMAL
PROTHROMBIN TIME COMMENT: NORMAL

## 2025-07-22 LAB
INR PPP: 4.4
PROTHROMBIN TIME COMMENT: NORMAL

## 2025-07-23 ENCOUNTER — APPOINTMENT (OUTPATIENT)
Dept: PULMONOLOGY | Facility: CLINIC | Age: 45
End: 2025-07-23
Payer: COMMERCIAL

## 2025-07-23 VITALS
HEART RATE: 77 BPM | SYSTOLIC BLOOD PRESSURE: 104 MMHG | BODY MASS INDEX: 24.83 KG/M2 | WEIGHT: 149 LBS | TEMPERATURE: 98.4 F | HEIGHT: 65 IN | OXYGEN SATURATION: 97 % | DIASTOLIC BLOOD PRESSURE: 64 MMHG

## 2025-07-23 VITALS
DIASTOLIC BLOOD PRESSURE: 64 MMHG | BODY MASS INDEX: 24.83 KG/M2 | OXYGEN SATURATION: 99 % | SYSTOLIC BLOOD PRESSURE: 99 MMHG | HEIGHT: 65 IN | WEIGHT: 149 LBS | HEART RATE: 75 BPM

## 2025-07-23 DIAGNOSIS — R06.81 APNEA, NOT ELSEWHERE CLASSIFIED: ICD-10-CM

## 2025-07-23 DIAGNOSIS — J98.4 CHRONIC OBSTRUCTIVE PULMONARY DISEASE, UNSPECIFIED: ICD-10-CM

## 2025-07-23 DIAGNOSIS — R53.83 OTHER FATIGUE: ICD-10-CM

## 2025-07-23 DIAGNOSIS — R06.09 OTHER FORMS OF DYSPNEA: ICD-10-CM

## 2025-07-23 DIAGNOSIS — J44.9 CHRONIC OBSTRUCTIVE PULMONARY DISEASE, UNSPECIFIED: ICD-10-CM

## 2025-07-23 DIAGNOSIS — R40.0 SOMNOLENCE: ICD-10-CM

## 2025-07-23 PROCEDURE — 99205 OFFICE O/P NEW HI 60 MIN: CPT | Mod: 25

## 2025-07-23 PROCEDURE — 94060 EVALUATION OF WHEEZING: CPT

## 2025-07-23 PROCEDURE — 94729 DIFFUSING CAPACITY: CPT

## 2025-07-23 PROCEDURE — ZZZZZ: CPT

## 2025-07-23 PROCEDURE — 94726 PLETHYSMOGRAPHY LUNG VOLUMES: CPT

## 2025-07-28 LAB
INR PPP: 2.7
PROTHROMBIN TIME COMMENT: NORMAL

## 2025-08-04 LAB
INR PPP: 2.7
PROTHROMBIN TIME COMMENT: NORMAL

## 2025-08-08 ENCOUNTER — APPOINTMENT (OUTPATIENT)
Dept: CARDIOLOGY | Facility: CLINIC | Age: 45
End: 2025-08-08

## 2025-08-15 LAB
INR PPP: 2.7
PROTHROMBIN TIME COMMENT: NORMAL

## 2025-08-18 ENCOUNTER — NON-APPOINTMENT (OUTPATIENT)
Age: 45
End: 2025-08-18

## 2025-08-19 LAB
INR PPP: 2.7
PROTHROMBIN TIME COMMENT: NORMAL

## 2025-08-20 ENCOUNTER — APPOINTMENT (OUTPATIENT)
Dept: CARDIOLOGY | Facility: CLINIC | Age: 45
End: 2025-08-20
Payer: COMMERCIAL

## 2025-08-20 VITALS
SYSTOLIC BLOOD PRESSURE: 100 MMHG | HEART RATE: 72 BPM | BODY MASS INDEX: 25.46 KG/M2 | OXYGEN SATURATION: 100 % | WEIGHT: 153 LBS | DIASTOLIC BLOOD PRESSURE: 60 MMHG

## 2025-08-20 DIAGNOSIS — Z01.810 ENCOUNTER FOR PREPROCEDURAL CARDIOVASCULAR EXAMINATION: ICD-10-CM

## 2025-08-20 DIAGNOSIS — R07.89 OTHER CHEST PAIN: ICD-10-CM

## 2025-08-20 PROCEDURE — 99215 OFFICE O/P EST HI 40 MIN: CPT | Mod: 25

## 2025-08-20 PROCEDURE — 93000 ELECTROCARDIOGRAM COMPLETE: CPT | Mod: NC

## 2025-08-22 ENCOUNTER — RX RENEWAL (OUTPATIENT)
Age: 45
End: 2025-08-22

## 2025-09-02 LAB
INR PPP: 2.6
PROTHROMBIN TIME COMMENT: NORMAL

## 2025-09-04 ENCOUNTER — APPOINTMENT (OUTPATIENT)
Dept: CARDIOLOGY | Facility: CLINIC | Age: 45
End: 2025-09-04
Payer: COMMERCIAL

## 2025-09-04 PROCEDURE — 93351 STRESS TTE COMPLETE: CPT

## 2025-09-08 LAB
INR PPP: 2.6
PROTHROMBIN TIME COMMENT: NORMAL

## 2025-09-15 LAB
INR PPP: 2.6
PROTHROMBIN TIME COMMENT: NORMAL

## (undated) DEVICE — SYR LUER SLIP TIP 30CC

## (undated) DEVICE — DRAPE 3/4 SHEET 52X76"

## (undated) DEVICE — PROBE FIAPC DIA 2.3MM/7FR LNTH 220CM/7.2FT

## (undated) DEVICE — SYR SLIP 10CC

## (undated) DEVICE — FORCEP BIOPSY 1.8MM JAW X 100CM DISP

## (undated) DEVICE — PACK BRONCHOSCOPY

## (undated) DEVICE — DRAPE TOWEL BLUE 17" X 24"

## (undated) DEVICE — FORCEP BIOPSY BRONCHOSCOPE DISP

## (undated) DEVICE — MASK SURGICAL WITH EYESHIELD ANTIFOG (ORANGE)

## (undated) DEVICE — LABELS BLANK W PEN

## (undated) DEVICE — SYR LUER LOK 10CC

## (undated) DEVICE — BALLOON SINGLE FOR BF-UC160F

## (undated) DEVICE — GLV 7 PROTEXIS (WHITE)

## (undated) DEVICE — BRUSH CYTO DISP

## (undated) DEVICE — DRSG TAPE TRANSPORE 1"

## (undated) DEVICE — VALVE BIOPSY BRONCHOVIDEOSCOPE

## (undated) DEVICE — SOL IRR POUR H2O 500ML

## (undated) DEVICE — STOPCOCK 4-WAY (BLUE) DISCOFIX SPIN-LOCK CONNECTOR

## (undated) DEVICE — TUBING CANNULA SALTER LABS NASAL ADULT 7FT

## (undated) DEVICE — WARMING BLANKET LOWER ADULT

## (undated) DEVICE — VENODYNE/SCD SLEEVE CALF MEDIUM

## (undated) DEVICE — BRONCHOSCOPE GALAXY CONN IRR ASPIR TUBE SCP GUIDE

## (undated) DEVICE — GLV 8.5 PROTEXIS (WHITE)

## (undated) DEVICE — VALVE SUCTION EVIS 160/200/240

## (undated) DEVICE — SOL INJ NS 0.9% 100ML

## (undated) DEVICE — SYR LUER LOK 20CC

## (undated) DEVICE — ADAPTER FIBEROPTIC BRONCHOSCOPE DUAL AXIS SWIVEL

## (undated) DEVICE — SOL ANTI FOG

## (undated) DEVICE — NDL ASPIRATION VIZISHOT2 22G

## (undated) DEVICE — SOL IRR NS 0.9% 250ML

## (undated) DEVICE — SUTURE REMOVAL KIT

## (undated) DEVICE — PLATE NESSY 170

## (undated) DEVICE — GLV 8 PROTEXIS (WHITE)

## (undated) DEVICE — DRSG CURITY GAUZE SPONGE 4 X 4" 12-PLY

## (undated) DEVICE — TRAP SPECIMEN SPUTUM 40CC

## (undated) DEVICE — BIOPSY FORCEP OLYMPUS ALLIGATOR 2.0MM

## (undated) DEVICE — NDL ARCHPOINT PULMONARY 21G

## (undated) DEVICE — BIOPSY FORCEP J&J MONARCH SMOOTH CUP

## (undated) DEVICE — SOL IRR POUR NS 0.9% 500ML